# Patient Record
Sex: FEMALE | Race: WHITE | Employment: OTHER | URBAN - METROPOLITAN AREA
[De-identification: names, ages, dates, MRNs, and addresses within clinical notes are randomized per-mention and may not be internally consistent; named-entity substitution may affect disease eponyms.]

---

## 2018-01-01 ENCOUNTER — TELEPHONE (OUTPATIENT)
Dept: FAMILY MEDICINE CLINIC | Age: 68
End: 2018-01-01

## 2018-01-01 ENCOUNTER — OFFICE VISIT (OUTPATIENT)
Dept: FAMILY MEDICINE CLINIC | Age: 68
End: 2018-01-01
Payer: MEDICARE

## 2018-01-01 ENCOUNTER — HOSPITAL ENCOUNTER (OUTPATIENT)
Age: 68
Discharge: HOME | End: 2018-08-16
Payer: MEDICARE

## 2018-01-01 VITALS
HEIGHT: 60 IN | TEMPERATURE: 97.9 F | WEIGHT: 121 LBS | RESPIRATION RATE: 16 BRPM | SYSTOLIC BLOOD PRESSURE: 128 MMHG | DIASTOLIC BLOOD PRESSURE: 88 MMHG | HEART RATE: 86 BPM | BODY MASS INDEX: 23.75 KG/M2 | OXYGEN SATURATION: 93 %

## 2018-01-01 VITALS
BODY MASS INDEX: 24.15 KG/M2 | TEMPERATURE: 97.8 F | OXYGEN SATURATION: 96 % | SYSTOLIC BLOOD PRESSURE: 120 MMHG | RESPIRATION RATE: 16 BRPM | HEART RATE: 75 BPM | DIASTOLIC BLOOD PRESSURE: 78 MMHG | HEIGHT: 60 IN | WEIGHT: 123 LBS

## 2018-01-01 DIAGNOSIS — G44.229 CHRONIC TENSION-TYPE HEADACHE, NOT INTRACTABLE: ICD-10-CM

## 2018-01-01 DIAGNOSIS — I10 ESSENTIAL HYPERTENSION: ICD-10-CM

## 2018-01-01 DIAGNOSIS — G44.229 CHRONIC TENSION-TYPE HEADACHE, NOT INTRACTABLE: Primary | ICD-10-CM

## 2018-01-01 DIAGNOSIS — Z51.81 THERAPEUTIC DRUG MONITORING: Primary | ICD-10-CM

## 2018-01-01 LAB
6AM URINE: <10 NG/ML
AMOBARBITAL URINE QUANT: <50 NG/ML
AMPHETAMINE SCREEN, URINE: NOT DETECTED
BARBITURATE SCREEN URINE: POSITIVE
BENZODIAZEPINE SCREEN, URINE: NOT DETECTED
BUTALBITAL URINE QUANT: 366 NG/ML
CANNABINOID SCREEN URINE: NOT DETECTED
COCAINE METABOLITE SCREEN URINE: NOT DETECTED
CODEINE, URINE: 46 NG/ML
HYDROCODONE, URINE: 37 NG/ML
HYDROMORPHONE, URINE: <20 NG/ML
METHADONE SCREEN, URINE: NOT DETECTED
MORPHINE URINE: <20 NG/ML
NORHYDROCODONE, URINE: 94 NG/ML
NOROXYCODONE, URINE: <20 NG/ML
NOROXYMORPHONE, URINE: <20 NG/ML
OPIATE SCREEN URINE: POSITIVE
OXYCODONE, URINE CONFIRMATION: <20 NG/ML
OXYMORPHONE, URINE: <20 NG/ML
PENTOBARBITAL URINE QUANT: <50 NG/ML
PHENCYCLIDINE SCREEN URINE: NOT DETECTED
PHENOBARBITAL URINE QUANT: <50 NG/ML
PROPOXYPHENE SCREEN: NOT DETECTED
SECOBARBITAL URINE QUANT: <50 NG/ML

## 2018-01-01 PROCEDURE — 99213 OFFICE O/P EST LOW 20 MIN: CPT | Performed by: FAMILY MEDICINE

## 2018-01-01 PROCEDURE — 80307 DRUG TEST PRSMV CHEM ANLYZR: CPT

## 2018-01-01 PROCEDURE — G0480 DRUG TEST DEF 1-7 CLASSES: HCPCS

## 2018-01-01 RX ORDER — BUTALBITAL, ASPIRIN, CAFFEINE, AND CODEINE PHOSPHATE 30; 50; 40; 325 MG/1; MG/1; MG/1; MG/1
1 CAPSULE ORAL EVERY 4 HOURS PRN
Qty: 120 CAPSULE | Refills: 0 | Status: CANCELLED | OUTPATIENT
Start: 2018-01-01 | End: 2019-01-01

## 2018-01-01 RX ORDER — BUTALBITAL, ASPIRIN, CAFFEINE, AND CODEINE PHOSPHATE 30; 50; 40; 325 MG/1; MG/1; MG/1; MG/1
1 CAPSULE ORAL 4 TIMES DAILY PRN
Qty: 120 CAPSULE | Refills: 0 | Status: SHIPPED | OUTPATIENT
Start: 2018-01-01 | End: 2018-01-01 | Stop reason: SDUPTHER

## 2018-01-01 RX ORDER — CODEINE/BUTALBITAL/ASA/CAFFEIN 30-50-325
1 CAPSULE ORAL EVERY 8 HOURS PRN
Qty: 90 CAPSULE | Refills: 0 | Status: SHIPPED | OUTPATIENT
Start: 2018-01-01 | End: 2019-01-01 | Stop reason: SDUPTHER

## 2018-01-01 RX ORDER — CODEINE/BUTALBITAL/ASA/CAFFEIN 30-50-325
1 CAPSULE ORAL EVERY 4 HOURS PRN
COMMUNITY
End: 2018-01-01 | Stop reason: SDUPTHER

## 2018-01-01 RX ORDER — ESTROGENS, CONJUGATED 1.25 MG
1.25 TABLET ORAL DAILY
Qty: 90 TABLET | Refills: 1 | Status: SHIPPED | OUTPATIENT
Start: 2018-01-01 | End: 2018-01-01

## 2018-01-01 RX ORDER — AMLODIPINE BESYLATE AND BENAZEPRIL HYDROCHLORIDE 5; 20 MG/1; MG/1
1 CAPSULE ORAL DAILY
Qty: 30 CAPSULE | Refills: 3 | Status: ON HOLD | OUTPATIENT
Start: 2018-01-01 | End: 2019-01-01 | Stop reason: HOSPADM

## 2018-01-01 RX ORDER — BUTALBITAL, ASPIRIN, CAFFEINE, AND CODEINE PHOSPHATE 30; 50; 40; 325 MG/1; MG/1; MG/1; MG/1
1 CAPSULE ORAL 4 TIMES DAILY PRN
Qty: 120 CAPSULE | Refills: 0 | Status: SHIPPED | OUTPATIENT
Start: 2018-01-01 | End: 2018-01-01

## 2018-01-01 ASSESSMENT — PATIENT HEALTH QUESTIONNAIRE - PHQ9
2. FEELING DOWN, DEPRESSED OR HOPELESS: 0
1. LITTLE INTEREST OR PLEASURE IN DOING THINGS: 0
SUM OF ALL RESPONSES TO PHQ QUESTIONS 1-9: 0
SUM OF ALL RESPONSES TO PHQ9 QUESTIONS 1 & 2: 0
SUM OF ALL RESPONSES TO PHQ QUESTIONS 1-9: 0

## 2018-01-01 ASSESSMENT — ENCOUNTER SYMPTOMS
HEMOPTYSIS: 0
NAUSEA: 0
SHORTNESS OF BREATH: 0
HEARTBURN: 0
COUGH: 0
BLURRED VISION: 0
COUGH: 1
WHEEZING: 0

## 2018-08-14 NOTE — PROGRESS NOTES
HPI:  Patient comes in today for   Chief Complaint   Patient presents with    Headache     refill on fiorinal last dose three days ago. .          Review of Systems  Review of Systems   Constitutional: Negative for chills, fever and weight loss. HENT: Negative for hearing loss and tinnitus. Eyes: Negative for blurred vision. Respiratory: Negative for cough and hemoptysis. Cardiovascular: Negative for chest pain and palpitations. Gastrointestinal: Negative for heartburn and nausea. Skin: Negative for rash. Neurological: Positive for headaches (Severe are cluster types). Negative for tingling and tremors. Psychiatric/Behavioral: Negative for depression. PE:  VS:  /88   Pulse 86   Temp 97.9 °F (36.6 °C) (Oral)   Resp 16   Ht 5' (1.524 m)   Wt 121 lb (54.9 kg)   SpO2 93%   BMI 23.63 kg/m²   Physical Exam   Constitutional: She appears well-developed. HENT:   Head: Normocephalic. Eyes: Pupils are equal, round, and reactive to light. Neck: Normal range of motion. No tracheal deviation present. No thyromegaly present. Cardiovascular: Normal rate. Pulmonary/Chest: Effort normal.   Abdominal: Soft. She exhibits no distension. There is no tenderness. Musculoskeletal: Normal range of motion. Neurological: She is alert. Skin: Skin is warm. No rash noted. No erythema. Assessment/Plan:  Rosa Jordan was seen today for headache. Diagnoses and all orders for this visit:    Therapeutic drug monitoring  -     Urine Drug Screen; Future    Chronic tension-type headache, not intractable  -     FIORINAL/CODEINE #3 -95-30 MG capsule; Take 1 capsule by mouth 4 times daily as needed for Migraine for up to 30 days. JOSEPH Ritchie.

## 2019-01-01 ENCOUNTER — HOSPITAL ENCOUNTER (INPATIENT)
Age: 69
LOS: 13 days | Discharge: HOME HEALTH | DRG: 870 | End: 2019-02-19
Attending: EMERGENCY MEDICINE | Admitting: INTERNAL MEDICINE
Payer: MEDICARE

## 2019-01-01 ENCOUNTER — OFFICE VISIT (OUTPATIENT)
Dept: FAMILY MEDICINE CLINIC | Age: 69
End: 2019-01-01
Payer: MEDICARE

## 2019-01-01 ENCOUNTER — CARE COORDINATION (OUTPATIENT)
Dept: CARE COORDINATION | Age: 69
End: 2019-01-01

## 2019-01-01 ENCOUNTER — TELEPHONE (OUTPATIENT)
Dept: FAMILY MEDICINE CLINIC | Age: 69
End: 2019-01-01

## 2019-01-01 ENCOUNTER — APPOINTMENT (OUTPATIENT)
Dept: GENERAL RADIOLOGY | Age: 69
DRG: 870 | End: 2019-01-01
Payer: MEDICARE

## 2019-01-01 VITALS
TEMPERATURE: 98.3 F | BODY MASS INDEX: 24.74 KG/M2 | OXYGEN SATURATION: 97 % | RESPIRATION RATE: 16 BRPM | HEIGHT: 60 IN | WEIGHT: 126 LBS | DIASTOLIC BLOOD PRESSURE: 80 MMHG | SYSTOLIC BLOOD PRESSURE: 136 MMHG | HEART RATE: 101 BPM

## 2019-01-01 VITALS
DIASTOLIC BLOOD PRESSURE: 87 MMHG | RESPIRATION RATE: 18 BRPM | TEMPERATURE: 98 F | HEIGHT: 60 IN | OXYGEN SATURATION: 94 % | BODY MASS INDEX: 26.01 KG/M2 | HEART RATE: 80 BPM | SYSTOLIC BLOOD PRESSURE: 139 MMHG | WEIGHT: 132.5 LBS

## 2019-01-01 DIAGNOSIS — J44.1 ACUTE EXACERBATION OF CHRONIC OBSTRUCTIVE PULMONARY DISEASE (COPD) (HCC): ICD-10-CM

## 2019-01-01 DIAGNOSIS — G44.229 CHRONIC TENSION-TYPE HEADACHE, NOT INTRACTABLE: ICD-10-CM

## 2019-01-01 DIAGNOSIS — J96.02 ACUTE HYPERCAPNIC RESPIRATORY FAILURE (HCC): Primary | ICD-10-CM

## 2019-01-01 DIAGNOSIS — J18.9 PNEUMONIA DUE TO ORGANISM: ICD-10-CM

## 2019-01-01 DIAGNOSIS — J44.1 COPD EXACERBATION (HCC): ICD-10-CM

## 2019-01-01 DIAGNOSIS — J96.02 ACUTE HYPERCAPNIC RESPIRATORY FAILURE (HCC): Primary | Chronic | ICD-10-CM

## 2019-01-01 DIAGNOSIS — A41.9 SEPSIS, DUE TO UNSPECIFIED ORGANISM: ICD-10-CM

## 2019-01-01 DIAGNOSIS — E87.20 LACTIC ACIDOSIS: ICD-10-CM

## 2019-01-01 DIAGNOSIS — E87.6 HYPOKALEMIA: ICD-10-CM

## 2019-01-01 LAB
AADO2: 110.1 MMHG
AADO2: 111.8 MMHG
AADO2: 121.2 MMHG
AADO2: 124.3 MMHG
AADO2: 132 MMHG
AADO2: 140.2 MMHG
AADO2: 144.5 MMHG
AADO2: 146.3 MMHG
AADO2: 159.7 MMHG
AADO2: 175.8 MMHG
AADO2: 175.8 MMHG
AADO2: 177.1 MMHG
AADO2: 184.6 MMHG
ALBUMIN SERPL-MCNC: 2.2 G/DL (ref 3.5–5.2)
ALBUMIN SERPL-MCNC: 2.4 G/DL (ref 3.5–5.2)
ALBUMIN SERPL-MCNC: 2.5 G/DL (ref 3.5–5.2)
ALBUMIN SERPL-MCNC: 2.5 G/DL (ref 3.5–5.2)
ALBUMIN SERPL-MCNC: 2.6 G/DL (ref 3.5–5.2)
ALBUMIN SERPL-MCNC: 3 G/DL (ref 3.5–5.2)
ALBUMIN SERPL-MCNC: 3.4 G/DL (ref 3.5–5.2)
ALP BLD-CCNC: 128 U/L (ref 35–104)
ALP BLD-CCNC: 145 U/L (ref 35–104)
ALP BLD-CCNC: 61 U/L (ref 35–104)
ALP BLD-CCNC: 64 U/L (ref 35–104)
ALP BLD-CCNC: 65 U/L (ref 35–104)
ALP BLD-CCNC: 75 U/L (ref 35–104)
ALP BLD-CCNC: 80 U/L (ref 35–104)
ALP BLD-CCNC: 85 U/L (ref 35–104)
ALP BLD-CCNC: 88 U/L (ref 35–104)
ALT SERPL-CCNC: 11 U/L (ref 0–32)
ALT SERPL-CCNC: 11 U/L (ref 0–32)
ALT SERPL-CCNC: 12 U/L (ref 0–32)
ALT SERPL-CCNC: 12 U/L (ref 0–32)
ALT SERPL-CCNC: 13 U/L (ref 0–32)
ALT SERPL-CCNC: 14 U/L (ref 0–32)
ALT SERPL-CCNC: 8 U/L (ref 0–32)
ALT SERPL-CCNC: 8 U/L (ref 0–32)
ALT SERPL-CCNC: 9 U/L (ref 0–32)
ANION GAP SERPL CALCULATED.3IONS-SCNC: 10 MMOL/L (ref 7–16)
ANION GAP SERPL CALCULATED.3IONS-SCNC: 14 MMOL/L (ref 7–16)
ANION GAP SERPL CALCULATED.3IONS-SCNC: 22 MMOL/L (ref 7–16)
ANION GAP SERPL CALCULATED.3IONS-SCNC: 5 MMOL/L (ref 7–16)
ANION GAP SERPL CALCULATED.3IONS-SCNC: 5 MMOL/L (ref 7–16)
ANION GAP SERPL CALCULATED.3IONS-SCNC: 6 MMOL/L (ref 7–16)
ANION GAP SERPL CALCULATED.3IONS-SCNC: 7 MMOL/L (ref 7–16)
ANION GAP SERPL CALCULATED.3IONS-SCNC: 8 MMOL/L (ref 7–16)
ANION GAP SERPL CALCULATED.3IONS-SCNC: 8 MMOL/L (ref 7–16)
ANION GAP SERPL CALCULATED.3IONS-SCNC: 9 MMOL/L (ref 7–16)
ANISOCYTOSIS: ABNORMAL
APTT: 31.7 SEC (ref 24.5–35.1)
AST SERPL-CCNC: 15 U/L (ref 0–31)
AST SERPL-CCNC: 15 U/L (ref 0–31)
AST SERPL-CCNC: 16 U/L (ref 0–31)
AST SERPL-CCNC: 18 U/L (ref 0–31)
AST SERPL-CCNC: 19 U/L (ref 0–31)
AST SERPL-CCNC: 20 U/L (ref 0–31)
AST SERPL-CCNC: 23 U/L (ref 0–31)
B.E.: -0.2 MMOL/L (ref -3–3)
B.E.: -10.7 MMOL/L (ref -3–3)
B.E.: -11.9 MMOL/L (ref -3–3)
B.E.: -2.3 MMOL/L (ref -3–3)
B.E.: -4.3 MMOL/L (ref -3–3)
B.E.: -7.3 MMOL/L (ref -3–3)
B.E.: -7.6 MMOL/L (ref -3–3)
B.E.: -9.2 MMOL/L (ref -3–3)
B.E.: -9.6 MMOL/L (ref -3–3)
B.E.: 0 MMOL/L (ref -3–3)
B.E.: 3.1 MMOL/L (ref -3–3)
B.E.: 4.4 MMOL/L (ref -3–0)
B.E.: 6.4 MMOL/L (ref -3–3)
B.E.: 7 MMOL/L (ref -3–3)
B.E.: 8.4 MMOL/L (ref -3–3)
BACTERIA: ABNORMAL /HPF
BASOPHILS ABSOLUTE: 0 E9/L (ref 0–0.2)
BASOPHILS ABSOLUTE: 0.01 E9/L (ref 0–0.2)
BASOPHILS ABSOLUTE: 0.02 E9/L (ref 0–0.2)
BASOPHILS ABSOLUTE: 0.03 E9/L (ref 0–0.2)
BASOPHILS ABSOLUTE: 0.04 E9/L (ref 0–0.2)
BASOPHILS ABSOLUTE: 0.06 E9/L (ref 0–0.2)
BASOPHILS RELATIVE PERCENT: 0 % (ref 0–2)
BASOPHILS RELATIVE PERCENT: 0.1 % (ref 0–2)
BASOPHILS RELATIVE PERCENT: 0.2 % (ref 0–2)
BASOPHILS RELATIVE PERCENT: 0.3 % (ref 0–2)
BILIRUB SERPL-MCNC: 0.2 MG/DL (ref 0–1.2)
BILIRUB SERPL-MCNC: 0.2 MG/DL (ref 0–1.2)
BILIRUB SERPL-MCNC: 0.3 MG/DL (ref 0–1.2)
BILIRUB SERPL-MCNC: <0.2 MG/DL (ref 0–1.2)
BILIRUBIN URINE: NEGATIVE
BLOOD CULTURE, ROUTINE: NORMAL
BLOOD, URINE: ABNORMAL
BUN BLDV-MCNC: 25 MG/DL (ref 8–23)
BUN BLDV-MCNC: 27 MG/DL (ref 8–23)
BUN BLDV-MCNC: 29 MG/DL (ref 8–23)
BUN BLDV-MCNC: 31 MG/DL (ref 8–23)
BUN BLDV-MCNC: 33 MG/DL (ref 8–23)
BUN BLDV-MCNC: 34 MG/DL (ref 8–23)
BUN BLDV-MCNC: 35 MG/DL (ref 8–23)
BUN BLDV-MCNC: 39 MG/DL (ref 8–23)
BUN BLDV-MCNC: 41 MG/DL (ref 8–23)
BUN BLDV-MCNC: 41 MG/DL (ref 8–23)
BUN BLDV-MCNC: 43 MG/DL (ref 8–23)
BURR CELLS: ABNORMAL
BURR CELLS: ABNORMAL
CALCIUM IONIZED: 1.18 MMOL/L (ref 1.15–1.33)
CALCIUM SERPL-MCNC: 7.5 MG/DL (ref 8.6–10.2)
CALCIUM SERPL-MCNC: 7.7 MG/DL (ref 8.6–10.2)
CALCIUM SERPL-MCNC: 7.9 MG/DL (ref 8.6–10.2)
CALCIUM SERPL-MCNC: 8 MG/DL (ref 8.6–10.2)
CALCIUM SERPL-MCNC: 8.1 MG/DL (ref 8.6–10.2)
CALCIUM SERPL-MCNC: 8.2 MG/DL (ref 8.6–10.2)
CALCIUM SERPL-MCNC: 8.4 MG/DL (ref 8.6–10.2)
CALCIUM SERPL-MCNC: 8.5 MG/DL (ref 8.6–10.2)
CALCIUM SERPL-MCNC: 8.7 MG/DL (ref 8.6–10.2)
CALCIUM SERPL-MCNC: 8.8 MG/DL (ref 8.6–10.2)
CALCIUM SERPL-MCNC: 9.2 MG/DL (ref 8.6–10.2)
CASTS: ABNORMAL /LPF
CHLORIDE BLD-SCNC: 100 MMOL/L (ref 98–107)
CHLORIDE BLD-SCNC: 102 MMOL/L (ref 98–107)
CHLORIDE BLD-SCNC: 102 MMOL/L (ref 98–107)
CHLORIDE BLD-SCNC: 105 MMOL/L (ref 98–107)
CHLORIDE BLD-SCNC: 105 MMOL/L (ref 98–107)
CHLORIDE BLD-SCNC: 108 MMOL/L (ref 98–107)
CHLORIDE BLD-SCNC: 109 MMOL/L (ref 98–107)
CHLORIDE BLD-SCNC: 110 MMOL/L (ref 98–107)
CHLORIDE BLD-SCNC: 111 MMOL/L (ref 98–107)
CHLORIDE BLD-SCNC: 98 MMOL/L (ref 98–107)
CLARITY: CLEAR
CO2: 19 MMOL/L (ref 22–29)
CO2: 21 MMOL/L (ref 22–29)
CO2: 22 MMOL/L (ref 22–29)
CO2: 24 MMOL/L (ref 22–29)
CO2: 29 MMOL/L (ref 22–29)
CO2: 31 MMOL/L (ref 22–29)
CO2: 34 MMOL/L (ref 22–29)
CO2: 36 MMOL/L (ref 22–29)
CO2: 36 MMOL/L (ref 22–29)
COHB: 0.8 % (ref 0–1.5)
COHB: 1 % (ref 0–1.5)
COHB: 1.1 % (ref 0–1.5)
COHB: 1.2 % (ref 0–1.5)
COHB: 1.3 % (ref 0–1.5)
COHB: 1.7 % (ref 0–1.5)
COHB: 2 % (ref 0–1.5)
COHB: 2.1 % (ref 0–1.5)
COHB: 2.9 % (ref 0–1.5)
COHB: 5.1 % (ref 0–1.5)
COLOR: YELLOW
COMMENT: ABNORMAL
CREAT SERPL-MCNC: 0.4 MG/DL (ref 0.5–1)
CREAT SERPL-MCNC: 0.5 MG/DL (ref 0.5–1)
CREAT SERPL-MCNC: 0.6 MG/DL (ref 0.5–1)
CREAT SERPL-MCNC: 0.7 MG/DL (ref 0.5–1)
CREAT SERPL-MCNC: 0.7 MG/DL (ref 0.5–1)
CREAT SERPL-MCNC: 0.8 MG/DL (ref 0.5–1)
CREAT SERPL-MCNC: 0.9 MG/DL (ref 0.5–1)
CRITICAL: ABNORMAL
CULTURE, BLOOD 2: ABNORMAL
CULTURE, BLOOD 2: ABNORMAL
CULTURE, RESPIRATORY: ABNORMAL
CULTURE, RESPIRATORY: ABNORMAL
DATE ANALYZED: ABNORMAL
DATE OF COLLECTION: ABNORMAL
DEVICE: ABNORMAL
DOHLE BODIES: ABNORMAL
EKG ATRIAL RATE: 111 BPM
EKG ATRIAL RATE: 149 BPM
EKG P AXIS: 71 DEGREES
EKG P AXIS: 74 DEGREES
EKG P-R INTERVAL: 138 MS
EKG P-R INTERVAL: 138 MS
EKG Q-T INTERVAL: 238 MS
EKG Q-T INTERVAL: 316 MS
EKG QRS DURATION: 60 MS
EKG QRS DURATION: 74 MS
EKG QTC CALCULATION (BAZETT): 374 MS
EKG QTC CALCULATION (BAZETT): 429 MS
EKG R AXIS: -11 DEGREES
EKG R AXIS: -155 DEGREES
EKG T AXIS: 71 DEGREES
EKG T AXIS: 75 DEGREES
EKG VENTRICULAR RATE: 111 BPM
EKG VENTRICULAR RATE: 149 BPM
EOSINOPHILS ABSOLUTE: 0 E9/L (ref 0.05–0.5)
EOSINOPHILS ABSOLUTE: 0.02 E9/L (ref 0.05–0.5)
EOSINOPHILS ABSOLUTE: 0.04 E9/L (ref 0.05–0.5)
EOSINOPHILS ABSOLUTE: 0.07 E9/L (ref 0.05–0.5)
EOSINOPHILS RELATIVE PERCENT: 0 % (ref 0–6)
EOSINOPHILS RELATIVE PERCENT: 0.2 % (ref 0–6)
EOSINOPHILS RELATIVE PERCENT: 0.3 % (ref 0–6)
EOSINOPHILS RELATIVE PERCENT: 0.5 % (ref 0–6)
EPITHELIAL CELLS, UA: ABNORMAL /HPF
FIO2 ARTERIAL: 35
FIO2: 35 %
FIO2: 40 %
FIO2: 45 %
FIO2: 60 %
FOLATE: 9.5 NG/ML (ref 4.8–24.2)
GFR AFRICAN AMERICAN: >60
GFR NON-AFRICAN AMERICAN: >60 ML/MIN/1.73
GLUCOSE BLD-MCNC: 112 MG/DL (ref 74–99)
GLUCOSE BLD-MCNC: 118 MG/DL (ref 74–99)
GLUCOSE BLD-MCNC: 132 MG/DL (ref 74–99)
GLUCOSE BLD-MCNC: 146 MG/DL (ref 74–99)
GLUCOSE BLD-MCNC: 146 MG/DL (ref 74–99)
GLUCOSE BLD-MCNC: 154 MG/DL (ref 74–99)
GLUCOSE BLD-MCNC: 156 MG/DL (ref 74–99)
GLUCOSE BLD-MCNC: 162 MG/DL (ref 74–99)
GLUCOSE BLD-MCNC: 162 MG/DL (ref 74–99)
GLUCOSE BLD-MCNC: 181 MG/DL (ref 74–99)
GLUCOSE BLD-MCNC: 211 MG/DL (ref 74–99)
GLUCOSE BLD-MCNC: 220 MG/DL (ref 74–99)
GLUCOSE BLD-MCNC: 94 MG/DL (ref 74–99)
GLUCOSE URINE: NEGATIVE MG/DL
HCO3 ARTERIAL: 30.1 MMOL/L (ref 22–26)
HCO3: 18.2 MMOL/L (ref 22–26)
HCO3: 19.4 MMOL/L (ref 22–26)
HCO3: 19.6 MMOL/L (ref 22–26)
HCO3: 19.8 MMOL/L (ref 22–26)
HCO3: 20 MMOL/L (ref 22–26)
HCO3: 20.5 MMOL/L (ref 22–26)
HCO3: 21.3 MMOL/L (ref 22–26)
HCO3: 25.5 MMOL/L (ref 22–26)
HCO3: 26.6 MMOL/L (ref 22–26)
HCO3: 26.9 MMOL/L (ref 22–26)
HCO3: 29.3 MMOL/L (ref 22–26)
HCO3: 31.4 MMOL/L (ref 22–26)
HCO3: 32.2 MMOL/L (ref 22–26)
HCO3: 34.1 MMOL/L (ref 22–26)
HCT VFR BLD CALC: 34.7 % (ref 34–48)
HCT VFR BLD CALC: 34.9 % (ref 34–48)
HCT VFR BLD CALC: 35.2 % (ref 34–48)
HCT VFR BLD CALC: 35.3 % (ref 34–48)
HCT VFR BLD CALC: 35.6 % (ref 34–48)
HCT VFR BLD CALC: 36.1 % (ref 34–48)
HCT VFR BLD CALC: 36.2 % (ref 34–48)
HCT VFR BLD CALC: 36.7 % (ref 34–48)
HCT VFR BLD CALC: 37.8 % (ref 34–48)
HCT VFR BLD CALC: 38 % (ref 34–48)
HCT VFR BLD CALC: 38.2 % (ref 34–48)
HCT VFR BLD CALC: 38.3 % (ref 34–48)
HCT VFR BLD CALC: 50.6 % (ref 34–48)
HEMOGLOBIN: 10.7 G/DL (ref 11.5–15.5)
HEMOGLOBIN: 11 G/DL (ref 11.5–15.5)
HEMOGLOBIN: 11 G/DL (ref 11.5–15.5)
HEMOGLOBIN: 11.2 G/DL (ref 11.5–15.5)
HEMOGLOBIN: 11.2 G/DL (ref 11.5–15.5)
HEMOGLOBIN: 11.3 G/DL (ref 11.5–15.5)
HEMOGLOBIN: 11.3 G/DL (ref 11.5–15.5)
HEMOGLOBIN: 11.5 G/DL (ref 11.5–15.5)
HEMOGLOBIN: 11.6 G/DL (ref 11.5–15.5)
HEMOGLOBIN: 11.6 G/DL (ref 11.5–15.5)
HEMOGLOBIN: 15.2 G/DL (ref 11.5–15.5)
HHB: 1.7 % (ref 0–5)
HHB: 10.5 % (ref 0–5)
HHB: 13.5 % (ref 0–5)
HHB: 2.5 % (ref 0–5)
HHB: 4.1 % (ref 0–5)
HHB: 4.2 % (ref 0–5)
HHB: 5 % (ref 0–5)
HHB: 5.4 % (ref 0–5)
HHB: 6.3 % (ref 0–5)
HHB: 6.8 % (ref 0–5)
HHB: 8.1 % (ref 0–5)
HHB: 8.5 % (ref 0–5)
HHB: 8.9 % (ref 0–5)
HHB: 9.7 % (ref 0–5)
HYPERSEGMENTED NEUTROPHILS: ABNORMAL
HYPOCHROMIA: ABNORMAL
IMMATURE GRANULOCYTES #: 0.11 E9/L
IMMATURE GRANULOCYTES #: 0.11 E9/L
IMMATURE GRANULOCYTES #: 0.12 E9/L
IMMATURE GRANULOCYTES #: 0.13 E9/L
IMMATURE GRANULOCYTES #: 0.18 E9/L
IMMATURE GRANULOCYTES #: 0.18 E9/L
IMMATURE GRANULOCYTES #: 0.22 E9/L
IMMATURE GRANULOCYTES #: 0.26 E9/L
IMMATURE GRANULOCYTES #: 0.28 E9/L
IMMATURE GRANULOCYTES #: 0.8 E9/L
IMMATURE GRANULOCYTES %: 0.8 % (ref 0–5)
IMMATURE GRANULOCYTES %: 0.9 % (ref 0–5)
IMMATURE GRANULOCYTES %: 0.9 % (ref 0–5)
IMMATURE GRANULOCYTES %: 1.1 % (ref 0–5)
IMMATURE GRANULOCYTES %: 1.3 % (ref 0–5)
IMMATURE GRANULOCYTES %: 1.3 % (ref 0–5)
IMMATURE GRANULOCYTES %: 1.5 % (ref 0–5)
IMMATURE GRANULOCYTES %: 2.1 % (ref 0–5)
IMMATURE GRANULOCYTES %: 2.1 % (ref 0–5)
IMMATURE GRANULOCYTES %: 4.2 % (ref 0–5)
INFLUENZA A BY PCR: NOT DETECTED
INFLUENZA B BY PCR: NOT DETECTED
INR BLD: 1.3
KETONES, URINE: NEGATIVE MG/DL
L. PNEUMOPHILA SEROGP 1 UR AG: NORMAL
LAB: ABNORMAL
LACTIC ACID: 1.2 MMOL/L (ref 0.5–2.2)
LACTIC ACID: 1.4 MMOL/L (ref 0.5–2.2)
LACTIC ACID: 1.5 MMOL/L (ref 0.5–2.2)
LACTIC ACID: 1.7 MMOL/L (ref 0.5–2.2)
LACTIC ACID: 6.7 MMOL/L (ref 0.5–2.2)
LEUKOCYTE ESTERASE, URINE: NEGATIVE
LV EF: 60 %
LVEF MODALITY: NORMAL
LYMPHOCYTES ABSOLUTE: 0.26 E9/L (ref 1.5–4)
LYMPHOCYTES ABSOLUTE: 0.31 E9/L (ref 1.5–4)
LYMPHOCYTES ABSOLUTE: 0.38 E9/L (ref 1.5–4)
LYMPHOCYTES ABSOLUTE: 0.45 E9/L (ref 1.5–4)
LYMPHOCYTES ABSOLUTE: 0.53 E9/L (ref 1.5–4)
LYMPHOCYTES ABSOLUTE: 0.71 E9/L (ref 1.5–4)
LYMPHOCYTES ABSOLUTE: 0.78 E9/L (ref 1.5–4)
LYMPHOCYTES ABSOLUTE: 0.89 E9/L (ref 1.5–4)
LYMPHOCYTES ABSOLUTE: 1.21 E9/L (ref 1.5–4)
LYMPHOCYTES ABSOLUTE: 1.24 E9/L (ref 1.5–4)
LYMPHOCYTES ABSOLUTE: 1.4 E9/L (ref 1.5–4)
LYMPHOCYTES ABSOLUTE: 1.91 E9/L (ref 1.5–4)
LYMPHOCYTES ABSOLUTE: 2.55 E9/L (ref 1.5–4)
LYMPHOCYTES RELATIVE PERCENT: 11 % (ref 20–42)
LYMPHOCYTES RELATIVE PERCENT: 11.3 % (ref 20–42)
LYMPHOCYTES RELATIVE PERCENT: 12 % (ref 20–42)
LYMPHOCYTES RELATIVE PERCENT: 2 % (ref 20–42)
LYMPHOCYTES RELATIVE PERCENT: 2.1 % (ref 20–42)
LYMPHOCYTES RELATIVE PERCENT: 2.3 % (ref 20–42)
LYMPHOCYTES RELATIVE PERCENT: 3.9 % (ref 20–42)
LYMPHOCYTES RELATIVE PERCENT: 4.1 % (ref 20–42)
LYMPHOCYTES RELATIVE PERCENT: 4.3 % (ref 20–42)
LYMPHOCYTES RELATIVE PERCENT: 5.3 % (ref 20–42)
LYMPHOCYTES RELATIVE PERCENT: 5.7 % (ref 20–42)
LYMPHOCYTES RELATIVE PERCENT: 8 % (ref 20–42)
LYMPHOCYTES RELATIVE PERCENT: 9.1 % (ref 20–42)
Lab: ABNORMAL
MAGNESIUM: 2 MG/DL (ref 1.6–2.6)
MAGNESIUM: 2.1 MG/DL (ref 1.6–2.6)
MAGNESIUM: 2.2 MG/DL (ref 1.6–2.6)
MAGNESIUM: 2.3 MG/DL (ref 1.6–2.6)
MAGNESIUM: 2.8 MG/DL (ref 1.6–2.6)
MCH RBC QN AUTO: 29.4 PG (ref 26–35)
MCH RBC QN AUTO: 30 PG (ref 26–35)
MCH RBC QN AUTO: 30.1 PG (ref 26–35)
MCH RBC QN AUTO: 30.3 PG (ref 26–35)
MCH RBC QN AUTO: 30.3 PG (ref 26–35)
MCH RBC QN AUTO: 30.4 PG (ref 26–35)
MCH RBC QN AUTO: 30.4 PG (ref 26–35)
MCH RBC QN AUTO: 30.5 PG (ref 26–35)
MCH RBC QN AUTO: 30.5 PG (ref 26–35)
MCH RBC QN AUTO: 30.7 PG (ref 26–35)
MCH RBC QN AUTO: 30.7 PG (ref 26–35)
MCH RBC QN AUTO: 30.8 PG (ref 26–35)
MCH RBC QN AUTO: 31.1 PG (ref 26–35)
MCHC RBC AUTO-ENTMCNC: 29.5 % (ref 32–34.5)
MCHC RBC AUTO-ENTMCNC: 30 % (ref 32–34.5)
MCHC RBC AUTO-ENTMCNC: 30.3 % (ref 32–34.5)
MCHC RBC AUTO-ENTMCNC: 30.4 % (ref 32–34.5)
MCHC RBC AUTO-ENTMCNC: 30.4 % (ref 32–34.5)
MCHC RBC AUTO-ENTMCNC: 30.8 % (ref 32–34.5)
MCHC RBC AUTO-ENTMCNC: 31.2 % (ref 32–34.5)
MCHC RBC AUTO-ENTMCNC: 31.3 % (ref 32–34.5)
MCHC RBC AUTO-ENTMCNC: 31.5 % (ref 32–34.5)
MCHC RBC AUTO-ENTMCNC: 31.5 % (ref 32–34.5)
MCHC RBC AUTO-ENTMCNC: 31.6 % (ref 32–34.5)
MCHC RBC AUTO-ENTMCNC: 31.7 % (ref 32–34.5)
MCHC RBC AUTO-ENTMCNC: 31.9 % (ref 32–34.5)
MCV RBC AUTO: 100.8 FL (ref 80–99.9)
MCV RBC AUTO: 101.2 FL (ref 80–99.9)
MCV RBC AUTO: 96.2 FL (ref 80–99.9)
MCV RBC AUTO: 96.3 FL (ref 80–99.9)
MCV RBC AUTO: 96.5 FL (ref 80–99.9)
MCV RBC AUTO: 96.5 FL (ref 80–99.9)
MCV RBC AUTO: 97 FL (ref 80–99.9)
MCV RBC AUTO: 98.3 FL (ref 80–99.9)
MCV RBC AUTO: 98.6 FL (ref 80–99.9)
MCV RBC AUTO: 98.7 FL (ref 80–99.9)
MCV RBC AUTO: 98.9 FL (ref 80–99.9)
MCV RBC AUTO: 99.7 FL (ref 80–99.9)
MCV RBC AUTO: 99.7 FL (ref 80–99.9)
METAMYELOCYTES RELATIVE PERCENT: 0.9 % (ref 0–1)
METER GLUCOSE: 116 MG/DL (ref 74–99)
METER GLUCOSE: 122 MG/DL (ref 74–99)
METER GLUCOSE: 123 MG/DL (ref 74–99)
METER GLUCOSE: 125 MG/DL (ref 74–99)
METER GLUCOSE: 125 MG/DL (ref 74–99)
METER GLUCOSE: 133 MG/DL (ref 74–99)
METER GLUCOSE: 135 MG/DL (ref 74–99)
METER GLUCOSE: 139 MG/DL (ref 74–99)
METER GLUCOSE: 140 MG/DL (ref 74–99)
METER GLUCOSE: 141 MG/DL (ref 74–99)
METER GLUCOSE: 143 MG/DL (ref 74–99)
METER GLUCOSE: 145 MG/DL (ref 74–99)
METER GLUCOSE: 148 MG/DL (ref 74–99)
METER GLUCOSE: 154 MG/DL (ref 74–99)
METER GLUCOSE: 157 MG/DL (ref 74–99)
METER GLUCOSE: 158 MG/DL (ref 74–99)
METER GLUCOSE: 158 MG/DL (ref 74–99)
METER GLUCOSE: 159 MG/DL (ref 74–99)
METER GLUCOSE: 164 MG/DL (ref 74–99)
METER GLUCOSE: 165 MG/DL (ref 74–99)
METER GLUCOSE: 166 MG/DL (ref 74–99)
METER GLUCOSE: 174 MG/DL (ref 74–99)
METER GLUCOSE: 174 MG/DL (ref 74–99)
METER GLUCOSE: 175 MG/DL (ref 74–99)
METER GLUCOSE: 176 MG/DL (ref 74–99)
METER GLUCOSE: 183 MG/DL (ref 74–99)
METER GLUCOSE: 189 MG/DL (ref 74–99)
METER GLUCOSE: 214 MG/DL (ref 74–99)
METER GLUCOSE: 214 MG/DL (ref 74–99)
METER GLUCOSE: 221 MG/DL (ref 74–99)
METER GLUCOSE: 225 MG/DL (ref 74–99)
METER GLUCOSE: 94 MG/DL (ref 74–99)
METHB: 0 % (ref 0–1.5)
METHB: 0.3 % (ref 0–1.5)
MODE: ABNORMAL
MODE: AC
MONOCYTES ABSOLUTE: 0.24 E9/L (ref 0.1–0.95)
MONOCYTES ABSOLUTE: 0.25 E9/L (ref 0.1–0.95)
MONOCYTES ABSOLUTE: 0.27 E9/L (ref 0.1–0.95)
MONOCYTES ABSOLUTE: 0.37 E9/L (ref 0.1–0.95)
MONOCYTES ABSOLUTE: 0.57 E9/L (ref 0.1–0.95)
MONOCYTES ABSOLUTE: 0.64 E9/L (ref 0.1–0.95)
MONOCYTES ABSOLUTE: 0.69 E9/L (ref 0.1–0.95)
MONOCYTES ABSOLUTE: 0.72 E9/L (ref 0.1–0.95)
MONOCYTES ABSOLUTE: 0.78 E9/L (ref 0.1–0.95)
MONOCYTES ABSOLUTE: 0.87 E9/L (ref 0.1–0.95)
MONOCYTES ABSOLUTE: 0.89 E9/L (ref 0.1–0.95)
MONOCYTES ABSOLUTE: 0.96 E9/L (ref 0.1–0.95)
MONOCYTES ABSOLUTE: 1.39 E9/L (ref 0.1–0.95)
MONOCYTES RELATIVE PERCENT: 1.4 % (ref 2–12)
MONOCYTES RELATIVE PERCENT: 1.8 % (ref 2–12)
MONOCYTES RELATIVE PERCENT: 2.2 % (ref 2–12)
MONOCYTES RELATIVE PERCENT: 3 % (ref 2–12)
MONOCYTES RELATIVE PERCENT: 3.6 % (ref 2–12)
MONOCYTES RELATIVE PERCENT: 4 % (ref 2–12)
MONOCYTES RELATIVE PERCENT: 4.7 % (ref 2–12)
MONOCYTES RELATIVE PERCENT: 5 % (ref 2–12)
MONOCYTES RELATIVE PERCENT: 5.7 % (ref 2–12)
MONOCYTES RELATIVE PERCENT: 5.9 % (ref 2–12)
MONOCYTES RELATIVE PERCENT: 6.1 % (ref 2–12)
MONOCYTES RELATIVE PERCENT: 6.2 % (ref 2–12)
MONOCYTES RELATIVE PERCENT: 6.8 % (ref 2–12)
NEUTROPHILS ABSOLUTE: 10.31 E9/L (ref 1.8–7.3)
NEUTROPHILS ABSOLUTE: 10.32 E9/L (ref 1.8–7.3)
NEUTROPHILS ABSOLUTE: 11 E9/L (ref 1.8–7.3)
NEUTROPHILS ABSOLUTE: 11.15 E9/L (ref 1.8–7.3)
NEUTROPHILS ABSOLUTE: 11.21 E9/L (ref 1.8–7.3)
NEUTROPHILS ABSOLUTE: 11.64 E9/L (ref 1.8–7.3)
NEUTROPHILS ABSOLUTE: 12.81 E9/L (ref 1.8–7.3)
NEUTROPHILS ABSOLUTE: 12.83 E9/L (ref 1.8–7.3)
NEUTROPHILS ABSOLUTE: 13.36 E9/L (ref 1.8–7.3)
NEUTROPHILS ABSOLUTE: 15.26 E9/L (ref 1.8–7.3)
NEUTROPHILS ABSOLUTE: 16.77 E9/L (ref 1.8–7.3)
NEUTROPHILS ABSOLUTE: 17.86 E9/L (ref 1.8–7.3)
NEUTROPHILS ABSOLUTE: 19.26 E9/L (ref 1.8–7.3)
NEUTROPHILS RELATIVE PERCENT: 81 % (ref 43–80)
NEUTROPHILS RELATIVE PERCENT: 81.7 % (ref 43–80)
NEUTROPHILS RELATIVE PERCENT: 82.5 % (ref 43–80)
NEUTROPHILS RELATIVE PERCENT: 84 % (ref 43–80)
NEUTROPHILS RELATIVE PERCENT: 84.9 % (ref 43–80)
NEUTROPHILS RELATIVE PERCENT: 87.8 % (ref 43–80)
NEUTROPHILS RELATIVE PERCENT: 88.7 % (ref 43–80)
NEUTROPHILS RELATIVE PERCENT: 89 % (ref 43–80)
NEUTROPHILS RELATIVE PERCENT: 89.9 % (ref 43–80)
NEUTROPHILS RELATIVE PERCENT: 91.8 % (ref 43–80)
NEUTROPHILS RELATIVE PERCENT: 93 % (ref 43–80)
NEUTROPHILS RELATIVE PERCENT: 94.1 % (ref 43–80)
NEUTROPHILS RELATIVE PERCENT: 94.5 % (ref 43–80)
NITRITE, URINE: NEGATIVE
NUCLEATED RED BLOOD CELLS: 0 /100 WBC
NUCLEATED RED BLOOD CELLS: 2 /100 WBC
NUCLEATED RED BLOOD CELLS: 2 /100 WBC
O2 CONTENT: 14.5 ML/DL
O2 CONTENT: 15.1 ML/DL
O2 CONTENT: 15.1 ML/DL
O2 CONTENT: 15.3 ML/DL
O2 CONTENT: 15.4 ML/DL
O2 CONTENT: 15.9 ML/DL
O2 CONTENT: 16 ML/DL
O2 CONTENT: 16.6 ML/DL
O2 CONTENT: 16.7 ML/DL
O2 CONTENT: 16.8 ML/DL
O2 CONTENT: 16.8 ML/DL
O2 CONTENT: 18.2 ML/DL
O2 SATURATION: 86.3 % (ref 92–98.5)
O2 SATURATION: 89.3 % (ref 92–98.5)
O2 SATURATION: 90.2 % (ref 92–98.5)
O2 SATURATION: 91 % (ref 92–98.5)
O2 SATURATION: 91.3 % (ref 92–98.5)
O2 SATURATION: 91.4 % (ref 92–98.5)
O2 SATURATION: 91.9 % (ref 92–98.5)
O2 SATURATION: 93 % (ref 92–98.5)
O2 SATURATION: 93.6 % (ref 92–98.5)
O2 SATURATION: 94.5 % (ref 92–98.5)
O2 SATURATION: 94.9 % (ref 92–98.5)
O2 SATURATION: 95.7 % (ref 92–98.5)
O2 SATURATION: 95.8 % (ref 92–98.5)
O2 SATURATION: 97.5 % (ref 92–98.5)
O2 SATURATION: 98.2 % (ref 92–98.5)
O2HB: 85.4 % (ref 94–97)
O2HB: 86.5 % (ref 94–97)
O2HB: 87.9 % (ref 94–97)
O2HB: 88.8 % (ref 94–97)
O2HB: 89.1 % (ref 94–97)
O2HB: 89.6 % (ref 94–97)
O2HB: 90.9 % (ref 94–97)
O2HB: 92.3 % (ref 94–97)
O2HB: 93.1 % (ref 94–97)
O2HB: 93.8 % (ref 94–97)
O2HB: 93.9 % (ref 94–97)
O2HB: 94.5 % (ref 94–97)
O2HB: 95.1 % (ref 94–97)
O2HB: 96.2 % (ref 94–97)
OPERATOR ID: 1190
OPERATOR ID: 166
OPERATOR ID: 1661
OPERATOR ID: 1661
OPERATOR ID: 3186
OPERATOR ID: 8634
OPERATOR ID: ABNORMAL
ORGANISM: ABNORMAL
ORGANISM: ABNORMAL
OVALOCYTES: ABNORMAL
OVALOCYTES: ABNORMAL
PATIENT TEMP: 37 C
PCO2 ARTERIAL: 48.3 MMHG (ref 35–45)
PCO2: 36.8 MMHG (ref 35–45)
PCO2: 41.3 MMHG (ref 35–45)
PCO2: 43.6 MMHG (ref 35–45)
PCO2: 46.7 MMHG (ref 35–45)
PCO2: 51.3 MMHG (ref 35–45)
PCO2: 51.6 MMHG (ref 35–45)
PCO2: 52.2 MMHG (ref 35–45)
PCO2: 52.3 MMHG (ref 35–45)
PCO2: 53.9 MMHG (ref 35–45)
PCO2: 55.2 MMHG (ref 35–45)
PCO2: 57.5 MMHG (ref 35–45)
PCO2: 59.6 MMHG (ref 35–45)
PCO2: 69.5 MMHG (ref 35–45)
PCO2: 79.3 MMHG (ref 35–45)
PDW BLD-RTO: 15.9 FL (ref 11.5–15)
PDW BLD-RTO: 15.9 FL (ref 11.5–15)
PDW BLD-RTO: 16 FL (ref 11.5–15)
PDW BLD-RTO: 16.1 FL (ref 11.5–15)
PDW BLD-RTO: 16.2 FL (ref 11.5–15)
PDW BLD-RTO: 16.3 FL (ref 11.5–15)
PDW BLD-RTO: 16.4 FL (ref 11.5–15)
PDW BLD-RTO: 16.4 FL (ref 11.5–15)
PDW BLD-RTO: 16.5 FL (ref 11.5–15)
PDW BLD-RTO: 16.5 FL (ref 11.5–15)
PDW BLD-RTO: 16.6 FL (ref 11.5–15)
PDW BLD-RTO: 16.8 FL (ref 11.5–15)
PDW BLD-RTO: 16.9 FL (ref 11.5–15)
PEEP/CPAP: 5 CMH2O
PFO2: 1.47 MMHG/%
PFO2: 1.49 MMHG/%
PFO2: 1.58 MMHG/%
PFO2: 1.62 MMHG/%
PFO2: 1.66 MMHG/%
PFO2: 1.68 MMHG/%
PFO2: 1.79 MMHG/%
PFO2: 1.85 MMHG/%
PFO2: 1.88 MMHG/%
PFO2: 2.02 MMHG/%
PFO2: 2.04 MMHG/%
PFO2: 2.61 MMHG/%
PFO2: 2.94 MMHG/%
PH BLOOD GAS: 7.03 (ref 7.35–7.45)
PH BLOOD GAS: 7.08 (ref 7.35–7.45)
PH BLOOD GAS: 7.14 (ref 7.35–7.45)
PH BLOOD GAS: 7.17 (ref 7.35–7.45)
PH BLOOD GAS: 7.24 (ref 7.35–7.45)
PH BLOOD GAS: 7.26 (ref 7.35–7.45)
PH BLOOD GAS: 7.31 (ref 7.35–7.45)
PH BLOOD GAS: 7.31 (ref 7.35–7.45)
PH BLOOD GAS: 7.33 (ref 7.35–7.45)
PH BLOOD GAS: 7.33 (ref 7.35–7.45)
PH BLOOD GAS: 7.37 (ref 7.35–7.45)
PH BLOOD GAS: 7.4 (ref 7.35–7.45)
PH BLOOD GAS: 7.42 (ref 7.35–7.45)
PH BLOOD GAS: 7.43 (ref 7.35–7.45)
PH BLOOD GAS: 7.47 (ref 7.35–7.45)
PH UA: 5.5 (ref 5–9)
PHOSPHORUS: 2.7 MG/DL (ref 2.5–4.5)
PHOSPHORUS: 3.1 MG/DL (ref 2.5–4.5)
PHOSPHORUS: 3.1 MG/DL (ref 2.5–4.5)
PHOSPHORUS: 3.2 MG/DL (ref 2.5–4.5)
PHOSPHORUS: 3.3 MG/DL (ref 2.5–4.5)
PHOSPHORUS: 3.3 MG/DL (ref 2.5–4.5)
PHOSPHORUS: 3.4 MG/DL (ref 2.5–4.5)
PHOSPHORUS: 3.7 MG/DL (ref 2.5–4.5)
PLATELET # BLD: 234 E9/L (ref 130–450)
PLATELET # BLD: 251 E9/L (ref 130–450)
PLATELET # BLD: 256 E9/L (ref 130–450)
PLATELET # BLD: 265 E9/L (ref 130–450)
PLATELET # BLD: 287 E9/L (ref 130–450)
PLATELET # BLD: 297 E9/L (ref 130–450)
PLATELET # BLD: 310 E9/L (ref 130–450)
PLATELET # BLD: 373 E9/L (ref 130–450)
PLATELET # BLD: 382 E9/L (ref 130–450)
PLATELET # BLD: 390 E9/L (ref 130–450)
PLATELET # BLD: 417 E9/L (ref 130–450)
PLATELET # BLD: 424 E9/L (ref 130–450)
PLATELET # BLD: 440 E9/L (ref 130–450)
PMV BLD AUTO: 10 FL (ref 7–12)
PMV BLD AUTO: 10 FL (ref 7–12)
PMV BLD AUTO: 10.2 FL (ref 7–12)
PMV BLD AUTO: 10.2 FL (ref 7–12)
PMV BLD AUTO: 10.3 FL (ref 7–12)
PMV BLD AUTO: 10.3 FL (ref 7–12)
PMV BLD AUTO: 10.4 FL (ref 7–12)
PMV BLD AUTO: 10.5 FL (ref 7–12)
PMV BLD AUTO: 10.6 FL (ref 7–12)
PMV BLD AUTO: 10.7 FL (ref 7–12)
PMV BLD AUTO: 11 FL (ref 7–12)
PO2 ARTERIAL: 64.2 MMHG (ref 60–80)
PO2: 104.4 MMHG (ref 60–100)
PO2: 176.7 MMHG (ref 60–100)
PO2: 52.3 MMHG (ref 60–100)
PO2: 58.1 MMHG (ref 60–100)
PO2: 58.8 MMHG (ref 60–100)
PO2: 64.6 MMHG (ref 60–100)
PO2: 66 MMHG (ref 60–100)
PO2: 70.7 MMHG (ref 60–100)
PO2: 71.2 MMHG (ref 60–100)
PO2: 75 MMHG (ref 60–100)
PO2: 80.3 MMHG (ref 60–100)
PO2: 80.7 MMHG (ref 60–100)
PO2: 81.4 MMHG (ref 60–100)
PO2: 83.1 MMHG (ref 60–100)
POIKILOCYTES: ABNORMAL
POLYCHROMASIA: ABNORMAL
POSITIVE END EXP PRESS: 5 CMH2O
POTASSIUM SERPL-SCNC: 3.1 MMOL/L (ref 3.5–5)
POTASSIUM SERPL-SCNC: 3.6 MMOL/L (ref 3.5–5)
POTASSIUM SERPL-SCNC: 3.7 MMOL/L (ref 3.5–5)
POTASSIUM SERPL-SCNC: 3.7 MMOL/L (ref 3.5–5)
POTASSIUM SERPL-SCNC: 3.8 MMOL/L (ref 3.5–5)
POTASSIUM SERPL-SCNC: 4.1 MMOL/L (ref 3.5–5)
POTASSIUM SERPL-SCNC: 4.1 MMOL/L (ref 3.5–5)
POTASSIUM SERPL-SCNC: 4.2 MMOL/L (ref 3.5–5)
POTASSIUM SERPL-SCNC: 4.3 MMOL/L (ref 3.5–5)
POTASSIUM SERPL-SCNC: 4.5 MMOL/L (ref 3.5–5)
POTASSIUM SERPL-SCNC: 4.8 MMOL/L (ref 3.5–5)
POTASSIUM SERPL-SCNC: 4.8 MMOL/L (ref 3.5–5)
PRESSURE SUPPORT: 8 CMH2O
PRO-BNP: 4034 PG/ML (ref 0–125)
PROCALCITONIN: 0.47 NG/ML (ref 0–0.08)
PROTEIN UA: >=300 MG/DL
PROTHROMBIN TIME: 14.5 SEC (ref 9.3–12.4)
PS: 5 CMH20
PS: 5 CMH20
RBC # BLD: 3.51 E12/L (ref 3.5–5.5)
RBC # BLD: 3.54 E12/L (ref 3.5–5.5)
RBC # BLD: 3.58 E12/L (ref 3.5–5.5)
RBC # BLD: 3.64 E12/L (ref 3.5–5.5)
RBC # BLD: 3.7 E12/L (ref 3.5–5.5)
RBC # BLD: 3.74 E12/L (ref 3.5–5.5)
RBC # BLD: 3.75 E12/L (ref 3.5–5.5)
RBC # BLD: 3.77 E12/L (ref 3.5–5.5)
RBC # BLD: 3.79 E12/L (ref 3.5–5.5)
RBC # BLD: 3.81 E12/L (ref 3.5–5.5)
RBC # BLD: 3.84 E12/L (ref 3.5–5.5)
RBC # BLD: 3.87 E12/L (ref 3.5–5.5)
RBC # BLD: 5 E12/L (ref 3.5–5.5)
RBC UA: ABNORMAL /HPF (ref 0–2)
RI(T): 107 %
RI(T): 156 %
RI(T): 180 %
RI(T): 187 %
RI(T): 206 %
RI(T): 218 %
RI(T): 222 %
RI(T): 224 %
RI(T): 227 %
RI(T): 238 %
RI(T): 249 %
RI(T): 266 %
RI(T): 90 %
RR MECHANICAL: 12 B/MIN
RR MECHANICAL: 18 B/MIN
RR MECHANICAL: 22 B/MIN
RR MECHANICAL: 28 B/MIN
RR MECHANICAL: 30 B/MIN
SMEAR, RESPIRATORY: ABNORMAL
SODIUM BLD-SCNC: 140 MMOL/L (ref 132–146)
SODIUM BLD-SCNC: 140 MMOL/L (ref 132–146)
SODIUM BLD-SCNC: 141 MMOL/L (ref 132–146)
SODIUM BLD-SCNC: 142 MMOL/L (ref 132–146)
SODIUM BLD-SCNC: 142 MMOL/L (ref 132–146)
SODIUM BLD-SCNC: 143 MMOL/L (ref 132–146)
SODIUM BLD-SCNC: 144 MMOL/L (ref 132–146)
SODIUM BLD-SCNC: 144 MMOL/L (ref 132–146)
SODIUM BLD-SCNC: 145 MMOL/L (ref 132–146)
SOURCE, BLOOD GAS: ABNORMAL
SPECIFIC GRAVITY UA: 1.02 (ref 1–1.03)
SPHEROCYTES: ABNORMAL
STREP PNEUMONIAE ANTIGEN, URINE: NORMAL
TARGET CELLS: ABNORMAL
TARGET CELLS: ABNORMAL
THB: 11.5 G/DL (ref 11.5–16.5)
THB: 12 G/DL (ref 11.5–16.5)
THB: 12.1 G/DL (ref 11.5–16.5)
THB: 12.1 G/DL (ref 11.5–16.5)
THB: 12.2 G/DL (ref 11.5–16.5)
THB: 12.3 G/DL (ref 11.5–16.5)
THB: 12.6 G/DL (ref 11.5–16.5)
THB: 12.7 G/DL (ref 11.5–16.5)
THB: 13 G/DL (ref 11.5–16.5)
THB: 14.9 G/DL (ref 11.5–16.5)
TIME ANALYZED: 1031
TIME ANALYZED: 1129
TIME ANALYZED: 1306
TIME ANALYZED: 1506
TIME ANALYZED: 346
TIME ANALYZED: 540
TIME ANALYZED: 556
TIME ANALYZED: 557
TIME ANALYZED: 559
TIME ANALYZED: 604
TIME ANALYZED: 614
TIME ANALYZED: 644
TIME ANALYZED: 701
TIME ANALYZED: 953
TOTAL PROTEIN: 4.6 G/DL (ref 6.4–8.3)
TOTAL PROTEIN: 5.1 G/DL (ref 6.4–8.3)
TOTAL PROTEIN: 5.3 G/DL (ref 6.4–8.3)
TOTAL PROTEIN: 5.5 G/DL (ref 6.4–8.3)
TOTAL PROTEIN: 5.5 G/DL (ref 6.4–8.3)
TOTAL PROTEIN: 5.8 G/DL (ref 6.4–8.3)
TOTAL PROTEIN: 6 G/DL (ref 6.4–8.3)
TOTAL PROTEIN: 6.2 G/DL (ref 6.4–8.3)
TOTAL PROTEIN: 7.9 G/DL (ref 6.4–8.3)
TOXIC GRANULATION: ABNORMAL
TROPONIN: <0.01 NG/ML (ref 0–0.03)
UROBILINOGEN, URINE: 0.2 E.U./DL
VACUOLATED NEUTROPHILS: ABNORMAL
VITAMIN B-12: 1618 PG/ML (ref 211–946)
VT MECHANICAL: 300 ML
VT MECHANICAL: 350 ML
VT MECHANICAL: 400 ML
WBC # BLD: 11.6 E9/L (ref 4.5–11.5)
WBC # BLD: 12.2 E9/L (ref 4.5–11.5)
WBC # BLD: 12.4 E9/L (ref 4.5–11.5)
WBC # BLD: 12.5 E9/L (ref 4.5–11.5)
WBC # BLD: 12.7 E9/L (ref 4.5–11.5)
WBC # BLD: 13.6 E9/L (ref 4.5–11.5)
WBC # BLD: 13.6 E9/L (ref 4.5–11.5)
WBC # BLD: 15.1 E9/L (ref 4.5–11.5)
WBC # BLD: 15.9 E9/L (ref 4.5–11.5)
WBC # BLD: 16.7 E9/L (ref 4.5–11.5)
WBC # BLD: 19.1 E9/L (ref 4.5–11.5)
WBC # BLD: 19.2 E9/L (ref 4.5–11.5)
WBC # BLD: 23.2 E9/L (ref 4.5–11.5)
WBC UA: ABNORMAL /HPF (ref 0–5)

## 2019-01-01 PROCEDURE — 6370000000 HC RX 637 (ALT 250 FOR IP): Performed by: INTERNAL MEDICINE

## 2019-01-01 PROCEDURE — 6360000002 HC RX W HCPCS: Performed by: EMERGENCY MEDICINE

## 2019-01-01 PROCEDURE — 2500000003 HC RX 250 WO HCPCS: Performed by: STUDENT IN AN ORGANIZED HEALTH CARE EDUCATION/TRAINING PROGRAM

## 2019-01-01 PROCEDURE — APPSS30 APP SPLIT SHARED TIME 16-30 MINUTES: Performed by: PHYSICIAN ASSISTANT

## 2019-01-01 PROCEDURE — 80048 BASIC METABOLIC PNL TOTAL CA: CPT

## 2019-01-01 PROCEDURE — 6360000002 HC RX W HCPCS: Performed by: STUDENT IN AN ORGANIZED HEALTH CARE EDUCATION/TRAINING PROGRAM

## 2019-01-01 PROCEDURE — 71045 X-RAY EXAM CHEST 1 VIEW: CPT

## 2019-01-01 PROCEDURE — 2580000003 HC RX 258: Performed by: INTERNAL MEDICINE

## 2019-01-01 PROCEDURE — 2700000000 HC OXYGEN THERAPY PER DAY

## 2019-01-01 PROCEDURE — 36415 COLL VENOUS BLD VENIPUNCTURE: CPT

## 2019-01-01 PROCEDURE — 84132 ASSAY OF SERUM POTASSIUM: CPT

## 2019-01-01 PROCEDURE — 6360000002 HC RX W HCPCS: Performed by: INTERNAL MEDICINE

## 2019-01-01 PROCEDURE — 82805 BLOOD GASES W/O2 SATURATION: CPT

## 2019-01-01 PROCEDURE — 87070 CULTURE OTHR SPECIMN AEROBIC: CPT

## 2019-01-01 PROCEDURE — C9113 INJ PANTOPRAZOLE SODIUM, VIA: HCPCS | Performed by: INTERNAL MEDICINE

## 2019-01-01 PROCEDURE — 93005 ELECTROCARDIOGRAM TRACING: CPT | Performed by: INTERNAL MEDICINE

## 2019-01-01 PROCEDURE — 94660 CPAP INITIATION&MGMT: CPT

## 2019-01-01 PROCEDURE — 6370000000 HC RX 637 (ALT 250 FOR IP): Performed by: STUDENT IN AN ORGANIZED HEALTH CARE EDUCATION/TRAINING PROGRAM

## 2019-01-01 PROCEDURE — 94664 DEMO&/EVAL PT USE INHALER: CPT

## 2019-01-01 PROCEDURE — 94760 N-INVAS EAR/PLS OXIMETRY 1: CPT

## 2019-01-01 PROCEDURE — 2580000003 HC RX 258: Performed by: STUDENT IN AN ORGANIZED HEALTH CARE EDUCATION/TRAINING PROGRAM

## 2019-01-01 PROCEDURE — 36600 WITHDRAWAL OF ARTERIAL BLOOD: CPT

## 2019-01-01 PROCEDURE — 80053 COMPREHEN METABOLIC PANEL: CPT

## 2019-01-01 PROCEDURE — 84100 ASSAY OF PHOSPHORUS: CPT

## 2019-01-01 PROCEDURE — 2060000000 HC ICU INTERMEDIATE R&B

## 2019-01-01 PROCEDURE — 2500000003 HC RX 250 WO HCPCS: Performed by: EMERGENCY MEDICINE

## 2019-01-01 PROCEDURE — 94640 AIRWAY INHALATION TREATMENT: CPT

## 2019-01-01 PROCEDURE — 36592 COLLECT BLOOD FROM PICC: CPT

## 2019-01-01 PROCEDURE — 83735 ASSAY OF MAGNESIUM: CPT

## 2019-01-01 PROCEDURE — 99232 SBSQ HOSP IP/OBS MODERATE 35: CPT | Performed by: NURSE PRACTITIONER

## 2019-01-01 PROCEDURE — 83605 ASSAY OF LACTIC ACID: CPT

## 2019-01-01 PROCEDURE — 97165 OT EVAL LOW COMPLEX 30 MIN: CPT

## 2019-01-01 PROCEDURE — 82962 GLUCOSE BLOOD TEST: CPT

## 2019-01-01 PROCEDURE — 87502 INFLUENZA DNA AMP PROBE: CPT

## 2019-01-01 PROCEDURE — 99999 PR OFFICE/OUTPT VISIT,PROCEDURE ONLY: CPT | Performed by: INTERNAL MEDICINE

## 2019-01-01 PROCEDURE — 99233 SBSQ HOSP IP/OBS HIGH 50: CPT | Performed by: PHYSICIAN ASSISTANT

## 2019-01-01 PROCEDURE — 96365 THER/PROPH/DIAG IV INF INIT: CPT

## 2019-01-01 PROCEDURE — 2000000000 HC ICU R&B

## 2019-01-01 PROCEDURE — 83880 ASSAY OF NATRIURETIC PEPTIDE: CPT

## 2019-01-01 PROCEDURE — 85025 COMPLETE CBC W/AUTO DIFF WBC: CPT

## 2019-01-01 PROCEDURE — 2580000003 HC RX 258: Performed by: EMERGENCY MEDICINE

## 2019-01-01 PROCEDURE — 6360000002 HC RX W HCPCS

## 2019-01-01 PROCEDURE — 81001 URINALYSIS AUTO W/SCOPE: CPT

## 2019-01-01 PROCEDURE — 97530 THERAPEUTIC ACTIVITIES: CPT

## 2019-01-01 PROCEDURE — 31500 INSERT EMERGENCY AIRWAY: CPT

## 2019-01-01 PROCEDURE — 99233 SBSQ HOSP IP/OBS HIGH 50: CPT | Performed by: INTERNAL MEDICINE

## 2019-01-01 PROCEDURE — 99495 TRANSJ CARE MGMT MOD F2F 14D: CPT | Performed by: FAMILY MEDICINE

## 2019-01-01 PROCEDURE — 99232 SBSQ HOSP IP/OBS MODERATE 35: CPT | Performed by: INTERNAL MEDICINE

## 2019-01-01 PROCEDURE — 82803 BLOOD GASES ANY COMBINATION: CPT

## 2019-01-01 PROCEDURE — 94002 VENT MGMT INPAT INIT DAY: CPT

## 2019-01-01 PROCEDURE — 02HV33Z INSERTION OF INFUSION DEVICE INTO SUPERIOR VENA CAVA, PERCUTANEOUS APPROACH: ICD-10-PCS | Performed by: INTERNAL MEDICINE

## 2019-01-01 PROCEDURE — 5A1955Z RESPIRATORY VENTILATION, GREATER THAN 96 CONSECUTIVE HOURS: ICD-10-PCS | Performed by: EMERGENCY MEDICINE

## 2019-01-01 PROCEDURE — 97161 PT EVAL LOW COMPLEX 20 MIN: CPT

## 2019-01-01 PROCEDURE — 5A09557 ASSISTANCE WITH RESPIRATORY VENTILATION, GREATER THAN 96 CONSECUTIVE HOURS, CONTINUOUS POSITIVE AIRWAY PRESSURE: ICD-10-PCS | Performed by: INTERNAL MEDICINE

## 2019-01-01 PROCEDURE — 87077 CULTURE AEROBIC IDENTIFY: CPT

## 2019-01-01 PROCEDURE — 6370000000 HC RX 637 (ALT 250 FOR IP): Performed by: EMERGENCY MEDICINE

## 2019-01-01 PROCEDURE — 87450 HC DIRECT STREP B ANTIGEN: CPT

## 2019-01-01 PROCEDURE — 6370000000 HC RX 637 (ALT 250 FOR IP): Performed by: CLINICAL NURSE SPECIALIST

## 2019-01-01 PROCEDURE — 1111F DSCHRG MED/CURRENT MED MERGE: CPT | Performed by: FAMILY MEDICINE

## 2019-01-01 PROCEDURE — 99239 HOSP IP/OBS DSCHRG MGMT >30: CPT | Performed by: INTERNAL MEDICINE

## 2019-01-01 PROCEDURE — 84484 ASSAY OF TROPONIN QUANT: CPT

## 2019-01-01 PROCEDURE — 84145 PROCALCITONIN (PCT): CPT

## 2019-01-01 PROCEDURE — 99285 EMERGENCY DEPT VISIT HI MDM: CPT

## 2019-01-01 PROCEDURE — 87184 SC STD DISK METHOD PER PLATE: CPT

## 2019-01-01 PROCEDURE — 96375 TX/PRO/DX INJ NEW DRUG ADDON: CPT

## 2019-01-01 PROCEDURE — 0BH17EZ INSERTION OF ENDOTRACHEAL AIRWAY INTO TRACHEA, VIA NATURAL OR ARTIFICIAL OPENING: ICD-10-PCS | Performed by: EMERGENCY MEDICINE

## 2019-01-01 PROCEDURE — 85610 PROTHROMBIN TIME: CPT

## 2019-01-01 PROCEDURE — 82607 VITAMIN B-12: CPT

## 2019-01-01 PROCEDURE — 87206 SMEAR FLUORESCENT/ACID STAI: CPT

## 2019-01-01 PROCEDURE — 94003 VENT MGMT INPAT SUBQ DAY: CPT

## 2019-01-01 PROCEDURE — 82330 ASSAY OF CALCIUM: CPT

## 2019-01-01 PROCEDURE — 93306 TTE W/DOPPLER COMPLETE: CPT

## 2019-01-01 PROCEDURE — 99223 1ST HOSP IP/OBS HIGH 75: CPT | Performed by: INTERNAL MEDICINE

## 2019-01-01 PROCEDURE — 82746 ASSAY OF FOLIC ACID SERUM: CPT

## 2019-01-01 PROCEDURE — 87040 BLOOD CULTURE FOR BACTERIA: CPT

## 2019-01-01 PROCEDURE — 89220 SPUTUM SPECIMEN COLLECTION: CPT

## 2019-01-01 PROCEDURE — 2500000003 HC RX 250 WO HCPCS

## 2019-01-01 PROCEDURE — 99221 1ST HOSP IP/OBS SF/LOW 40: CPT | Performed by: PHYSICIAN ASSISTANT

## 2019-01-01 PROCEDURE — 85730 THROMBOPLASTIN TIME PARTIAL: CPT

## 2019-01-01 RX ORDER — DIMETHICONE, OXYBENZONE, AND PADIMATE O 2; 2.5; 6.6 G/100G; G/100G; G/100G
STICK TOPICAL PRN
Status: DISCONTINUED | OUTPATIENT
Start: 2019-01-01 | End: 2019-01-01 | Stop reason: HOSPADM

## 2019-01-01 RX ORDER — DEXTROSE MONOHYDRATE 50 MG/ML
100 INJECTION, SOLUTION INTRAVENOUS PRN
Status: DISCONTINUED | OUTPATIENT
Start: 2019-01-01 | End: 2019-01-01

## 2019-01-01 RX ORDER — METHYLPREDNISOLONE SODIUM SUCCINATE 40 MG/ML
40 INJECTION, POWDER, LYOPHILIZED, FOR SOLUTION INTRAMUSCULAR; INTRAVENOUS EVERY 8 HOURS
Status: DISCONTINUED | OUTPATIENT
Start: 2019-01-01 | End: 2019-01-01

## 2019-01-01 RX ORDER — LABETALOL HYDROCHLORIDE 5 MG/ML
10 INJECTION, SOLUTION INTRAVENOUS ONCE
Status: DISCONTINUED | OUTPATIENT
Start: 2019-01-01 | End: 2019-01-01

## 2019-01-01 RX ORDER — PREDNISONE 10 MG/1
30 TABLET ORAL DAILY
Qty: 9 TABLET | Refills: 0 | Status: SHIPPED | OUTPATIENT
Start: 2019-01-01 | End: 2019-01-01

## 2019-01-01 RX ORDER — ACETAMINOPHEN 325 MG/1
650 TABLET ORAL EVERY 4 HOURS PRN
Status: DISCONTINUED | OUTPATIENT
Start: 2019-01-01 | End: 2019-01-01 | Stop reason: HOSPADM

## 2019-01-01 RX ORDER — PANTOPRAZOLE SODIUM 40 MG/10ML
40 INJECTION, POWDER, LYOPHILIZED, FOR SOLUTION INTRAVENOUS DAILY
Status: DISCONTINUED | OUTPATIENT
Start: 2019-01-01 | End: 2019-01-01

## 2019-01-01 RX ORDER — DEXTROSE MONOHYDRATE 25 G/50ML
12.5 INJECTION, SOLUTION INTRAVENOUS PRN
Status: DISCONTINUED | OUTPATIENT
Start: 2019-01-01 | End: 2019-01-01

## 2019-01-01 RX ORDER — METHYLPREDNISOLONE SODIUM SUCCINATE 40 MG/ML
40 INJECTION, POWDER, LYOPHILIZED, FOR SOLUTION INTRAMUSCULAR; INTRAVENOUS DAILY
Status: DISCONTINUED | OUTPATIENT
Start: 2019-01-01 | End: 2019-01-01

## 2019-01-01 RX ORDER — PREDNISONE 20 MG/1
20 TABLET ORAL DAILY
Status: DISCONTINUED | OUTPATIENT
Start: 2019-01-01 | End: 2019-01-01 | Stop reason: HOSPADM

## 2019-01-01 RX ORDER — LEVOFLOXACIN 5 MG/ML
750 INJECTION, SOLUTION INTRAVENOUS ONCE
Status: COMPLETED | OUTPATIENT
Start: 2019-01-01 | End: 2019-01-01

## 2019-01-01 RX ORDER — DEXTROMETHORPHAN HYDROBROMIDE AND PROMETHAZINE HYDROCHLORIDE 15; 6.25 MG/5ML; MG/5ML
5 SYRUP ORAL 4 TIMES DAILY PRN
Qty: 120 ML | Refills: 0 | Status: SHIPPED | OUTPATIENT
Start: 2019-01-01 | End: 2019-01-01

## 2019-01-01 RX ORDER — 0.9 % SODIUM CHLORIDE 0.9 %
30 INTRAVENOUS SOLUTION INTRAVENOUS ONCE
Status: COMPLETED | OUTPATIENT
Start: 2019-01-01 | End: 2019-01-01

## 2019-01-01 RX ORDER — PROMETHAZINE HYDROCHLORIDE AND CODEINE PHOSPHATE 6.25; 1 MG/5ML; MG/5ML
5 SYRUP ORAL EVERY 4 HOURS PRN
Qty: 118 ML | Refills: 0 | OUTPATIENT
Start: 2019-01-01 | End: 2019-01-01

## 2019-01-01 RX ORDER — PREDNISONE 20 MG/1
20 TABLET ORAL DAILY
Qty: 3 TABLET | Refills: 0 | Status: SHIPPED | OUTPATIENT
Start: 2019-01-01 | End: 2019-01-01

## 2019-01-01 RX ORDER — NICOTINE POLACRILEX 4 MG
15 LOZENGE BUCCAL PRN
Status: DISCONTINUED | OUTPATIENT
Start: 2019-01-01 | End: 2019-01-01

## 2019-01-01 RX ORDER — PROPOFOL 10 MG/ML
10 INJECTION, EMULSION INTRAVENOUS
Status: DISCONTINUED | OUTPATIENT
Start: 2019-01-01 | End: 2019-01-01

## 2019-01-01 RX ORDER — 0.9 % SODIUM CHLORIDE 0.9 %
10 VIAL (ML) INJECTION DAILY
Status: DISCONTINUED | OUTPATIENT
Start: 2019-01-01 | End: 2019-01-01

## 2019-01-01 RX ORDER — METHYLPREDNISOLONE SODIUM SUCCINATE 40 MG/ML
40 INJECTION, POWDER, LYOPHILIZED, FOR SOLUTION INTRAMUSCULAR; INTRAVENOUS EVERY 12 HOURS
Status: COMPLETED | OUTPATIENT
Start: 2019-01-01 | End: 2019-01-01

## 2019-01-01 RX ORDER — BUTALBITAL, ACETAMINOPHEN, CAFFEINE AND CODEINE PHOSPHATE 50; 325; 40; 30 MG/1; MG/1; MG/1; MG/1
1 CAPSULE ORAL EVERY 6 HOURS PRN
COMMUNITY
End: 2019-01-01

## 2019-01-01 RX ORDER — CHLORHEXIDINE GLUCONATE 0.12 MG/ML
15 RINSE ORAL 2 TIMES DAILY
Status: DISCONTINUED | OUTPATIENT
Start: 2019-01-01 | End: 2019-01-01

## 2019-01-01 RX ORDER — FORMOTEROL FUMARATE 20 UG/2ML
20 SOLUTION RESPIRATORY (INHALATION) 2 TIMES DAILY
Status: DISCONTINUED | OUTPATIENT
Start: 2019-01-01 | End: 2019-01-01 | Stop reason: HOSPADM

## 2019-01-01 RX ORDER — LORAZEPAM 2 MG/ML
1 INJECTION INTRAMUSCULAR EVERY 4 HOURS PRN
Status: DISCONTINUED | OUTPATIENT
Start: 2019-01-01 | End: 2019-01-01 | Stop reason: HOSPADM

## 2019-01-01 RX ORDER — POTASSIUM CHLORIDE 1.5 G/1.77G
40 POWDER, FOR SOLUTION ORAL ONCE
Status: DISCONTINUED | OUTPATIENT
Start: 2019-01-01 | End: 2019-01-01

## 2019-01-01 RX ORDER — POTASSIUM CHLORIDE 29.8 MG/ML
20 INJECTION INTRAVENOUS
Status: COMPLETED | OUTPATIENT
Start: 2019-01-01 | End: 2019-01-01

## 2019-01-01 RX ORDER — IPRATROPIUM BROMIDE AND ALBUTEROL SULFATE 2.5; .5 MG/3ML; MG/3ML
1 SOLUTION RESPIRATORY (INHALATION)
Status: COMPLETED | OUTPATIENT
Start: 2019-01-01 | End: 2019-01-01

## 2019-01-01 RX ORDER — LORAZEPAM 2 MG/ML
1 INJECTION INTRAMUSCULAR EVERY 4 HOURS
Status: DISCONTINUED | OUTPATIENT
Start: 2019-01-01 | End: 2019-01-01

## 2019-01-01 RX ORDER — SUCCINYLCHOLINE CHLORIDE 20 MG/ML
INJECTION INTRAMUSCULAR; INTRAVENOUS
Status: COMPLETED
Start: 2019-01-01 | End: 2019-01-01

## 2019-01-01 RX ORDER — METHYLPREDNISOLONE SODIUM SUCCINATE 40 MG/ML
40 INJECTION, POWDER, LYOPHILIZED, FOR SOLUTION INTRAMUSCULAR; INTRAVENOUS EVERY 6 HOURS
Status: DISCONTINUED | OUTPATIENT
Start: 2019-01-01 | End: 2019-01-01

## 2019-01-01 RX ORDER — FENTANYL CITRATE 50 UG/ML
INJECTION, SOLUTION INTRAMUSCULAR; INTRAVENOUS
Status: COMPLETED
Start: 2019-01-01 | End: 2019-01-01

## 2019-01-01 RX ORDER — SODIUM CHLORIDE 0.9 % (FLUSH) 0.9 %
10 SYRINGE (ML) INJECTION 2 TIMES DAILY
Status: DISCONTINUED | OUTPATIENT
Start: 2019-01-01 | End: 2019-01-01 | Stop reason: HOSPADM

## 2019-01-01 RX ORDER — PROMETHAZINE HYDROCHLORIDE AND CODEINE PHOSPHATE 6.25; 1 MG/5ML; MG/5ML
5 SYRUP ORAL EVERY 4 HOURS PRN
Qty: 118 ML | Refills: 0 | Status: CANCELLED | OUTPATIENT
Start: 2019-01-01 | End: 2019-01-01

## 2019-01-01 RX ORDER — LABETALOL HYDROCHLORIDE 5 MG/ML
10 INJECTION, SOLUTION INTRAVENOUS EVERY 6 HOURS PRN
Status: DISCONTINUED | OUTPATIENT
Start: 2019-01-01 | End: 2019-01-01

## 2019-01-01 RX ORDER — HYDRALAZINE HYDROCHLORIDE 20 MG/ML
10 INJECTION INTRAMUSCULAR; INTRAVENOUS EVERY 6 HOURS PRN
Status: DISCONTINUED | OUTPATIENT
Start: 2019-01-01 | End: 2019-01-01

## 2019-01-01 RX ORDER — FENTANYL CITRATE 50 UG/ML
100 INJECTION, SOLUTION INTRAMUSCULAR; INTRAVENOUS ONCE
Status: COMPLETED | OUTPATIENT
Start: 2019-01-01 | End: 2019-01-01

## 2019-01-01 RX ORDER — BUDESONIDE AND FORMOTEROL FUMARATE DIHYDRATE 160; 4.5 UG/1; UG/1
2 AEROSOL RESPIRATORY (INHALATION) 2 TIMES DAILY
Qty: 1 INHALER | Refills: 5 | Status: SHIPPED | OUTPATIENT
Start: 2019-01-01

## 2019-01-01 RX ORDER — SODIUM CHLORIDE 9 MG/ML
INJECTION, SOLUTION INTRAVENOUS CONTINUOUS
Status: DISCONTINUED | OUTPATIENT
Start: 2019-01-01 | End: 2019-01-01

## 2019-01-01 RX ORDER — PREDNISONE 10 MG/1
10 TABLET ORAL DAILY
Qty: 10 TABLET | Refills: 0 | Status: SHIPPED | OUTPATIENT
Start: 2019-01-01 | End: 2019-01-01

## 2019-01-01 RX ORDER — PREDNISONE 10 MG/1
30 TABLET ORAL DAILY
Qty: 30 TABLET | Refills: 0 | Status: SHIPPED | OUTPATIENT
Start: 2019-01-01 | End: 2019-01-01

## 2019-01-01 RX ORDER — IPRATROPIUM BROMIDE AND ALBUTEROL SULFATE 2.5; .5 MG/3ML; MG/3ML
3 SOLUTION RESPIRATORY (INHALATION) 4 TIMES DAILY
Qty: 360 ML | Refills: 5 | Status: SHIPPED | OUTPATIENT
Start: 2019-01-01

## 2019-01-01 RX ORDER — BUDESONIDE 0.25 MG/2ML
250 INHALANT ORAL 2 TIMES DAILY
Status: DISCONTINUED | OUTPATIENT
Start: 2019-01-01 | End: 2019-01-01 | Stop reason: HOSPADM

## 2019-01-01 RX ORDER — PREDNISONE 10 MG/1
10 TABLET ORAL DAILY
Qty: 3 TABLET | Refills: 0 | Status: SHIPPED | OUTPATIENT
Start: 2019-01-01 | End: 2019-01-01

## 2019-01-01 RX ORDER — PREDNISONE 20 MG/1
40 TABLET ORAL DAILY
Status: DISCONTINUED | OUTPATIENT
Start: 2019-01-01 | End: 2019-01-01

## 2019-01-01 RX ORDER — BUTALBITAL, ASPIRIN, CAFFEINE, AND CODEINE PHOSPHATE 30; 50; 40; 325 MG/1; MG/1; MG/1; MG/1
1 CAPSULE ORAL EVERY 8 HOURS PRN
Qty: 90 CAPSULE | Refills: 0 | Status: SHIPPED | OUTPATIENT
Start: 2019-01-01 | End: 2019-03-27

## 2019-01-01 RX ORDER — ETOMIDATE 2 MG/ML
INJECTION INTRAVENOUS
Status: COMPLETED
Start: 2019-01-01 | End: 2019-01-01

## 2019-01-01 RX ORDER — POTASSIUM CHLORIDE 29.8 MG/ML
40 INJECTION INTRAVENOUS ONCE
Status: COMPLETED | OUTPATIENT
Start: 2019-01-01 | End: 2019-01-01

## 2019-01-01 RX ORDER — PREDNISONE 20 MG/1
40 TABLET ORAL DAILY
Status: DISCONTINUED | OUTPATIENT
Start: 2019-01-01 | End: 2019-01-01 | Stop reason: SDUPTHER

## 2019-01-01 RX ORDER — CODEINE/BUTALBITAL/ASA/CAFFEIN 30-50-325
1 CAPSULE ORAL EVERY 8 HOURS PRN
COMMUNITY

## 2019-01-01 RX ORDER — IPRATROPIUM BROMIDE AND ALBUTEROL SULFATE 2.5; .5 MG/3ML; MG/3ML
1 SOLUTION RESPIRATORY (INHALATION)
Status: DISCONTINUED | OUTPATIENT
Start: 2019-01-01 | End: 2019-01-01

## 2019-01-01 RX ORDER — IPRATROPIUM BROMIDE AND ALBUTEROL SULFATE 2.5; .5 MG/3ML; MG/3ML
1 SOLUTION RESPIRATORY (INHALATION) 4 TIMES DAILY
Status: DISCONTINUED | OUTPATIENT
Start: 2019-01-01 | End: 2019-01-01 | Stop reason: HOSPADM

## 2019-01-01 RX ORDER — PREDNISONE 20 MG/1
20 TABLET ORAL DAILY
Qty: 10 TABLET | Refills: 0 | Status: SHIPPED | OUTPATIENT
Start: 2019-01-01 | End: 2019-01-01

## 2019-01-01 RX ORDER — QUETIAPINE FUMARATE 25 MG/1
25 TABLET, FILM COATED ORAL 2 TIMES DAILY
Status: DISCONTINUED | OUTPATIENT
Start: 2019-01-01 | End: 2019-01-01

## 2019-01-01 RX ORDER — METHYLPREDNISOLONE SODIUM SUCCINATE 40 MG/ML
40 INJECTION, POWDER, LYOPHILIZED, FOR SOLUTION INTRAMUSCULAR; INTRAVENOUS EVERY 12 HOURS
Status: DISCONTINUED | OUTPATIENT
Start: 2019-01-01 | End: 2019-01-01

## 2019-01-01 RX ORDER — QUETIAPINE FUMARATE 25 MG/1
25 TABLET, FILM COATED ORAL NIGHTLY
Status: COMPLETED | OUTPATIENT
Start: 2019-01-01 | End: 2019-01-01

## 2019-01-01 RX ORDER — CODEINE/BUTALBITAL/ASA/CAFFEIN 30-50-325
1 CAPSULE ORAL EVERY 8 HOURS PRN
Qty: 90 CAPSULE | Refills: 0 | Status: SHIPPED | OUTPATIENT
Start: 2019-01-01 | End: 2019-01-01 | Stop reason: SDUPTHER

## 2019-01-01 RX ORDER — PREDNISONE 1 MG/1
10 TABLET ORAL DAILY
Status: DISCONTINUED | OUTPATIENT
Start: 2019-01-01 | End: 2019-01-01 | Stop reason: HOSPADM

## 2019-01-01 RX ADMIN — ENOXAPARIN SODIUM 40 MG: 40 INJECTION SUBCUTANEOUS at 08:12

## 2019-01-01 RX ADMIN — POTASSIUM CHLORIDE 20 MEQ: 29.8 INJECTION, SOLUTION INTRAVENOUS at 10:11

## 2019-01-01 RX ADMIN — CEFTRIAXONE 1 G: 1 INJECTION, POWDER, FOR SOLUTION INTRAMUSCULAR; INTRAVENOUS at 04:27

## 2019-01-01 RX ADMIN — FORMOTEROL FUMARATE DIHYDRATE 20 MCG: 20 SOLUTION RESPIRATORY (INHALATION) at 07:23

## 2019-01-01 RX ADMIN — LORAZEPAM 1 MG: 2 INJECTION INTRAMUSCULAR; INTRAVENOUS at 00:25

## 2019-01-01 RX ADMIN — ACETAMINOPHEN 650 MG: 325 TABLET ORAL at 18:39

## 2019-01-01 RX ADMIN — PANTOPRAZOLE SODIUM 40 MG: 40 INJECTION, POWDER, FOR SOLUTION INTRAVENOUS at 10:36

## 2019-01-01 RX ADMIN — LORAZEPAM 1 MG: 2 INJECTION INTRAMUSCULAR; INTRAVENOUS at 07:57

## 2019-01-01 RX ADMIN — METOPROLOL TARTRATE 25 MG: 25 TABLET ORAL at 09:16

## 2019-01-01 RX ADMIN — CHLORHEXIDINE GLUCONATE 0.12% ORAL RINSE 15 ML: 1.2 LIQUID ORAL at 21:19

## 2019-01-01 RX ADMIN — FORMOTEROL FUMARATE DIHYDRATE 20 MCG: 20 SOLUTION RESPIRATORY (INHALATION) at 20:18

## 2019-01-01 RX ADMIN — IPRATROPIUM BROMIDE AND ALBUTEROL SULFATE 1 AMPULE: .5; 3 SOLUTION RESPIRATORY (INHALATION) at 07:56

## 2019-01-01 RX ADMIN — LORAZEPAM 1 MG: 2 INJECTION INTRAMUSCULAR; INTRAVENOUS at 22:41

## 2019-01-01 RX ADMIN — LORAZEPAM 1 MG: 2 INJECTION INTRAMUSCULAR; INTRAVENOUS at 08:19

## 2019-01-01 RX ADMIN — IPRATROPIUM BROMIDE AND ALBUTEROL SULFATE 1 AMPULE: .5; 3 SOLUTION RESPIRATORY (INHALATION) at 16:51

## 2019-01-01 RX ADMIN — FORMOTEROL FUMARATE DIHYDRATE 20 MCG: 20 SOLUTION RESPIRATORY (INHALATION) at 20:17

## 2019-01-01 RX ADMIN — FORMOTEROL FUMARATE DIHYDRATE 20 MCG: 20 SOLUTION RESPIRATORY (INHALATION) at 07:32

## 2019-01-01 RX ADMIN — METHYLPREDNISOLONE SODIUM SUCCINATE 40 MG: 40 INJECTION, POWDER, FOR SOLUTION INTRAMUSCULAR; INTRAVENOUS at 16:48

## 2019-01-01 RX ADMIN — IPRATROPIUM BROMIDE AND ALBUTEROL SULFATE 1 AMPULE: .5; 3 SOLUTION RESPIRATORY (INHALATION) at 11:10

## 2019-01-01 RX ADMIN — Medication 10 ML: at 21:05

## 2019-01-01 RX ADMIN — BUDESONIDE 250 MCG: 0.25 SUSPENSION RESPIRATORY (INHALATION) at 08:02

## 2019-01-01 RX ADMIN — METHYLPREDNISOLONE SODIUM SUCCINATE 40 MG: 40 INJECTION, POWDER, FOR SOLUTION INTRAMUSCULAR; INTRAVENOUS at 09:19

## 2019-01-01 RX ADMIN — METHYLPREDNISOLONE SODIUM SUCCINATE 40 MG: 40 INJECTION, POWDER, FOR SOLUTION INTRAMUSCULAR; INTRAVENOUS at 09:04

## 2019-01-01 RX ADMIN — IPRATROPIUM BROMIDE AND ALBUTEROL SULFATE 1 AMPULE: .5; 3 SOLUTION RESPIRATORY (INHALATION) at 11:31

## 2019-01-01 RX ADMIN — PROPOFOL 10 MCG/KG/MIN: 10 INJECTION, EMULSION INTRAVENOUS at 05:19

## 2019-01-01 RX ADMIN — ACETAMINOPHEN 650 MG: 325 TABLET ORAL at 02:03

## 2019-01-01 RX ADMIN — IPRATROPIUM BROMIDE AND ALBUTEROL SULFATE 1 AMPULE: .5; 3 SOLUTION RESPIRATORY (INHALATION) at 07:44

## 2019-01-01 RX ADMIN — IPRATROPIUM BROMIDE AND ALBUTEROL SULFATE 1 AMPULE: .5; 3 SOLUTION RESPIRATORY (INHALATION) at 19:25

## 2019-01-01 RX ADMIN — DEXMEDETOMIDINE 0.6 MCG/KG/HR: 100 INJECTION, SOLUTION, CONCENTRATE INTRAVENOUS at 23:22

## 2019-01-01 RX ADMIN — PREDNISONE 40 MG: 20 TABLET ORAL at 08:13

## 2019-01-01 RX ADMIN — LORAZEPAM 1 MG: 2 INJECTION INTRAMUSCULAR; INTRAVENOUS at 11:58

## 2019-01-01 RX ADMIN — IPRATROPIUM BROMIDE AND ALBUTEROL SULFATE 1 AMPULE: .5; 3 SOLUTION RESPIRATORY (INHALATION) at 19:33

## 2019-01-01 RX ADMIN — IPRATROPIUM BROMIDE AND ALBUTEROL SULFATE 1 AMPULE: .5; 3 SOLUTION RESPIRATORY (INHALATION) at 04:20

## 2019-01-01 RX ADMIN — INSULIN LISPRO 2 UNITS: 100 INJECTION, SOLUTION INTRAVENOUS; SUBCUTANEOUS at 00:16

## 2019-01-01 RX ADMIN — Medication 10 ML: at 08:12

## 2019-01-01 RX ADMIN — IPRATROPIUM BROMIDE AND ALBUTEROL SULFATE 1 AMPULE: .5; 3 SOLUTION RESPIRATORY (INHALATION) at 19:09

## 2019-01-01 RX ADMIN — IPRATROPIUM BROMIDE AND ALBUTEROL SULFATE 1 AMPULE: .5; 3 SOLUTION RESPIRATORY (INHALATION) at 07:39

## 2019-01-01 RX ADMIN — PROPOFOL 10 MCG/KG/MIN: 10 INJECTION, EMULSION INTRAVENOUS at 07:38

## 2019-01-01 RX ADMIN — ACETAMINOPHEN 650 MG: 325 TABLET ORAL at 10:32

## 2019-01-01 RX ADMIN — Medication 75 MCG/HR: at 18:06

## 2019-01-01 RX ADMIN — IPRATROPIUM BROMIDE AND ALBUTEROL SULFATE 1 AMPULE: .5; 3 SOLUTION RESPIRATORY (INHALATION) at 07:18

## 2019-01-01 RX ADMIN — METHYLPREDNISOLONE SODIUM SUCCINATE 40 MG: 40 INJECTION, POWDER, FOR SOLUTION INTRAMUSCULAR; INTRAVENOUS at 20:47

## 2019-01-01 RX ADMIN — QUETIAPINE FUMARATE 25 MG: 25 TABLET ORAL at 09:19

## 2019-01-01 RX ADMIN — IPRATROPIUM BROMIDE AND ALBUTEROL SULFATE 1 AMPULE: .5; 3 SOLUTION RESPIRATORY (INHALATION) at 11:33

## 2019-01-01 RX ADMIN — CHLORHEXIDINE GLUCONATE 0.12% ORAL RINSE 15 ML: 1.2 LIQUID ORAL at 08:17

## 2019-01-01 RX ADMIN — ACETAMINOPHEN 650 MG: 325 TABLET ORAL at 02:08

## 2019-01-01 RX ADMIN — METOPROLOL TARTRATE 25 MG: 25 TABLET ORAL at 22:42

## 2019-01-01 RX ADMIN — IPRATROPIUM BROMIDE AND ALBUTEROL SULFATE 1 AMPULE: .5; 3 SOLUTION RESPIRATORY (INHALATION) at 15:41

## 2019-01-01 RX ADMIN — METHYLPREDNISOLONE SODIUM SUCCINATE 40 MG: 40 INJECTION, POWDER, FOR SOLUTION INTRAMUSCULAR; INTRAVENOUS at 09:40

## 2019-01-01 RX ADMIN — LORAZEPAM 1 MG: 2 INJECTION INTRAMUSCULAR; INTRAVENOUS at 17:39

## 2019-01-01 RX ADMIN — IPRATROPIUM BROMIDE AND ALBUTEROL SULFATE 1 AMPULE: .5; 3 SOLUTION RESPIRATORY (INHALATION) at 08:09

## 2019-01-01 RX ADMIN — Medication 75 MCG/HR: at 20:48

## 2019-01-01 RX ADMIN — LORAZEPAM 1 MG: 2 INJECTION INTRAMUSCULAR; INTRAVENOUS at 08:17

## 2019-01-01 RX ADMIN — IPRATROPIUM BROMIDE AND ALBUTEROL SULFATE 1 AMPULE: .5; 3 SOLUTION RESPIRATORY (INHALATION) at 15:17

## 2019-01-01 RX ADMIN — POTASSIUM CHLORIDE 20 MEQ: 400 INJECTION, SOLUTION INTRAVENOUS at 12:33

## 2019-01-01 RX ADMIN — IPRATROPIUM BROMIDE AND ALBUTEROL SULFATE 1 AMPULE: .5; 3 SOLUTION RESPIRATORY (INHALATION) at 13:19

## 2019-01-01 RX ADMIN — QUETIAPINE FUMARATE 25 MG: 25 TABLET ORAL at 20:46

## 2019-01-01 RX ADMIN — Medication 25 MCG/HR: at 04:28

## 2019-01-01 RX ADMIN — IPRATROPIUM BROMIDE AND ALBUTEROL SULFATE 1 AMPULE: .5; 3 SOLUTION RESPIRATORY (INHALATION) at 17:02

## 2019-01-01 RX ADMIN — INSULIN LISPRO 2 UNITS: 100 INJECTION, SOLUTION INTRAVENOUS; SUBCUTANEOUS at 05:40

## 2019-01-01 RX ADMIN — ENOXAPARIN SODIUM 40 MG: 40 INJECTION SUBCUTANEOUS at 09:16

## 2019-01-01 RX ADMIN — SODIUM CHLORIDE 100 ML/HR: 9 INJECTION, SOLUTION INTRAVENOUS at 20:34

## 2019-01-01 RX ADMIN — Medication 10 ML: at 20:16

## 2019-01-01 RX ADMIN — IPRATROPIUM BROMIDE AND ALBUTEROL SULFATE 1 AMPULE: .5; 3 SOLUTION RESPIRATORY (INHALATION) at 09:21

## 2019-01-01 RX ADMIN — Medication 10 ML: at 20:44

## 2019-01-01 RX ADMIN — POTASSIUM CHLORIDE 20 MEQ: 29.8 INJECTION, SOLUTION INTRAVENOUS at 10:59

## 2019-01-01 RX ADMIN — METHYLPREDNISOLONE SODIUM SUCCINATE 40 MG: 40 INJECTION, POWDER, FOR SOLUTION INTRAMUSCULAR; INTRAVENOUS at 08:44

## 2019-01-01 RX ADMIN — BUDESONIDE 250 MCG: 0.25 SUSPENSION RESPIRATORY (INHALATION) at 20:41

## 2019-01-01 RX ADMIN — AZITHROMYCIN MONOHYDRATE 500 MG: 500 INJECTION, POWDER, LYOPHILIZED, FOR SOLUTION INTRAVENOUS at 09:40

## 2019-01-01 RX ADMIN — IPRATROPIUM BROMIDE AND ALBUTEROL SULFATE 1 AMPULE: .5; 3 SOLUTION RESPIRATORY (INHALATION) at 20:24

## 2019-01-01 RX ADMIN — ENOXAPARIN SODIUM 40 MG: 40 INJECTION SUBCUTANEOUS at 08:18

## 2019-01-01 RX ADMIN — METHYLPREDNISOLONE SODIUM SUCCINATE 40 MG: 40 INJECTION, POWDER, FOR SOLUTION INTRAMUSCULAR; INTRAVENOUS at 10:36

## 2019-01-01 RX ADMIN — IPRATROPIUM BROMIDE AND ALBUTEROL SULFATE 1 AMPULE: .5; 3 SOLUTION RESPIRATORY (INHALATION) at 20:18

## 2019-01-01 RX ADMIN — PROPOFOL 25 MCG/KG/MIN: 10 INJECTION, EMULSION INTRAVENOUS at 12:02

## 2019-01-01 RX ADMIN — IPRATROPIUM BROMIDE AND ALBUTEROL SULFATE 1 AMPULE: .5; 3 SOLUTION RESPIRATORY (INHALATION) at 11:12

## 2019-01-01 RX ADMIN — SODIUM CHLORIDE 1647 ML: 9 INJECTION, SOLUTION INTRAVENOUS at 08:30

## 2019-01-01 RX ADMIN — INSULIN LISPRO 2 UNITS: 100 INJECTION, SOLUTION INTRAVENOUS; SUBCUTANEOUS at 11:49

## 2019-01-01 RX ADMIN — METOPROLOL TARTRATE 25 MG: 25 TABLET ORAL at 08:13

## 2019-01-01 RX ADMIN — FORMOTEROL FUMARATE DIHYDRATE 20 MCG: 20 SOLUTION RESPIRATORY (INHALATION) at 08:02

## 2019-01-01 RX ADMIN — ENOXAPARIN SODIUM 40 MG: 40 INJECTION SUBCUTANEOUS at 09:00

## 2019-01-01 RX ADMIN — Medication 75 MCG/HR: at 00:11

## 2019-01-01 RX ADMIN — LEVOFLOXACIN 750 MG: 5 INJECTION, SOLUTION INTRAVENOUS at 04:56

## 2019-01-01 RX ADMIN — METOPROLOL TARTRATE 25 MG: 25 TABLET ORAL at 09:01

## 2019-01-01 RX ADMIN — CHLORHEXIDINE GLUCONATE 0.12% ORAL RINSE 15 ML: 1.2 LIQUID ORAL at 20:37

## 2019-01-01 RX ADMIN — Medication 10 ML: at 21:15

## 2019-01-01 RX ADMIN — IPRATROPIUM BROMIDE AND ALBUTEROL SULFATE 1 AMPULE: .5; 3 SOLUTION RESPIRATORY (INHALATION) at 16:22

## 2019-01-01 RX ADMIN — METOPROLOL TARTRATE 25 MG: 25 TABLET ORAL at 10:42

## 2019-01-01 RX ADMIN — QUETIAPINE FUMARATE 25 MG: 25 TABLET ORAL at 20:48

## 2019-01-01 RX ADMIN — INSULIN LISPRO 2 UNITS: 100 INJECTION, SOLUTION INTRAVENOUS; SUBCUTANEOUS at 12:47

## 2019-01-01 RX ADMIN — IPRATROPIUM BROMIDE AND ALBUTEROL SULFATE 1 AMPULE: .5; 3 SOLUTION RESPIRATORY (INHALATION) at 07:16

## 2019-01-01 RX ADMIN — Medication 10 ML: at 09:31

## 2019-01-01 RX ADMIN — METOPROLOL TARTRATE 25 MG: 25 TABLET ORAL at 20:54

## 2019-01-01 RX ADMIN — NOREPINEPHRINE BITARTRATE 2 MCG/MIN: 1 INJECTION INTRAVENOUS at 06:38

## 2019-01-01 RX ADMIN — IPRATROPIUM BROMIDE AND ALBUTEROL SULFATE 1 AMPULE: .5; 3 SOLUTION RESPIRATORY (INHALATION) at 07:23

## 2019-01-01 RX ADMIN — IPRATROPIUM BROMIDE AND ALBUTEROL SULFATE 1 AMPULE: .5; 3 SOLUTION RESPIRATORY (INHALATION) at 19:28

## 2019-01-01 RX ADMIN — PROPOFOL 25 MCG/KG/MIN: 10 INJECTION, EMULSION INTRAVENOUS at 06:28

## 2019-01-01 RX ADMIN — LORAZEPAM 1 MG: 2 INJECTION INTRAMUSCULAR; INTRAVENOUS at 00:16

## 2019-01-01 RX ADMIN — ACETAMINOPHEN 650 MG: 325 TABLET ORAL at 04:49

## 2019-01-01 RX ADMIN — CEFTRIAXONE 2 G: 2 INJECTION, POWDER, FOR SOLUTION INTRAMUSCULAR; INTRAVENOUS at 04:19

## 2019-01-01 RX ADMIN — Medication 75 MCG/HR: at 07:02

## 2019-01-01 RX ADMIN — METOPROLOL TARTRATE 25 MG: 25 TABLET ORAL at 21:10

## 2019-01-01 RX ADMIN — CEFTRIAXONE 1 G: 1 INJECTION, POWDER, FOR SOLUTION INTRAMUSCULAR; INTRAVENOUS at 04:24

## 2019-01-01 RX ADMIN — PANTOPRAZOLE SODIUM 40 MG: 40 INJECTION, POWDER, FOR SOLUTION INTRAVENOUS at 10:57

## 2019-01-01 RX ADMIN — HYDRALAZINE HYDROCHLORIDE 10 MG: 20 INJECTION INTRAMUSCULAR; INTRAVENOUS at 08:06

## 2019-01-01 RX ADMIN — NOREPINEPHRINE BITARTRATE 18 MCG/MIN: 1 INJECTION INTRAVENOUS at 18:19

## 2019-01-01 RX ADMIN — PREDNISONE 40 MG: 20 TABLET ORAL at 09:16

## 2019-01-01 RX ADMIN — FORMOTEROL FUMARATE DIHYDRATE 20 MCG: 20 SOLUTION RESPIRATORY (INHALATION) at 19:09

## 2019-01-01 RX ADMIN — LORAZEPAM 1 MG: 2 INJECTION INTRAMUSCULAR; INTRAVENOUS at 20:30

## 2019-01-01 RX ADMIN — AZITHROMYCIN MONOHYDRATE 500 MG: 500 INJECTION, POWDER, LYOPHILIZED, FOR SOLUTION INTRAVENOUS at 10:36

## 2019-01-01 RX ADMIN — IPRATROPIUM BROMIDE AND ALBUTEROL SULFATE 1 AMPULE: .5; 3 SOLUTION RESPIRATORY (INHALATION) at 12:09

## 2019-01-01 RX ADMIN — ENOXAPARIN SODIUM 40 MG: 40 INJECTION SUBCUTANEOUS at 10:57

## 2019-01-01 RX ADMIN — ACETAMINOPHEN 650 MG: 325 TABLET ORAL at 09:16

## 2019-01-01 RX ADMIN — LORAZEPAM 1 MG: 2 INJECTION INTRAMUSCULAR; INTRAVENOUS at 04:27

## 2019-01-01 RX ADMIN — Medication 10 ML: at 22:42

## 2019-01-01 RX ADMIN — INSULIN LISPRO 2 UNITS: 100 INJECTION, SOLUTION INTRAVENOUS; SUBCUTANEOUS at 17:31

## 2019-01-01 RX ADMIN — ENOXAPARIN SODIUM 40 MG: 40 INJECTION SUBCUTANEOUS at 09:33

## 2019-01-01 RX ADMIN — Medication 10 ML: at 07:37

## 2019-01-01 RX ADMIN — Medication 10 ML: at 08:18

## 2019-01-01 RX ADMIN — INSULIN LISPRO 2 UNITS: 100 INJECTION, SOLUTION INTRAVENOUS; SUBCUTANEOUS at 17:09

## 2019-01-01 RX ADMIN — QUETIAPINE FUMARATE 25 MG: 25 TABLET ORAL at 20:16

## 2019-01-01 RX ADMIN — LORAZEPAM 1 MG: 2 INJECTION INTRAMUSCULAR; INTRAVENOUS at 20:06

## 2019-01-01 RX ADMIN — Medication 75 MCG/HR: at 11:11

## 2019-01-01 RX ADMIN — METOPROLOL TARTRATE 25 MG: 25 TABLET ORAL at 21:05

## 2019-01-01 RX ADMIN — METHYLPREDNISOLONE SODIUM SUCCINATE 40 MG: 40 INJECTION, POWDER, FOR SOLUTION INTRAMUSCULAR; INTRAVENOUS at 17:34

## 2019-01-01 RX ADMIN — ENOXAPARIN SODIUM 40 MG: 40 INJECTION SUBCUTANEOUS at 10:36

## 2019-01-01 RX ADMIN — PROPOFOL 25 MCG/KG/MIN: 10 INJECTION, EMULSION INTRAVENOUS at 20:42

## 2019-01-01 RX ADMIN — ENOXAPARIN SODIUM 40 MG: 40 INJECTION SUBCUTANEOUS at 08:15

## 2019-01-01 RX ADMIN — INSULIN LISPRO 4 UNITS: 100 INJECTION, SOLUTION INTRAVENOUS; SUBCUTANEOUS at 00:16

## 2019-01-01 RX ADMIN — FORMOTEROL FUMARATE DIHYDRATE 20 MCG: 20 SOLUTION RESPIRATORY (INHALATION) at 09:21

## 2019-01-01 RX ADMIN — IPRATROPIUM BROMIDE AND ALBUTEROL SULFATE 1 AMPULE: .5; 3 SOLUTION RESPIRATORY (INHALATION) at 12:04

## 2019-01-01 RX ADMIN — INSULIN LISPRO 2 UNITS: 100 INJECTION, SOLUTION INTRAVENOUS; SUBCUTANEOUS at 12:07

## 2019-01-01 RX ADMIN — Medication 75 MCG/HR: at 17:41

## 2019-01-01 RX ADMIN — INSULIN LISPRO 2 UNITS: 100 INJECTION, SOLUTION INTRAVENOUS; SUBCUTANEOUS at 06:23

## 2019-01-01 RX ADMIN — IPRATROPIUM BROMIDE AND ALBUTEROL SULFATE 1 AMPULE: .5; 3 SOLUTION RESPIRATORY (INHALATION) at 04:10

## 2019-01-01 RX ADMIN — IPRATROPIUM BROMIDE AND ALBUTEROL SULFATE 1 AMPULE: .5; 3 SOLUTION RESPIRATORY (INHALATION) at 16:27

## 2019-01-01 RX ADMIN — ENOXAPARIN SODIUM 40 MG: 40 INJECTION SUBCUTANEOUS at 08:44

## 2019-01-01 RX ADMIN — INSULIN LISPRO 2 UNITS: 100 INJECTION, SOLUTION INTRAVENOUS; SUBCUTANEOUS at 00:29

## 2019-01-01 RX ADMIN — IPRATROPIUM BROMIDE AND ALBUTEROL SULFATE 1 AMPULE: .5; 3 SOLUTION RESPIRATORY (INHALATION) at 16:25

## 2019-01-01 RX ADMIN — METOPROLOL TARTRATE 25 MG: 25 TABLET ORAL at 09:27

## 2019-01-01 RX ADMIN — IPRATROPIUM BROMIDE AND ALBUTEROL SULFATE 1 AMPULE: .5; 3 SOLUTION RESPIRATORY (INHALATION) at 16:50

## 2019-01-01 RX ADMIN — IPRATROPIUM BROMIDE AND ALBUTEROL SULFATE 1 AMPULE: .5; 3 SOLUTION RESPIRATORY (INHALATION) at 11:19

## 2019-01-01 RX ADMIN — Medication 10 ML: at 08:44

## 2019-01-01 RX ADMIN — ACETAMINOPHEN 650 MG: 325 TABLET ORAL at 14:53

## 2019-01-01 RX ADMIN — INSULIN LISPRO 4 UNITS: 100 INJECTION, SOLUTION INTRAVENOUS; SUBCUTANEOUS at 00:21

## 2019-01-01 RX ADMIN — ACETAMINOPHEN 650 MG: 325 TABLET ORAL at 13:51

## 2019-01-01 RX ADMIN — Medication 10 ML: at 09:02

## 2019-01-01 RX ADMIN — INSULIN LISPRO 2 UNITS: 100 INJECTION, SOLUTION INTRAVENOUS; SUBCUTANEOUS at 12:30

## 2019-01-01 RX ADMIN — ACETAMINOPHEN 650 MG: 325 TABLET ORAL at 14:28

## 2019-01-01 RX ADMIN — ACETAMINOPHEN 650 MG: 325 TABLET ORAL at 21:45

## 2019-01-01 RX ADMIN — LORAZEPAM 1 MG: 2 INJECTION INTRAMUSCULAR; INTRAVENOUS at 04:15

## 2019-01-01 RX ADMIN — FORMOTEROL FUMARATE DIHYDRATE 20 MCG: 20 SOLUTION RESPIRATORY (INHALATION) at 21:15

## 2019-01-01 RX ADMIN — METHYLPREDNISOLONE SODIUM SUCCINATE 40 MG: 40 INJECTION, POWDER, FOR SOLUTION INTRAMUSCULAR; INTRAVENOUS at 17:00

## 2019-01-01 RX ADMIN — METOPROLOL TARTRATE 25 MG: 25 TABLET ORAL at 20:10

## 2019-01-01 RX ADMIN — FENTANYL CITRATE 100 MCG: 50 INJECTION, SOLUTION INTRAMUSCULAR; INTRAVENOUS at 04:14

## 2019-01-01 RX ADMIN — METHYLPREDNISOLONE SODIUM SUCCINATE 40 MG: 40 INJECTION, POWDER, FOR SOLUTION INTRAMUSCULAR; INTRAVENOUS at 04:30

## 2019-01-01 RX ADMIN — AZITHROMYCIN MONOHYDRATE 500 MG: 500 INJECTION, POWDER, LYOPHILIZED, FOR SOLUTION INTRAVENOUS at 07:45

## 2019-01-01 RX ADMIN — IPRATROPIUM BROMIDE AND ALBUTEROL SULFATE 1 AMPULE: .5; 3 SOLUTION RESPIRATORY (INHALATION) at 03:39

## 2019-01-01 RX ADMIN — CEFTRIAXONE 1 G: 1 INJECTION, POWDER, FOR SOLUTION INTRAMUSCULAR; INTRAVENOUS at 03:54

## 2019-01-01 RX ADMIN — QUETIAPINE FUMARATE 25 MG: 25 TABLET ORAL at 08:01

## 2019-01-01 RX ADMIN — INSULIN LISPRO 2 UNITS: 100 INJECTION, SOLUTION INTRAVENOUS; SUBCUTANEOUS at 17:26

## 2019-01-01 RX ADMIN — CEFTRIAXONE 1 G: 1 INJECTION, POWDER, FOR SOLUTION INTRAMUSCULAR; INTRAVENOUS at 05:58

## 2019-01-01 RX ADMIN — ENOXAPARIN SODIUM 40 MG: 40 INJECTION SUBCUTANEOUS at 08:13

## 2019-01-01 RX ADMIN — SODIUM CHLORIDE: 9 INJECTION, SOLUTION INTRAVENOUS at 11:11

## 2019-01-01 RX ADMIN — METHYLPREDNISOLONE SODIUM SUCCINATE 40 MG: 40 INJECTION, POWDER, FOR SOLUTION INTRAMUSCULAR; INTRAVENOUS at 20:17

## 2019-01-01 RX ADMIN — NOREPINEPHRINE BITARTRATE 5 MCG/MIN: 1 INJECTION INTRAVENOUS at 19:30

## 2019-01-01 RX ADMIN — DEXMEDETOMIDINE 0.6 MCG/KG/HR: 100 INJECTION, SOLUTION, CONCENTRATE INTRAVENOUS at 08:03

## 2019-01-01 RX ADMIN — PANTOPRAZOLE SODIUM 40 MG: 40 INJECTION, POWDER, FOR SOLUTION INTRAVENOUS at 08:17

## 2019-01-01 RX ADMIN — INSULIN LISPRO 2 UNITS: 100 INJECTION, SOLUTION INTRAVENOUS; SUBCUTANEOUS at 05:56

## 2019-01-01 RX ADMIN — Medication 75 MCG/HR: at 14:10

## 2019-01-01 RX ADMIN — INSULIN LISPRO 2 UNITS: 100 INJECTION, SOLUTION INTRAVENOUS; SUBCUTANEOUS at 00:13

## 2019-01-01 RX ADMIN — FORMOTEROL FUMARATE DIHYDRATE 20 MCG: 20 SOLUTION RESPIRATORY (INHALATION) at 07:44

## 2019-01-01 RX ADMIN — METOPROLOL TARTRATE 25 MG: 25 TABLET ORAL at 08:44

## 2019-01-01 RX ADMIN — FORMOTEROL FUMARATE DIHYDRATE 20 MCG: 20 SOLUTION RESPIRATORY (INHALATION) at 09:19

## 2019-01-01 RX ADMIN — ENOXAPARIN SODIUM 40 MG: 40 INJECTION SUBCUTANEOUS at 09:31

## 2019-01-01 RX ADMIN — AZITHROMYCIN MONOHYDRATE 500 MG: 500 INJECTION, POWDER, LYOPHILIZED, FOR SOLUTION INTRAVENOUS at 09:31

## 2019-01-01 RX ADMIN — Medication 75 MCG/HR: at 04:27

## 2019-01-01 RX ADMIN — Medication 25 MCG/HR: at 12:50

## 2019-01-01 RX ADMIN — IPRATROPIUM BROMIDE AND ALBUTEROL SULFATE 1 AMPULE: .5; 3 SOLUTION RESPIRATORY (INHALATION) at 12:27

## 2019-01-01 RX ADMIN — ETOMIDATE 20 MG: 2 INJECTION INTRAVENOUS at 03:47

## 2019-01-01 RX ADMIN — CHLORHEXIDINE GLUCONATE 0.12% ORAL RINSE 15 ML: 1.2 LIQUID ORAL at 08:44

## 2019-01-01 RX ADMIN — IPRATROPIUM BROMIDE AND ALBUTEROL SULFATE 1 AMPULE: .5; 3 SOLUTION RESPIRATORY (INHALATION) at 14:55

## 2019-01-01 RX ADMIN — IPRATROPIUM BROMIDE AND ALBUTEROL SULFATE 1 AMPULE: .5; 3 SOLUTION RESPIRATORY (INHALATION) at 20:41

## 2019-01-01 RX ADMIN — CHLORHEXIDINE GLUCONATE 0.12% ORAL RINSE 15 ML: 1.2 LIQUID ORAL at 10:36

## 2019-01-01 RX ADMIN — Medication 10 ML: at 08:04

## 2019-01-01 RX ADMIN — METHYLPREDNISOLONE SODIUM SUCCINATE 40 MG: 40 INJECTION, POWDER, FOR SOLUTION INTRAMUSCULAR; INTRAVENOUS at 20:54

## 2019-01-01 RX ADMIN — IPRATROPIUM BROMIDE AND ALBUTEROL SULFATE 1 AMPULE: .5; 3 SOLUTION RESPIRATORY (INHALATION) at 15:56

## 2019-01-01 RX ADMIN — QUETIAPINE FUMARATE 25 MG: 25 TABLET ORAL at 09:01

## 2019-01-01 RX ADMIN — ENOXAPARIN SODIUM 40 MG: 40 INJECTION SUBCUTANEOUS at 07:41

## 2019-01-01 RX ADMIN — ACETAMINOPHEN 650 MG: 325 TABLET ORAL at 22:42

## 2019-01-01 RX ADMIN — POTASSIUM CHLORIDE 20 MEQ: 400 INJECTION, SOLUTION INTRAVENOUS at 11:44

## 2019-01-01 RX ADMIN — IPRATROPIUM BROMIDE AND ALBUTEROL SULFATE 1 AMPULE: .5; 3 SOLUTION RESPIRATORY (INHALATION) at 20:17

## 2019-01-01 RX ADMIN — CEFTRIAXONE 1 G: 1 INJECTION, POWDER, FOR SOLUTION INTRAMUSCULAR; INTRAVENOUS at 05:45

## 2019-01-01 RX ADMIN — LORAZEPAM 1 MG: 2 INJECTION INTRAMUSCULAR; INTRAVENOUS at 17:14

## 2019-01-01 RX ADMIN — METHYLPREDNISOLONE SODIUM SUCCINATE 40 MG: 40 INJECTION, POWDER, FOR SOLUTION INTRAMUSCULAR; INTRAVENOUS at 05:41

## 2019-01-01 RX ADMIN — IPRATROPIUM BROMIDE AND ALBUTEROL SULFATE 1 AMPULE: .5; 3 SOLUTION RESPIRATORY (INHALATION) at 08:05

## 2019-01-01 RX ADMIN — BUDESONIDE 250 MCG: 0.25 SUSPENSION RESPIRATORY (INHALATION) at 09:22

## 2019-01-01 RX ADMIN — ACETAMINOPHEN 650 MG: 325 TABLET ORAL at 14:54

## 2019-01-01 RX ADMIN — CHLORHEXIDINE GLUCONATE 0.12% ORAL RINSE 15 ML: 1.2 LIQUID ORAL at 20:06

## 2019-01-01 RX ADMIN — METOPROLOL TARTRATE 25 MG: 25 TABLET ORAL at 20:44

## 2019-01-01 RX ADMIN — QUETIAPINE FUMARATE 25 MG: 25 TABLET ORAL at 21:10

## 2019-01-01 RX ADMIN — METOPROLOL TARTRATE 25 MG: 25 TABLET ORAL at 09:31

## 2019-01-01 RX ADMIN — Medication 10 ML: at 09:27

## 2019-01-01 RX ADMIN — IPRATROPIUM BROMIDE AND ALBUTEROL SULFATE 1 AMPULE: .5; 3 SOLUTION RESPIRATORY (INHALATION) at 15:37

## 2019-01-01 RX ADMIN — METOPROLOL TARTRATE 25 MG: 25 TABLET ORAL at 20:16

## 2019-01-01 RX ADMIN — IPRATROPIUM BROMIDE AND ALBUTEROL SULFATE 1 AMPULE: .5; 3 SOLUTION RESPIRATORY (INHALATION) at 11:34

## 2019-01-01 RX ADMIN — FORMOTEROL FUMARATE DIHYDRATE 20 MCG: 20 SOLUTION RESPIRATORY (INHALATION) at 19:23

## 2019-01-01 RX ADMIN — PREDNISONE 30 MG: 5 TABLET ORAL at 08:13

## 2019-01-01 RX ADMIN — BUDESONIDE 250 MCG: 0.25 SUSPENSION RESPIRATORY (INHALATION) at 21:15

## 2019-01-01 RX ADMIN — SUCCINYLCHOLINE CHLORIDE 100 MG: 20 INJECTION, SOLUTION INTRAMUSCULAR; INTRAVENOUS at 03:47

## 2019-01-01 RX ADMIN — METOPROLOL TARTRATE 25 MG: 25 TABLET ORAL at 10:39

## 2019-01-01 RX ADMIN — INSULIN LISPRO 2 UNITS: 100 INJECTION, SOLUTION INTRAVENOUS; SUBCUTANEOUS at 17:47

## 2019-01-01 RX ADMIN — Medication 10 ML: at 20:11

## 2019-01-01 RX ADMIN — SODIUM CHLORIDE 100 ML/HR: 9 INJECTION, SOLUTION INTRAVENOUS at 06:03

## 2019-01-01 RX ADMIN — METHYLPREDNISOLONE SODIUM SUCCINATE 40 MG: 40 INJECTION, POWDER, FOR SOLUTION INTRAMUSCULAR; INTRAVENOUS at 15:09

## 2019-01-01 RX ADMIN — IPRATROPIUM BROMIDE AND ALBUTEROL SULFATE 1 AMPULE: .5; 3 SOLUTION RESPIRATORY (INHALATION) at 19:34

## 2019-01-01 RX ADMIN — QUETIAPINE FUMARATE 25 MG: 25 TABLET ORAL at 09:31

## 2019-01-01 RX ADMIN — Medication 10 ML: at 08:13

## 2019-01-01 RX ADMIN — ACETAMINOPHEN 650 MG: 325 TABLET ORAL at 05:42

## 2019-01-01 RX ADMIN — METHYLPREDNISOLONE SODIUM SUCCINATE 40 MG: 40 INJECTION, POWDER, FOR SOLUTION INTRAMUSCULAR; INTRAVENOUS at 20:33

## 2019-01-01 RX ADMIN — DEXMEDETOMIDINE 0.8 MCG/KG/HR: 100 INJECTION, SOLUTION, CONCENTRATE INTRAVENOUS at 22:43

## 2019-01-01 RX ADMIN — INSULIN LISPRO 2 UNITS: 100 INJECTION, SOLUTION INTRAVENOUS; SUBCUTANEOUS at 11:59

## 2019-01-01 RX ADMIN — METHYLPREDNISOLONE SODIUM SUCCINATE 40 MG: 40 INJECTION, POWDER, FOR SOLUTION INTRAMUSCULAR; INTRAVENOUS at 15:54

## 2019-01-01 RX ADMIN — QUETIAPINE FUMARATE 25 MG: 25 TABLET ORAL at 20:45

## 2019-01-01 RX ADMIN — LORAZEPAM 1 MG: 2 INJECTION INTRAMUSCULAR; INTRAVENOUS at 15:54

## 2019-01-01 RX ADMIN — IPRATROPIUM BROMIDE AND ALBUTEROL SULFATE 1 AMPULE: .5; 3 SOLUTION RESPIRATORY (INHALATION) at 04:15

## 2019-01-01 RX ADMIN — METOPROLOL TARTRATE 25 MG: 25 TABLET ORAL at 09:33

## 2019-01-01 RX ADMIN — PREDNISONE 40 MG: 20 TABLET ORAL at 09:33

## 2019-01-01 RX ADMIN — PREDNISONE 40 MG: 20 TABLET ORAL at 09:28

## 2019-01-01 RX ADMIN — Medication 100 MCG/HR: at 05:55

## 2019-01-01 RX ADMIN — INSULIN LISPRO 2 UNITS: 100 INJECTION, SOLUTION INTRAVENOUS; SUBCUTANEOUS at 00:35

## 2019-01-01 RX ADMIN — INSULIN LISPRO 4 UNITS: 100 INJECTION, SOLUTION INTRAVENOUS; SUBCUTANEOUS at 06:44

## 2019-01-01 RX ADMIN — BUDESONIDE 250 MCG: 0.25 SUSPENSION RESPIRATORY (INHALATION) at 20:18

## 2019-01-01 RX ADMIN — ACETAMINOPHEN 650 MG: 325 TABLET ORAL at 14:32

## 2019-01-01 RX ADMIN — METHYLPREDNISOLONE SODIUM SUCCINATE 40 MG: 40 INJECTION, POWDER, FOR SOLUTION INTRAMUSCULAR; INTRAVENOUS at 10:57

## 2019-01-01 RX ADMIN — FORMOTEROL FUMARATE DIHYDRATE 20 MCG: 20 SOLUTION RESPIRATORY (INHALATION) at 20:44

## 2019-01-01 RX ADMIN — HYDRALAZINE HYDROCHLORIDE 10 MG: 20 INJECTION INTRAMUSCULAR; INTRAVENOUS at 09:33

## 2019-01-01 RX ADMIN — CHLORHEXIDINE GLUCONATE 0.12% ORAL RINSE 15 ML: 1.2 LIQUID ORAL at 10:58

## 2019-01-01 RX ADMIN — DEXMEDETOMIDINE 0.4 MCG/KG/HR: 100 INJECTION, SOLUTION, CONCENTRATE INTRAVENOUS at 02:38

## 2019-01-01 RX ADMIN — Medication 10 ML: at 08:01

## 2019-01-01 RX ADMIN — PANTOPRAZOLE SODIUM 40 MG: 40 INJECTION, POWDER, FOR SOLUTION INTRAVENOUS at 07:37

## 2019-01-01 RX ADMIN — LORAZEPAM 1 MG: 2 INJECTION INTRAMUSCULAR; INTRAVENOUS at 00:14

## 2019-01-01 RX ADMIN — POTASSIUM CHLORIDE 40 MEQ: 29.8 INJECTION, SOLUTION INTRAVENOUS at 08:22

## 2019-01-01 RX ADMIN — LORAZEPAM 1 MG: 2 INJECTION INTRAMUSCULAR; INTRAVENOUS at 20:04

## 2019-01-01 RX ADMIN — METOPROLOL TARTRATE 25 MG: 25 TABLET ORAL at 20:48

## 2019-01-01 RX ADMIN — LORAZEPAM 1 MG: 2 INJECTION INTRAMUSCULAR; INTRAVENOUS at 20:48

## 2019-01-01 RX ADMIN — CHLORHEXIDINE GLUCONATE 0.12% ORAL RINSE 15 ML: 1.2 LIQUID ORAL at 20:33

## 2019-01-01 RX ADMIN — DEXMEDETOMIDINE 0.4 MCG/KG/HR: 100 INJECTION, SOLUTION, CONCENTRATE INTRAVENOUS at 14:21

## 2019-01-01 RX ADMIN — CHLORHEXIDINE GLUCONATE 0.12% ORAL RINSE 15 ML: 1.2 LIQUID ORAL at 07:40

## 2019-01-01 RX ADMIN — ENOXAPARIN SODIUM 40 MG: 40 INJECTION SUBCUTANEOUS at 09:10

## 2019-01-01 RX ADMIN — METHYLPREDNISOLONE SODIUM SUCCINATE 40 MG: 40 INJECTION, POWDER, FOR SOLUTION INTRAMUSCULAR; INTRAVENOUS at 04:27

## 2019-01-01 RX ADMIN — ACETAMINOPHEN 650 MG: 325 TABLET ORAL at 10:53

## 2019-01-01 RX ADMIN — CHLORHEXIDINE GLUCONATE 0.12% ORAL RINSE 15 ML: 1.2 LIQUID ORAL at 09:31

## 2019-01-01 RX ADMIN — Medication 10 ML: at 20:48

## 2019-01-01 RX ADMIN — PANTOPRAZOLE SODIUM 40 MG: 40 INJECTION, POWDER, FOR SOLUTION INTRAVENOUS at 08:44

## 2019-01-01 RX ADMIN — METHYLPREDNISOLONE SODIUM SUCCINATE 40 MG: 40 INJECTION, POWDER, FOR SOLUTION INTRAMUSCULAR; INTRAVENOUS at 02:28

## 2019-01-01 RX ADMIN — IPRATROPIUM BROMIDE AND ALBUTEROL SULFATE 1 AMPULE: .5; 3 SOLUTION RESPIRATORY (INHALATION) at 15:52

## 2019-01-01 RX ADMIN — INSULIN LISPRO 2 UNITS: 100 INJECTION, SOLUTION INTRAVENOUS; SUBCUTANEOUS at 17:00

## 2019-01-01 RX ADMIN — CHLORHEXIDINE GLUCONATE 0.12% ORAL RINSE 15 ML: 1.2 LIQUID ORAL at 20:11

## 2019-01-01 RX ADMIN — FORMOTEROL FUMARATE DIHYDRATE 20 MCG: 20 SOLUTION RESPIRATORY (INHALATION) at 07:29

## 2019-01-01 RX ADMIN — ENOXAPARIN SODIUM 40 MG: 40 INJECTION SUBCUTANEOUS at 09:27

## 2019-01-01 RX ADMIN — ACETAMINOPHEN 650 MG: 325 TABLET ORAL at 20:09

## 2019-01-01 RX ADMIN — Medication 10 ML: at 09:33

## 2019-01-01 RX ADMIN — ACETAMINOPHEN 650 MG: 325 TABLET ORAL at 19:35

## 2019-01-01 RX ADMIN — METHYLPREDNISOLONE SODIUM SUCCINATE 40 MG: 40 INJECTION, POWDER, FOR SOLUTION INTRAMUSCULAR; INTRAVENOUS at 02:04

## 2019-01-01 RX ADMIN — BUDESONIDE 250 MCG: 0.25 SUSPENSION RESPIRATORY (INHALATION) at 20:17

## 2019-01-01 RX ADMIN — IPRATROPIUM BROMIDE AND ALBUTEROL SULFATE 1 AMPULE: .5; 3 SOLUTION RESPIRATORY (INHALATION) at 19:37

## 2019-01-01 RX ADMIN — IPRATROPIUM BROMIDE AND ALBUTEROL SULFATE 1 AMPULE: .5; 3 SOLUTION RESPIRATORY (INHALATION) at 19:22

## 2019-01-01 RX ADMIN — IPRATROPIUM BROMIDE AND ALBUTEROL SULFATE 1 AMPULE: .5; 3 SOLUTION RESPIRATORY (INHALATION) at 21:15

## 2019-01-01 RX ADMIN — METOPROLOL TARTRATE 25 MG: 25 TABLET ORAL at 20:46

## 2019-01-01 RX ADMIN — PROPOFOL 20 MCG/KG/MIN: 10 INJECTION, EMULSION INTRAVENOUS at 23:25

## 2019-01-01 RX ADMIN — ACETAMINOPHEN 650 MG: 325 TABLET ORAL at 20:16

## 2019-01-01 RX ADMIN — METHYLPREDNISOLONE SODIUM SUCCINATE 40 MG: 40 INJECTION, POWDER, FOR SOLUTION INTRAMUSCULAR; INTRAVENOUS at 09:31

## 2019-01-01 RX ADMIN — HYDRALAZINE HYDROCHLORIDE 10 MG: 20 INJECTION INTRAMUSCULAR; INTRAVENOUS at 06:45

## 2019-01-01 RX ADMIN — METOPROLOL TARTRATE 25 MG: 25 TABLET ORAL at 08:14

## 2019-01-01 RX ADMIN — BUDESONIDE 250 MCG: 0.25 SUSPENSION RESPIRATORY (INHALATION) at 07:28

## 2019-01-01 RX ADMIN — PANTOPRAZOLE SODIUM 40 MG: 40 INJECTION, POWDER, FOR SOLUTION INTRAVENOUS at 09:31

## 2019-01-01 RX ADMIN — QUETIAPINE FUMARATE 25 MG: 25 TABLET ORAL at 20:10

## 2019-01-01 RX ADMIN — ACETAMINOPHEN 650 MG: 325 TABLET ORAL at 22:17

## 2019-01-01 RX ADMIN — FORMOTEROL FUMARATE DIHYDRATE 20 MCG: 20 SOLUTION RESPIRATORY (INHALATION) at 20:41

## 2019-01-01 RX ADMIN — METOPROLOL TARTRATE 25 MG: 25 TABLET ORAL at 20:33

## 2019-01-01 RX ADMIN — ACETAMINOPHEN 650 MG: 325 TABLET ORAL at 05:13

## 2019-01-01 RX ADMIN — POTASSIUM CHLORIDE 20 MEQ: 400 INJECTION, SOLUTION INTRAVENOUS at 10:57

## 2019-01-01 RX ADMIN — DEXMEDETOMIDINE 0.2 MCG/KG/HR: 100 INJECTION, SOLUTION, CONCENTRATE INTRAVENOUS at 17:20

## 2019-01-01 RX ADMIN — INSULIN LISPRO 2 UNITS: 100 INJECTION, SOLUTION INTRAVENOUS; SUBCUTANEOUS at 05:45

## 2019-01-01 RX ADMIN — METHYLPREDNISOLONE SODIUM SUCCINATE 40 MG: 40 INJECTION, POWDER, FOR SOLUTION INTRAMUSCULAR; INTRAVENOUS at 21:23

## 2019-01-01 RX ADMIN — CEFTRIAXONE 1 G: 1 INJECTION, POWDER, FOR SOLUTION INTRAMUSCULAR; INTRAVENOUS at 04:30

## 2019-01-01 RX ADMIN — INSULIN LISPRO 2 UNITS: 100 INJECTION, SOLUTION INTRAVENOUS; SUBCUTANEOUS at 12:13

## 2019-01-01 RX ADMIN — LORAZEPAM 1 MG: 2 INJECTION INTRAMUSCULAR; INTRAVENOUS at 01:03

## 2019-01-01 RX ADMIN — Medication 75 MCG/HR: at 00:25

## 2019-01-01 RX ADMIN — IPRATROPIUM BROMIDE AND ALBUTEROL SULFATE 1 AMPULE: .5; 3 SOLUTION RESPIRATORY (INHALATION) at 11:24

## 2019-01-01 RX ADMIN — METHYLPREDNISOLONE SODIUM SUCCINATE 40 MG: 40 INJECTION, POWDER, FOR SOLUTION INTRAMUSCULAR; INTRAVENOUS at 09:01

## 2019-01-01 RX ADMIN — BUDESONIDE 250 MCG: 0.25 SUSPENSION RESPIRATORY (INHALATION) at 07:32

## 2019-01-01 RX ADMIN — Medication 10 ML: at 09:16

## 2019-01-01 RX ADMIN — INSULIN LISPRO 2 UNITS: 100 INJECTION, SOLUTION INTRAVENOUS; SUBCUTANEOUS at 17:48

## 2019-01-01 RX ADMIN — AZITHROMYCIN MONOHYDRATE 500 MG: 500 INJECTION, POWDER, LYOPHILIZED, FOR SOLUTION INTRAVENOUS at 10:33

## 2019-01-01 RX ADMIN — IPRATROPIUM BROMIDE AND ALBUTEROL SULFATE 1 AMPULE: .5; 3 SOLUTION RESPIRATORY (INHALATION) at 10:57

## 2019-01-01 ASSESSMENT — PAIN DESCRIPTION - ORIENTATION
ORIENTATION: LOWER

## 2019-01-01 ASSESSMENT — PAIN DESCRIPTION - PAIN TYPE
TYPE: CHRONIC PAIN
TYPE: ACUTE PAIN
TYPE: CHRONIC PAIN
TYPE: ACUTE PAIN
TYPE: CHRONIC PAIN
TYPE: CHRONIC PAIN

## 2019-01-01 ASSESSMENT — PULMONARY FUNCTION TESTS
PIF_VALUE: 15
PIF_VALUE: 25
PIF_VALUE: 30
PIF_VALUE: 24
PIF_VALUE: 25
PIF_VALUE: 26
PIF_VALUE: 20
PIF_VALUE: 25
PIF_VALUE: 24
PIF_VALUE: 29
PIF_VALUE: 24
PIF_VALUE: 23
PIF_VALUE: 20
PIF_VALUE: 23
PIF_VALUE: 24
PIF_VALUE: 18
PIF_VALUE: 15
PIF_VALUE: 22
PIF_VALUE: 24
PIF_VALUE: 25
PIF_VALUE: 28
PIF_VALUE: 24
PIF_VALUE: 41
PIF_VALUE: 24
PIF_VALUE: 33
PIF_VALUE: 45
PIF_VALUE: 24
PIF_VALUE: 35
PIF_VALUE: 23
PIF_VALUE: 14
PIF_VALUE: 25
PIF_VALUE: 24
PIF_VALUE: 51
PIF_VALUE: 24
PIF_VALUE: 14
PIF_VALUE: 14
PIF_VALUE: 24
PIF_VALUE: 27
PIF_VALUE: 24
PIF_VALUE: 32
PIF_VALUE: 15
PIF_VALUE: 25
PIF_VALUE: 31
PIF_VALUE: 28
PIF_VALUE: 16
PIF_VALUE: 19
PIF_VALUE: 25
PIF_VALUE: 14
PIF_VALUE: 26
PIF_VALUE: 25
PIF_VALUE: 29
PIF_VALUE: 33
PIF_VALUE: 37
PIF_VALUE: 25
PIF_VALUE: 11
PIF_VALUE: 22
PIF_VALUE: 27
PIF_VALUE: 31
PIF_VALUE: 45
PIF_VALUE: 24
PIF_VALUE: 31
PIF_VALUE: 38
PIF_VALUE: 25
PIF_VALUE: 27
PIF_VALUE: 25
PIF_VALUE: 28
PIF_VALUE: 24
PIF_VALUE: 32
PIF_VALUE: 26
PIF_VALUE: 24
PIF_VALUE: 32
PIF_VALUE: 14
PIF_VALUE: 25
PIF_VALUE: 22
PIF_VALUE: 35
PIF_VALUE: 25
PIF_VALUE: 21
PIF_VALUE: 33
PIF_VALUE: 24
PIF_VALUE: 28
PIF_VALUE: 18
PIF_VALUE: 23
PIF_VALUE: 12
PIF_VALUE: 14
PIF_VALUE: 25
PIF_VALUE: 11
PIF_VALUE: 29
PIF_VALUE: 26
PIF_VALUE: 19
PIF_VALUE: 24
PIF_VALUE: 20
PIF_VALUE: 13
PIF_VALUE: 22
PIF_VALUE: 25
PIF_VALUE: 25
PIF_VALUE: 45
PIF_VALUE: 17
PIF_VALUE: 27
PIF_VALUE: 30
PIF_VALUE: 31
PIF_VALUE: 12
PIF_VALUE: 28
PIF_VALUE: 23
PIF_VALUE: 31
PIF_VALUE: 23
PIF_VALUE: 24
PIF_VALUE: 25
PIF_VALUE: 28
PIF_VALUE: 30
PIF_VALUE: 24
PIF_VALUE: 35
PIF_VALUE: 24
PIF_VALUE: 24

## 2019-01-01 ASSESSMENT — PAIN DESCRIPTION - ONSET
ONSET: ON-GOING

## 2019-01-01 ASSESSMENT — PAIN SCALES - GENERAL
PAINLEVEL_OUTOF10: 0
PAINLEVEL_OUTOF10: 0
PAINLEVEL_OUTOF10: 2
PAINLEVEL_OUTOF10: 0
PAINLEVEL_OUTOF10: 9
PAINLEVEL_OUTOF10: 0
PAINLEVEL_OUTOF10: 8
PAINLEVEL_OUTOF10: 0
PAINLEVEL_OUTOF10: 8
PAINLEVEL_OUTOF10: 9
PAINLEVEL_OUTOF10: 0
PAINLEVEL_OUTOF10: 9
PAINLEVEL_OUTOF10: 0
PAINLEVEL_OUTOF10: 10
PAINLEVEL_OUTOF10: 0
PAINLEVEL_OUTOF10: 9
PAINLEVEL_OUTOF10: 0
PAINLEVEL_OUTOF10: 5
PAINLEVEL_OUTOF10: 0
PAINLEVEL_OUTOF10: 8
PAINLEVEL_OUTOF10: 0
PAINLEVEL_OUTOF10: 8
PAINLEVEL_OUTOF10: 0
PAINLEVEL_OUTOF10: 8
PAINLEVEL_OUTOF10: 0
PAINLEVEL_OUTOF10: 8
PAINLEVEL_OUTOF10: 0
PAINLEVEL_OUTOF10: 0
PAINLEVEL_OUTOF10: 9
PAINLEVEL_OUTOF10: 0
PAINLEVEL_OUTOF10: 6
PAINLEVEL_OUTOF10: 0
PAINLEVEL_OUTOF10: 0
PAINLEVEL_OUTOF10: 8
PAINLEVEL_OUTOF10: 0
PAINLEVEL_OUTOF10: 2
PAINLEVEL_OUTOF10: 0
PAINLEVEL_OUTOF10: 9
PAINLEVEL_OUTOF10: 0
PAINLEVEL_OUTOF10: 0
PAINLEVEL_OUTOF10: 9
PAINLEVEL_OUTOF10: 5
PAINLEVEL_OUTOF10: 0
PAINLEVEL_OUTOF10: 8
PAINLEVEL_OUTOF10: 10
PAINLEVEL_OUTOF10: 0
PAINLEVEL_OUTOF10: 9
PAINLEVEL_OUTOF10: 5
PAINLEVEL_OUTOF10: 9
PAINLEVEL_OUTOF10: 9
PAINLEVEL_OUTOF10: 0
PAINLEVEL_OUTOF10: 0
PAINLEVEL_OUTOF10: 3
PAINLEVEL_OUTOF10: 0
PAINLEVEL_OUTOF10: 6
PAINLEVEL_OUTOF10: 0
PAINLEVEL_OUTOF10: 10
PAINLEVEL_OUTOF10: 0
PAINLEVEL_OUTOF10: 0

## 2019-01-01 ASSESSMENT — ENCOUNTER SYMPTOMS
APNEA: 0
COUGH: 1
COUGH: 1
SHORTNESS OF BREATH: 1
WHEEZING: 1
BACK PAIN: 0
EYE ITCHING: 0
CHOKING: 0
NAUSEA: 0
ABDOMINAL PAIN: 0
VOMITING: 0
SHORTNESS OF BREATH: 1
EYE DISCHARGE: 0
DIARRHEA: 1
CHEST TIGHTNESS: 0
GASTROINTESTINAL NEGATIVE: 1

## 2019-01-01 ASSESSMENT — PAIN DESCRIPTION - FREQUENCY
FREQUENCY: CONTINUOUS
FREQUENCY: INTERMITTENT
FREQUENCY: CONTINUOUS
FREQUENCY: CONTINUOUS

## 2019-01-01 ASSESSMENT — PAIN DESCRIPTION - LOCATION
LOCATION: BACK
LOCATION: HEAD
LOCATION: BACK
LOCATION: HEAD
LOCATION: BACK

## 2019-01-01 ASSESSMENT — PAIN DESCRIPTION - DESCRIPTORS
DESCRIPTORS: ACHING;DISCOMFORT;DULL
DESCRIPTORS: DISCOMFORT
DESCRIPTORS: ACHING
DESCRIPTORS: DISCOMFORT;DULL
DESCRIPTORS: ACHING;CONSTANT;DISCOMFORT
DESCRIPTORS: HEADACHE;ACHING;DISCOMFORT
DESCRIPTORS: ACHING;DISCOMFORT
DESCRIPTORS: ACHING;CONSTANT
DESCRIPTORS: ACHING;CONSTANT;DISCOMFORT
DESCRIPTORS: ACHING
DESCRIPTORS: ACHING;DISCOMFORT
DESCRIPTORS: ACHING;DISCOMFORT;CONSTANT
DESCRIPTORS: HEADACHE
DESCRIPTORS: ACHING;DISCOMFORT
DESCRIPTORS: ACHING;DISCOMFORT

## 2019-01-01 ASSESSMENT — PATIENT HEALTH QUESTIONNAIRE - PHQ9
SUM OF ALL RESPONSES TO PHQ QUESTIONS 1-9: 0
1. LITTLE INTEREST OR PLEASURE IN DOING THINGS: 0
SUM OF ALL RESPONSES TO PHQ QUESTIONS 1-9: 0
SUM OF ALL RESPONSES TO PHQ9 QUESTIONS 1 & 2: 0
2. FEELING DOWN, DEPRESSED OR HOPELESS: 0

## 2019-01-01 ASSESSMENT — PAIN DESCRIPTION - PROGRESSION
CLINICAL_PROGRESSION: NOT CHANGED
CLINICAL_PROGRESSION: GRADUALLY IMPROVING
CLINICAL_PROGRESSION: NOT CHANGED

## 2019-01-01 ASSESSMENT — PAIN - FUNCTIONAL ASSESSMENT

## 2019-01-01 ASSESSMENT — PAIN DESCRIPTION - DIRECTION: RADIATING_TOWARDS: ACROSS BACK

## 2019-02-06 PROBLEM — J96.92 HYPERCAPNIC RESPIRATORY FAILURE (HCC): Status: ACTIVE | Noted: 2019-01-01

## 2019-02-06 NOTE — PLAN OF CARE
Problem: Risk for Impaired Skin Integrity  Goal: Tissue integrity - skin and mucous membranes  Structural intactness and normal physiological function of skin and  mucous membranes.    Outcome: Met This Shift      Problem: Falls - Risk of:  Goal: Will remain free from falls  Will remain free from falls   Outcome: Met This Shift    Goal: Absence of physical injury  Absence of physical injury   Outcome: Met This Shift      Problem: Restraint Use - Nonviolent/Non-Self-Destructive Behavior:  Goal: Absence of restraint indications  Absence of restraint indications   Outcome: Not Met This Shift  Pulls at lines/tube when not restrained   Goal: Absence of restraint-related injury  Absence of restraint-related injury   Outcome: Met This Shift

## 2019-02-06 NOTE — PATIENT CARE CONFERENCE
Intensive Care Daily Quality Rounding Checklist       ICU Team Members: Dr. Gustavo Medrano, Elizabeth Wahl & Bob, clinical pharmacist, respiratory therapist, charge nurse, bedside nurse     ICU Day #: NUMBER: 1     Intubation Date: February 6     Ventilator Day #: NUMBER: 1     Central Line Insertion Date: February 6                                                    Day #: NUMBER: 1      Arterial Line Insertion Date:                              Day #:      DVT Prophylaxis: Lovenox    GI Prophylaxis: Protonix     Skin Issues/ Wounds and ordered treatment discussed on rounds: no acute issues     Goals/ Plans for the Day: Daily labs, adjust vent as needed, pan culture

## 2019-02-06 NOTE — ED PROVIDER NOTES
The patient is a 55-year-old female, past medical history COPD, tobacco use, hypertension, who presents via family for shortness of breath. Patient's  states for the past 2 days the patient is becoming progressively more short of breath. She had several episodes of diarrhea 3 days ago and has been dealing with a cough. He has been treating her with over-the-counter cough syrup without relief. Today he noted that she was having \"rapid\" and \"shallow\" breathing is becoming more lethargic and he also noted that she seemed to be turning \"blue\". The patient's son notes that she has swelling of her ankles which is new for her. The history is provided by the spouse and the patient. The history is limited by the condition of the patient. Shortness of Breath   Severity:  Severe  Onset quality:  Gradual  Timing:  Constant  Progression:  Worsening  Chronicity:  New  Context: URI    Relieved by:  Nothing  Worsened by:  Nothing  Ineffective treatments:  None tried  Associated symptoms: cough    Associated symptoms: no abdominal pain, no fever and no vomiting    Risk factors: no hx of cancer, no hx of PE/DVT and no tobacco use        Review of Systems   Unable to perform ROS: Acuity of condition   Constitutional: Negative for chills and fever. Respiratory: Positive for cough and shortness of breath. Gastrointestinal: Positive for diarrhea. Negative for abdominal pain, nausea and vomiting. Physical Exam   Constitutional: She is oriented to person, place, and time. She appears distressed. White female, distressed, tachypneic with blank stare - follows some commands and answers some questions   HENT:   Head: Normocephalic and atraumatic. Mouth/Throat: Oropharynx is clear and moist. No oropharyngeal exudate. Eyes: Pupils are equal, round, and reactive to light. Conjunctivae and EOM are normal. Right eye exhibits no discharge. Left eye exhibits no discharge. No scleral icterus.    Neck: Normal range of motion. Neck supple. No JVD present. No tracheal deviation present. No thyromegaly present. Cardiovascular: Regular rhythm, normal heart sounds and intact distal pulses. Exam reveals no gallop and no friction rub. No murmur heard. Tachycardic   Pulmonary/Chest: Accessory muscle usage present. No stridor. Tachypnea noted. She is in respiratory distress. She has no wheezes. She has no rhonchi. She has no rales. She exhibits no tenderness. Coarse rhonchi MARY   Abdominal: Soft. Bowel sounds are normal. She exhibits no distension and no mass. There is no tenderness. There is no rebound and no guarding. Musculoskeletal: Normal range of motion. She exhibits edema (MARY 2+ LE). Lymphadenopathy:     She has no cervical adenopathy. Neurological: She is alert and oriented to person, place, and time. Skin: Skin is warm and dry. Capillary refill takes less than 2 seconds. No rash noted. She is not diaphoretic. No pallor. Nursing note and vitals reviewed. Procedures  Intubation Procedure Note    Indication: Respiratory failure    Consent: Unable to be obtained due to the emergent nature of this procedure. Medications Used: etomidate intravenously and succinycholine intravenously    Procedure: The patient was placed in the appropriate position. Cricoid pressure was not required. Intubation was performed by indirect laryngoscopy using a Glidescope and a 7.5 cuffed endotracheal tube. The cuff was then inflated and the tube was secured appropriately at a distance of 23 cm to the dental ridge. Initial confirmation of placement included bilateral breath sounds, an end tidal CO2 detector, absence of sounds over the stomach, tube fogging, adequate chest rise and adequate pulse oximetry reading. A chest x-ray to verify correct placement of the tube showed appropriate tube position. The patient tolerated the procedure well.      Complications: None      MDM    ED Course as of Feb 06 0752 Wed Feb 06, 2019 6657 Discussed with Dr. Dudley Smith, ICU. He will accept patient to ICU. [JL]   [id number] Patient continues to be agitated and fighting the tube. The pressure is not tolerating propofol and fentanyl. Will start pressors and place a central line. Discussed with the ICU resident, he will place a central line. [JL]      ED Course User Index  [JL] Peggyann Night, DO       Patient presented in respiratory distress and was ill appearing. She was placed on oxygen and appeared very lethargic and we were concerned the patient was quickly fatiguing from her heavy breathing. Decided to intubate. ABG showed hypercapnic acidosis - likely contributing to mental status. Signs of pneumonia on CXR - empiric coverage started. Fluids given with improvement in BP; however, BP did not tolerate propofol needed for sedation. Pressors started. Patient admitted to ICU.  present and updated throughout. He is agreeable for admission, intubation, central line. SEPSIS EVALUATION TOOL Time of presentation: 2/6/2019  3:25 AM    SIRS CRITERIA: (2 required)  Temperature <36 or >38  No  Heart rate >90    Yes  RR >20 or PCO2 <32   Yes  WBC >12 or <4 or >10% bands Yes    SEPSIS CRITERIA: (Both required)  Two or more of the above  Yes  Suspected source(s) of infection pna    ANTIBIOTIC SELECTION RATIONAL  [id number] Antibiogram    ANTIBIOTIC SELECTION LIMITATIONS  None    LACTIC ACID    Initial     6.7  Follow up - if initial abnormal (>2)     SEVERE SEPSIS CRITERIA: (All 3 of the following criteria needed)  1. SIRS Criteria met   Yes  2. Sepsis Criteria met   Yes  3. Organ dysfunction as evidenced by any one of the following Yes   A. Systolic MT<03 or MAP< 65 or SBP decrease by >40 from baseline   B. Creatinine >2.0, or urine output <0.5 mL/kg/hr for 2 hours   C. Bilirubin > 2 mg/dL   D. Platelet count < 201,768   E. INR > 1.5 or aPTT > 60 sec   F. Lactate > 2 mmol/L    Section requirements:   To be done within 3 hours of presentation (0325 + 3 challenge    --------------------------------------------- PAST HISTORY ---------------------------------------------  Past Medical History:  has a past medical history of Chronic back pain; COPD (chronic obstructive pulmonary disease) ([facility] [address] Headache; Hypertension; and Osteoarthritis. Past Surgical History:  has a past surgical history that includes Hysterectomy and  section. Social History:  reports that she has been smoking Cigarettes. She has a 54.00 pack-year smoking history. She has never used smokeless tobacco. She reports that she does not drink alcohol or use drugs. Family History: family history is not on file. The patients home medications have been reviewed.     Allergies: Sulfa antibiotics    -------------------------------------------------- RESULTS -------------------------------------------------    Lab  Results for orders placed or performed during the hospital encounter of 19   Comprehensive Metabolic Panel   Result Value Ref Range    Sodium 141 132 - 146 mmol/L    Potassium 3.1 (L) 3.5 - 5.0 mmol/L    Chloride 98 98 - 107 mmol/L    CO2 21 (L) 22 - 29 mmol/L    Anion Gap 22 (H) 7 - 16 mmol/L    Glucose 220 (H) 74 - 99 mg/dL    BUN 41 (H) 8 - 23 mg/dL    CREATININE 0.9 0.5 - 1.0 mg/dL    GFR Non-African American >60 >=60 mL/min/1.73    GFR African American >60     Calcium 9.2 8.6 - 10.2 mg/dL    Total Protein 7.9 6.4 - 8.3 g/dL    Alb 3.4 (L) 3.5 - 5.2 g/dL    Total Bilirubin 0.2 0.0 - 1.2 mg/dL    Alkaline Phosphatase 145 (H) 35 - 104 U/L    ALT 8 0 - 32 U/L    AST 15 0 - 31 U/L   CBC Auto Differential   Result Value Ref Range    WBC 23.2 (H) 4.5 - 11.5 E9/L    RBC 5.00 3.50 - 5.50 E12/L    Hemoglobin 15.2 11.5 - 15.5 g/dL    Hematocrit 50.6 (H) 34.0 - 48.0 %    .2 (H) 80.0 - 99.9 fL    MCH 30.4 26.0 - 35.0 pg    MCHC 30.0 (L) 32.0 - 34.5 %    RDW 15.9 (H) 11.5 - 15.0 fL    Platelets 998 235 - 151 E9/L    MPV 11.0 7.0 - 12.0 fL    Neutrophils % 81.7 (H) 43.0 - 80.0 %    Lymphocytes % 11.3 (L) 20.0 - 42.0 %    Monocytes % 6.1 2.0 - 12.0 %    Eosinophils % 0.0 0.0 - 6.0 %    Basophils % 0.0 0.0 - 2.0 %    Neutrophils # 19.26 (H) 1.80 - 7.30 E9/L    Lymphocytes # 2.55 1.50 - 4.00 E9/L    Monocytes # 1.39 (H) 0.10 - 0.95 E9/L    Eosinophils # 0.00 (L) 0.05 - 0.50 E9/L    Basophils # 0.00 0.00 - 0.20 E9/L    Metamyelocytes Relative 0.9 0.0 - 1.0 %    nRBC 0.0 /100 WBC    Toxic Granulation 1+     Dohle Bodies 1+     Vacuolated Neutrophils 1+     Anisocytosis 2+     Polychromasia 1+     Poikilocytes 1+     Melvin Cells 1+     Spherocytes 2+    APTT   Result Value Ref Range    aPTT 31.7 24.5 - 35.1 sec   Protime-INR   Result Value Ref Range    Protime 14.5 (H) 9.3 - 12.4 sec    INR 1.3    Troponin   Result Value Ref Range    Troponin <0.01 0.00 - 0.03 ng/mL   Lactic Acid, Plasma   Result Value Ref Range    Lactic Acid 6.7 (HH) 0.5 - 2.2 mmol/L   Urinalysis   Result Value Ref Range    Color, UA Yellow Straw/Yellow    Clarity, UA Clear Clear    Glucose, Ur Negative Negative mg/dL    Bilirubin Urine Negative Negative    Ketones, Urine Negative Negative mg/dL    Specific Gravity, UA 1.025 1.005 - 1.030    Blood, Urine MODERATE (A) Negative    pH, UA 5.5 5.0 - 9.0    Protein, UA >=300 (A) Negative mg/dL    Urobilinogen, Urine 0.2 <2.0 E.U./dL    Nitrite, Urine Negative Negative    Leukocyte Esterase, Urine Negative Negative   Blood Gas, Arterial   Result Value Ref Range    Date Analyzed [id number]     Time Analyzed 0346     Source: Blood Arterial     pH, Blood Gas 7.030 (LL) 7.350 - 7.450    PCO2 79.3 (HH) 35.0 - 45.0 mmHg    PO2 80.3 60.0 - 100.0 mmHg    HCO3 20.5 (L) 22.0 - 26.0 mmol/L    B.E. -11.9 (L) -3.0 - 3.0 mmol/L    O2 Sat 91.4 (L) 92.0 - 98.5 %    O2Hb 86.5 (L) 94.0 - 97.0 %    COHb 5.1 (H) 0.0 - 1.5 %    MetHb 0.3 0.0 - 1.5 %    O2 Content 18.2 mL/dL    HHb 8.1 (H) 0.0 - 5.0 %    tHb (est) 14.9 11.5 - 16.5 g/dL    Mode NC- 5 L     Date Of Collection      Time Collected      Pt Temp 37.0 C     ID [id number]     Lab [id number]     Critical(s) Notified Handed report to /EDSON    Brain Natriuretic Peptide   Result Value Ref Range    Pro-BNP 4,034 (H) 0 - 125 pg/mL   Procalcitonin   Result Value Ref Range    Procalcitonin 0.47 (H) 0.00 - 0.08 ng/mL   Microscopic Urinalysis   Result Value Ref Range    Casts FEW /LPF    WBC, UA 5-10 0 - 5 /HPF    RBC, UA 2-5 0 - 2 /HPF    Epi Cells RARE /HPF    Bacteria, UA RARE (A) /HPF   Blood Gas, Arterial   Result Value Ref Range    Date Analyzed [id number]     Time Analyzed 0614     Source: Blood Arterial     pH, Blood Gas 7.077 (LL) 7.350 - 7.450    PCO2 69.5 (HH) 35.0 - 45.0 mmHg    PO2 176.7 (H) 60.0 - 100.0 mmHg    HCO3 20.0 (L) 22.0 - 26.0 mmol/L    B.E. -10.7 (L) -3.0 - 3.0 mmol/L    O2 Sat 98.2 92.0 - 98.5 %    PO2/FIO2 2.94 mmHg/%    AaDO2 159.7 mmHg    RI(T) 90 %    O2Hb 95.1 94.0 - 97.0 %    COHb 2.9 (H) 0.0 - 1.5 %    MetHb 0.3 0.0 - 1.5 %    O2 Content 16.7 mL/dL    HHb 1.7 0.0 - 5.0 %    tHb (est) 12.2 11.5 - 16.5 g/dL    Mode AC     FIO2 60.0 %    Rr Mechanical 18.0 b/min    Vt Mechanical 400.0 mL    Peep/Cpap 5.0 cmH2O    Comment with readback     Date Of Collection      Time Collected      Pt Temp 37.0 C     ID [id number]     Lab [id number]     Critical(s) Notified Called to c. pogact rn    POCT Glucose   Result Value Ref Range    Meter Glucose 225 (H) 74 - 99 mg/dL       Radiology  XR CHEST PORTABLE   Final Result   Hazy opacities in the lower hemithoraces suggest developing bilateral basilar    pneumonia. This report has been electronically signed by Kayleen Kinsey MD.      XR CHEST PORTABLE    (Results Pending)         ------------------------- NURSING NOTES AND VITALS REVIEWED ---------------------------  Date / Time Roomed:  2/6/2019  3:25 AM  ED Bed Assignment:  20/20    The nursing notes within the ED encounter and vital signs as below have been reviewed.    Patient Vitals for the past 24 hrs:   BP Temp Temp src Pulse Resp SpO2 Height Weight   02/06/19 0732 136/78 - - 105 - 100 % - -   02/06/19 0718 100/60 - - 95 - 100 % - -   02/06/19 0713 (!) 90/52 - - 96 - 100 % - -   02/06/19 0704 (!) 97/56 - - 112 - 100 % - -   02/06/19 0622 (!) 72/53 - - 103 - 100 % - -   02/06/19 0612 (!) 76/53 - - - - - - -   02/06/19 0546 (!) 89/58 - - 110 - 100 % - -   02/06/19 0457 100/61 - - 103 - 100 % - -   02/06/19 0431 132/62 97.2 °F (36.2 °C) CORE 102 - 100 % - -   02/06/19 0424 (!) 86/50 97.5 °F (36.4 °C) CORE 97 - 98 % - -   02/06/19 0409 94/63 - - 97 - 100 % - -   02/06/19 0403 81/73 - - 96 - 100 % - -   02/06/19 0402 (!) 66/47 - - 97 - 97 % - -   02/06/19 0355 - - - 108 - 98 % - -   02/06/19 0346 - - - - - - 5' (1.524 m) 121 lb (54.9 kg)   02/06/19 0325 102/65 95.2 °F (35.1 °C) - 118 15 (!) 80 % - -       Oxygen Saturation Interpretation: Abnormal and Improved after treatment      ------------------------------------------ PROGRESS NOTES ------------------------------------------    I have spoken with the patient and  and discussed todays results, in addition to providing specific details for the plan of care and counseling regarding the diagnosis and prognosis. Their questions are answered at this time and they are agreeable with the plan.      --------------------------------- ADDITIONAL PROVIDER NOTES ---------------------------------  Consultations:  Spoke with Dr. Ivania Tavarez, IM,  They will admit this patient. Spoke with Dr. Arland Jeans, ICU. They will accept patient to ICU. This patient's ED course included: a personal history and physicial examination, re-evaluation prior to disposition, multiple bedside re-evaluations, IV medications, cardiac monitoring, continuous pulse oximetry and complex medical decision making and emergency management    This patient has been closely monitored and initially deteriorated, but then stabilized during their ED course.     Please note that the withdrawal or failure to initiate urgent interventions for this patient would likely result in a life threatening deterioration or permanent disability. Accordingly this patient received 60 minutes of critical care time, excluding separately billable procedures. Clinical Impression  1. Acute hypercapnic respiratory failure ([facility] [address]    2. COPD exacerbation ([facility] [address]    3. Pneumonia due to organism    4. Lactic acidosis    5. Hypokalemia    6. Sepsis, due to unspecified organism [facility])          Disposition  Patient's disposition: Admit to CCU/ICU  Patient's condition is stable.           Crissy White DO  Resident  02/06/19 8142

## 2019-02-06 NOTE — ED PROVIDER NOTES
Please note that the withdrawal or failure to initiate urgent interventions for this patient would likely result in a life threatening deterioration or permanent disability. Accordingly this patient received 30 minutes of critical care time, excluding separately billable procedures.

## 2019-02-06 NOTE — ED NOTES
4-Hour Sepsis Re-examination  2/6/19   8:42 AM          Vital Signs:   Vitals:    02/06/19 0732 02/06/19 0757 02/06/19 0809 02/06/19 0829   BP: 136/78 (!) 71/44 (!) 71/44 117/60   Pulse: 105 92 92 96   Resp:    22   Temp:   97.5 °F (36.4 °C)    TempSrc:   Core    SpO2: 100% 100% 100% 100%   Weight:       Height:         Card/Pulm:  Rhythm: regularly irregular. Heart Sounds: no murmurs, gallops, or rubs. clear to auscultation, no wheezes or rales and unlabored breathing. Capillary Refill: normal.  Radial Pulse:  present 1+. Skin:  Pale, Pink and Warm.        Evette Blevins DO  02/06/19 Sandeep

## 2019-02-06 NOTE — H&P
[facility]   History and Physical      CHIEF COMPLAINT:  SOB    History of Present Illness:  77 y.o. female with a history of COPD, Hypertension, OA, Chronic back pain, on Opiates at home, brought to ED with SOB, did have recent Cough, Diarrhea, was negative for Flu in ED. [place]eve Hypotensive, ABGs with Hypercapnic Respiratory Failure, required ET placement as well as TLC. presents with sob. At the time of my evaluation patient in ICU, on Vent, sedated, on pressors. No family members. Informant(s) for H&P:Chart as well as information available from staff    REVIEW OF SYSTEMS:  no fevers, chills, cp, sob, n/v, ha, vision/hearing changes, wt changes, hot/cold flashes, other open skin lesions, diarrhea, constipation, dysuria/hematuria unless noted in HPI. Complete ROS performed with the patient and is otherwise negative. PMH:  Past Medical History:   Diagnosis Date    Chronic back pain     COPD (chronic obstructive pulmonary disease) ([address]     Headache     Hypertension     Osteoarthritis        Surgical History:  Past Surgical History:   Procedure Laterality Date     SECTION      HYSTERECTOMY         Medications Prior to Admission:    Prior to Admission medications    Medication Sig Start Date End Date Taking? Authorizing Provider   amLODIPine-benazepril (LOTREL) 5-20 MG per capsule Take 1 capsule by mouth daily 18   Karel Lyons DO   cephALEXin [facility]) 500 MG capsule Take 1 capsule by mouth 2 times daily 18   Karel Lyons DO       Allergies:    Sulfa antibiotics    Social History:    reports that she has been smoking Cigarettes. She has a 54.00 pack-year smoking history. She has never used smokeless tobacco. She reports that she does not drink alcohol or use drugs. Family History:   No family history on file.     PHYSICAL EXAM:  Vitals:  BP (!) 105/56   Pulse 112   Temp 99.9 °F (37.7 °C) (Core)   Resp 27   Ht 5' (1.524 m)   [employer] 123 lb 0.3 oz (55.8 kg)   SpO2 96%   BMI 24.03 kg/m²   General Appearance: In no acute distress-sedated on vent  Skin: warm and dry, no rash or erythema  Head: normocephalic and atraumatic  Eyes: pupils equal, round, and reactive to light, extraocular eye movements intact, conjunctivae normal  ENT: tympanic membrane, external ear and ear canal normal bilaterally, oropharynx clear and moist with normal mucous membranes  Neck: neck supple and non tender without mass, no thyromegaly or thyroid nodules, no cervical lymphadenopathy   Pulmonary/Chest: clear to auscultation bilaterally- no wheezes, rales or rhonchi, normal air movement, no respiratory distress  Cardiovascular: normal rate, normal S1 and S2, no gallops, intact distal pulses and no carotid bruits  Abdomen: soft, non-tender, non-distended, normal bowel sounds, no masses or organomegaly      LABS:  Recent Labs      19   0338   NA  141   K  3.1*   CL  98   CO2  21*   BUN  41*   CREATININE  0.9   GLUCOSE  220*   CALCIUM  9.2       Recent Labs      19   0338   WBC  23.2*   RBC  5.00   HGB  15.2   HCT  50.6*   MCV  101.2*   MCH  30.4   MCHC  30.0*   RDW  15.9*   PLT  373   MPV  11.0       No results for input(s): POCGLU in the last 72 hours. Radiology: Xr Chest Portable    Result Date: 2019  Patient MRN:  [id number] : 1950 Age: 77 years Gender: Female Order Date:  2019 7:00 AM EXAM: XR CHEST PORTABLE one image INDICATION:  check placement check placement COMPARISON: 2019 FINDINGS: Heart size is in upper limits of normal. Endotracheal tube is appropriately 4 cm above the zina. Nasogastric tube is unchanged. Right IJ central line is in place with the tip in the superior vena cava without pneumothorax. There is vascular congestion with perihilar and bibasilar infiltrates and pleural effusions. There is underlying COPD. COPD with superimposed mild CHF. Right IJ central line without complications.      Xr Chest Portable    Result Date: 2/6/2019  Initial report created on 2/6/2019 6:16:03 AM EST EXAM:  XR Chest, 1 View EXAM DATE/TIME:  2/6/2019 3:45 AM CLINICAL HISTORY:  77years old, female; Device placement; Ett placement (vent status); Additional info: Chest pain. Ett and ng tube placement TECHNIQUE:  XR of the chest, 1 view. COMPARISON:  DX CHEST 1 VIEW PORTABLE 6/18/2015 6:41 PM FINDINGS:  Tubes, catheters and devices:  Endotracheal tube is placed with its tip approximately 2.2 cm from the zina. Nasogastric is present with its tip in the proximal stomach. Lungs:  Hazy and strandy opacities are seen in the lower hemithoraces bilaterally possibly representng developing bilateral basilar pneumonia. Pleural space: Unremarkable. No pleural effusion. No pneumothorax. Heart/Mediastinum: Unremarkable. No cardiomegaly. Bones/joints: Unremarkable. Hazy opacities in the lower hemithoraces suggest developing bilateral basilar pneumonia. This report has been electronically signed by Ish Jacobs MD.      EKG: Sinus tac    ASSESSMENT:      Active Problems:    Hypercapnic respiratory failure ([address]  Resolved Problems:    * No resolved hospital problems. *      PLAN:    1. Hypercapnic Respiratory Failure / On support with Vent / [facility] involved  2. Pneumonia / On Rocephin / Zithromax/Levaquin  3. Known COPD / On IV steroids / aerosols  4. Chronic Back pain / at this time sedated  5. On GI as well as DVT prophylaxis. Code Status: Full  DVT prophylaxis: Lovenox      Electronically signed by Stalin Vasquez MD on 2/6/2019 at 5:58 PM      NOTE: This report was transcribed using voice recognition software. Every effort was made to ensure accuracy; however, inadvertent computerized transcription errors may be present.

## 2019-02-06 NOTE — CONSULTS
Critical Care Admit/Consult Note         Patient - Jacqui Shedlon   MRN -  [id number]   Acct # - [de-identified]   - 1950      Date of Admission -  2019  3:25 AM  Date of evaluation -  2019  0206/0206-A   Hospital Day - 0            ADMIT/CONSULT DETAILS     Reason for Admit/Consult   Septic Shock    Consulting Service/Physician   Consulting - Yuridia Choudhury DO  Primary Care Physician - Karel Lyons DO         HPI   The patient is a 77 y.o. female with significant past medical history of COPD, CHF, hypertension, who presents to the emergency department with soreness of breath. Patient was febrile at home. Had increasing shortness of breath in spite of breathing treatments at home. In the emergency department she was hypotensive and deemed to be septic. She had central line placed and Levophed was initiated. Patient was also intubated secondary to hypercapnia. She denies nausea/vomiting or diarrhea.  Patient was transferred to the ICU for further management for septic shock         Past Medical History         Diagnosis Date    Chronic back pain     COPD (chronic obstructive pulmonary disease) ([address]     Headache     Hypertension     Osteoarthritis         Past Surgical History           Procedure Laterality Date     SECTION      HYSTERECTOMY         Current Medications   Current Medications    potassium chloride  20 mEq Intravenous Q1H    chlorhexidine  15 mL Mouth/Throat BID    pantoprazole  40 mg Intravenous Daily    And    sodium chloride (PF)  10 mL Intravenous Daily    methylPREDNISolone  40 mg Intravenous Q6H    [START ON 2019] cefTRIAXone (ROCEPHIN) IV  1 g Intravenous Q24H    azithromycin  500 mg Intravenous Q24H    insulin lispro  0-12 Units Subcutaneous TID WC    insulin lispro  0-6 Units Subcutaneous Nightly    ipratropium-albuterol  1 ampule Inhalation Q4H WA    enoxaparin  40 mg Subcutaneous Daily     glucose, dextrose, glucagon (rDNA), dextrose  IV Drips/Infusions   fentaNYL 5 mcg/ml in 0.9%  ml infusion Stopped (02/06/19 0732)    propofol 4 mcg/kg/min (02/06/19 0902)    norepinephrine 20 mcg/min (02/06/19 0831)    dextrose      sodium chloride       Home Medications  Prescriptions Prior to Admission: amLODIPine-benazepril (LOTREL) 5-20 MG per capsule, Take 1 capsule by mouth daily  cephALEXin (KEFLEX) 500 MG capsule, Take 1 capsule by mouth 2 times daily    Diet/Nutrition        Allergies   Sulfa antibiotics    Social History   Tobacco   reports that she has been smoking Cigarettes. She has a 54.00 pack-year smoking history. She has never used smokeless tobacco.    Alcohol     reports that she does not drink alcohol. Occupational history :    Family History   No family history on file. ROS     REVIEW OF SYSTEMS:  CONSTITUTIONAL:  Fever and fatigue. EYES:  negative  HEENT:  negative  RESPIRATORY:  Shortness of breath. Cough. Congestion.   CARDIOVASCULAR:  Shortness of breath  GASTROINTESTINAL:  negative  MUSCULOSKELETAL:  negative  NEUROLOGICAL:  negative  BEHAVIOR/PSYCH:  negative    Lines and Devices   ET tube, central line    Mechanical Ventilation Data   VENT SETTINGS (Comprehensive)  Vent Information  $Ventilation: $Initial Day  Ventilator Started: Yes  Vent Type: 840  Vent Mode: AC/VC  Vt Ordered: 450 mL  Rate Set: 20 bmp  Peak Flow: 50 L/min  Pressure Support: 0 cmH20  FiO2 : (S) 50 % (weaning)  Sensitivity: 3  PEEP/CPAP: 5  I Time/ I Time %: 0 s  Cuff Pressure (cm H2O): 29 cm H2O  Humidification Source: Heated wire  Humidification Temp: 37  Additional Respiratory  Assessments  Pulse: 104  Resp: 18  SpO2: 100 %  End Tidal CO2: 38 (%)  Humidification Source: Heated wire  Humidification Temp: 37  Subglottic Suction Done?: Yes  Cuff Pressure (cm H2O): 29 cm H2O    ABG  Lab Results   Component Value Date    PH 7.077 02/06/2019    PCO2 69.5 02/06/2019    PO2 176.7 02/06/2019    HCO3 20.0 02/06/2019    O2SAT 98.2 02/06/2019     Lab Results Component Value Date    MODE [facility] 2019           Vitals    height is 5' (1.524 m) and weight is 123 lb 0.3 oz (55.8 kg). Her core temperature is 98.2 °F (36.8 °C). Her blood pressure is 87/63 and her pulse is 104. Her respiration is 18 and oxygen saturation is 100%.        Temperature Range: Temp: 98.2 °F (36.8 °C) Temp  Av.1 °F (36.2 °C)  Min: 95.2 °F (35.1 °C)  Max: 98.2 °F (36.8 °C)  BP Range:  Systolic (19UHX), HECK:55 , Min:66 , AFZ:151     Diastolic (93QGS), QDU:61, Min:44, Max:78    Pulse Range: Pulse  Av.4  Min: 92  Max: 118  Respiration Range: Resp  Av.5  Min: 15  Max: 23  Current Pulse Ox[de-identified]  SpO2: 100 %  24HR Pulse Ox Range:  SpO2  Av.5 %  Min: 80 %  Max: 100 %  Oxygen Amount and Delivery:        I/O (24 Hours)    Patient Vitals for the past 8 hrs:   BP Temp Temp src Pulse Resp SpO2 Height Weight   19 0909 - - - 104 18 100 % - -   19 0850 87/63 98.2 °F (36.8 °C) CORE 103 23 - 5' (1.524 m) 123 lb 0.3 oz (55.8 kg)   19 0829 117/60 - - 96 22 100 % - -   19 0809 (!) 7144 97.5 °F (36.4 °C) CORE 92 - 100 % - -   19 0757 (!) 71/44 - - 92 - 100 % - -   19 0732 136/78 - - 105 - 100 % - -   19 0718 100/60 - - 95 - 100 % - -   19 0713 (!) 90/52 - - 96 - 100 % - -   19 0704 (!) 97/56 - - 112 - 100 % - -   19 0622 (!) 72/53 - - 103 - 100 % - -   19 0612 (!) 76/53 - - - - - - -   19 0546 (!) 89/58 - - 110 - 100 % - -   19 0457 100/61 - - 103 - 100 % - -   19 0431 132/62 97.2 °F (36.2 °C) CORE 102 - 100 % - -   19 0424 (!) 86/50 97.5 °F (36.4 °C) CORE 97 - 98 % - -   19 0409 94/63 - - 97 - 100 % - -   19 0403 81/73 - - 96 - 100 % - -   19 0402 (!) 66/47 - - 97 - 97 % - -   19 0355 - - - 108 - 98 % - -   19 0346 - - - - - - 5' (1.524 m) 121 lb (54.9 kg)   19 0325 102/65 95.2 °F (35.1 °C) - 118 15 (!) 80 % - -     No intake or output data in the 24 hours ending 19 0935  No intake/output data recorded. Patient Vitals for the past 96 hrs (Last 3 readings):   Weight   02/06/19 0850 123 lb 0.3 oz (55.8 kg)   02/06/19 0346 121 lb (54.9 kg)         Drains/Tubes Outputs  Golden catheter, central line, ET tube  Exam         PHYSICAL EXAM:  CONSTITUTIONAL:  Moderate distress secondary to shortness of breath. Intubated. EYES:  Lids and lashes normal, pupils equal, round and reactive to light, extra ocular muscles intact, sclera clear, conjunctiva normal  ENT:  Normocephalic, without obvious abnormality, atraumatic, sinuses nontender on palpation, external ears without lesions, oral pharynx with moist mucus membranes, tonsils without erythema or exudates, gums normal and good dentition. NECK:  Supple, symmetrical, trachea midline, no adenopathy, thyroid symmetric, not enlarged and no tenderness, skin normal  LUNGS:  Intubated. Diffuse crackles all lung fields. CARDIOVASCULAR:  Tachycardia  ABDOMEN:  No scars, normal bowel sounds, soft, non-distended, non-tender, no masses palpated, no hepatosplenomegally  MUSCULOSKELETAL:  There is no redness, warmth, or swelling of the joints. Full range of motion noted. Motor strength is 5 out of 5 all extremities bilaterally. Tone is normal.  NEUROLOGIC:  No focal deficits. 5 x 5 strength all extremities.   SKIN:  no bruising or bleeding    Data   Old records and images have been reviewed    Lab Results   CBC   Lab Results   Component Value Date    WBC 23.2 02/06/2019    RBC 5.00 02/06/2019    HGB 15.2 02/06/2019    HCT 50.6 02/06/2019     02/06/2019    .2 02/06/2019    MCH 30.4 02/06/2019    MCHC 30.0 02/06/2019    RDW 15.9 02/06/2019    NRBC 0.0 02/06/2019    METASPCT 0.9 02/06/2019    LYMPHOPCT 11.3 02/06/2019    MONOPCT 6.1 02/06/2019    BASOPCT 0.0 02/06/2019    MONOSABS 1.39 02/06/2019    LYMPHSABS 2.55 02/06/2019    EOSABS 0.00 02/06/2019    BASOSABS 0.00 02/06/2019       BMP Lab Results   Component Value Date     02/06/2019    K 3.1 02/06/2019    CL 98 02/06/2019    CO2 21 02/06/2019    BUN 41 02/06/2019    CREATININE 0.9 02/06/2019    GLUCOSE 220 02/06/2019    GLUCOSE 93 01/31/2011    CALCIUM 9.2 02/06/2019       LFTS  Lab Results   Component Value Date    ALKPHOS 145 02/06/2019    ALT 8 02/06/2019    AST 15 02/06/2019    PROT 7.9 02/06/2019    BILITOT 0.2 02/06/2019    LABALBU 3.4 02/06/2019    LABALBU 3.8 01/31/2011       INR  Recent Labs      02/06/19 0338   PROTIME  14.5*   INR  1.3       APTT  Recent Labs      02/06/19 0338   APTT  31.7       Lactic Acid  Lab Results   Component Value Date    LACTA 6.7 02/06/2019        BNP   No results for input(s): BNP in the last 72 hours. Cultures     No results for input(s): BC in the last 72 hours. No results for input(s): Virgilio Coventry in the last 72 hours. No results for input(s): LABURIN in the last 72 hours. Radiology     XR CHEST PORTABLE   Final Result   COPD with superimposed mild CHF. Right IJ central line without complications. XR CHEST PORTABLE   Final Result   Hazy opacities in the lower hemithoraces suggest developing bilateral basilar    pneumonia. This report has been electronically signed by Anirudh Wisdom MD.            SYSTEMS ASSESSMENT    Neuro   A and O ×3  -Continue to monitor neuro function.  -Propofol for sedation. Respiratory   Respiratory failure  - Secondary to COPD and pneumonia  - Patient is intubated  - Continue to monitor vent settings  - Propofol for sedation  - Daily ABGs   - DuoNeb nebs every 4 while awake    Pneumonia  - Rocephin for antibiotic coverage. Azithromycin as well. - Solu-Medrol 40 mg 3 times a day    Wean oxygen as tolerated.  Keep O2 sat 90-92%    Cardiovascular   CHF  - Continue to monitor on telemetry  - Consider diuresis    Gastrointestinal   GI prophylaxis with Protonix    Renal   No acute issues  - Continue to monitor creatinine     Infectious Disease   Pneumonia  - Rocephin and azithromycin ordered  - Consider infectious disease consult if needed  Film array pending    Hematology/Oncology   Leukocytosis  - Antibiotics initiated  - Continue to monitor white blood cell count and H&H    Endocrine   No acute issues  - Continue to monitor    Social/Spiritual/DNR/Other   Full code    I personally saw, examined and provided care for the patient. Radiographs, labs and medication list were reviewed by me independently. I spoke with bedside nursing, therapists and consultants. Critical care services and times documented are independent of procedures and multidisciplinary rounds with Residents. Additionally comprehensive, multidisciplinary rounds were conducted with the MICU team. The case was discussed in detail and plans for care were established. Review of Residents documentation was conducted and revisions were made as appropriate. I agree with the above documented exam, problem list and plan of care. 66-year-old lady with history of COPD tobacco use hypertension has been having increasing shortness of breath the last 2 days presents to ER on 2/6  With cough being treated Robitussin over-the-counter  patient was turning lethargic and blue. With respiratory distress      Patient was intubated in ER   Chest film showing basilar infiltrates  Influenza negative    1. Hypercapnic respiratory failure on mechanical ventilator   2. Community acquired pneumonia  3. Lactic acidosis resolved with IV fluids   4. COPD exacerbation  5. Chronic back pain   6. History of chronic tension type headaches headaches for which she takes fiorinal regularly up to 3 times a day (opiates and barbiturates + screen)  7. Opiate dependence has been receiving narcotics since 2016  8. 2/6 Echo showing EF 60% RSVP 64  9. Nicotine addiction Ciaran Solo is getting her narcotics  10. Elevated proBNP  11.  Hypertension    Look for barbiturate withdrawal seizures -Ativan as needed and sedated with propofol  Allow permissive

## 2019-02-06 NOTE — ED NOTES
Diprivan decreased to 2 mcg/kg/min due to hypotension. Levo increased to 20mcg/min.  Pressure prior to transfer to 117/60 hr [facility] 487, [address] [facility]  02/06/19 7583

## 2019-02-07 NOTE — PROGRESS NOTES
[facility]   Progress Note    Admitting Date and Time: 2/6/2019  3:25 AM  Admit Dx: Hypercapnic respiratory failure (HCC) [J96.92]  Hypercapnic respiratory failure ([address] [J96.92]    Subjective:  Patient admitted with Respiratory Failure. At this time remains on vent in icu. Patient was admitted with Hypercapnic respiratory failure ([address] [J96.92]  Hypercapnic respiratory failure ([address] [J96.92]. Patient feels comfortable,sedated. Per RN: No new issues    ROS: denies fever, chills, cp, sob, n/v, HA unless stated above.      insulin lispro  0-12 Units Subcutaneous Q6H    chlorhexidine  15 mL Mouth/Throat BID    pantoprazole  40 mg Intravenous Daily    And    sodium chloride (PF)  10 mL Intravenous Daily    methylPREDNISolone  40 mg Intravenous Q6H    cefTRIAXone (ROCEPHIN) IV  1 g Intravenous Q24H    azithromycin  500 mg Intravenous Q24H    ipratropium-albuterol  1 ampule Inhalation Q4H WA    enoxaparin  40 mg Subcutaneous Daily    LORazepam  1 mg Intravenous Q4H       glucose 15 g PRN   dextrose 12.5 g PRN   glucagon (rDNA) 1 mg PRN   dextrose 100 mL/hr PRN   perflutren lipid microspheres 1.5 mL ONCE PRN        Objective:    /80   Pulse 81   Temp 99.3 °F (37.4 °C) (Core)   Resp 20   Ht 5' (1.524 m)   Wt 125 lb 3.5 oz (56.8 kg)   SpO2 97%   BMI 24.46 kg/m²   General Appearance: Sedated, on vent well-developed and well-nourished, in no acute distress  Skin: warm and dry, no rash or erythema  Head: normocephalic and atraumatic  Eyes: pupils equal, round, and reactive to light, extraocular eye movements intact, conjunctivae normal  ENT: tympanic membrane, external ear and ear canal normal bilaterally, oropharynx clear and moist with normal mucous membranes  Neck: neck supple and non tender without mass, no thyromegaly or thyroid nodules, no cervical lymphadenopathy   Pulmonary/Chest: clear to auscultation bilaterally- no wheezes, rales or rhonchi, normal air movement, no respiratory distress  Cardiovascular: normal rate, normal S1 and S2, no gallops, intact distal pulses and no carotid bruits  Abdomen: soft, non-tender, non-distended, normal bowel sounds, no masses or organomegaly      Recent Labs      02/06/19   0338  02/06/19   1802  02/07/19   0505   NA  141   --   143   K  3.1*  3.7  3.8   CL  98   --   110*   CO2  21*   --   19*   BUN  41*   --   34*   CREATININE  0.9   --   0.7   GLUCOSE  220*   --   154*   CALCIUM  9.2   --   8.4*       Recent Labs      02/06/19   0338  02/07/19   0505   WBC  23.2*  16.7*   RBC  5.00  3.79   HGB  15.2  11.5   HCT  50.6*  37.8   MCV  101.2*  99.7   MCH  30.4  30.3   MCHC  30.0*  30.4*   RDW  15.9*  16.3*   PLT  373  310   MPV  11.0  10.3     WBC 16.7      Radiology:   XR CHEST PORTABLE   Final Result   COPD with superimposed mild CHF. Right IJ central line without complications. XR CHEST PORTABLE   Final Result   Hazy opacities in the lower hemithoraces suggest developing bilateral basilar    pneumonia. This report has been electronically signed by Letitia Ellsworth MD.          Assessment:    Active Problems:    Hypercapnic respiratory failure ([address]  Resolved Problems:    * No resolved hospital problems. *      Plan:  1. Hypercapnic Respiratory Failure / On support with Vent / [address] involved / possible wean starting tomorrow. 2. Pneumonia / On Rocephin / Zithromax/Levaquin  3. Known COPD / On IV steroids / aerosols  4. Chronic Back pain / at this time sedated  5. On GI as well as DVT prophylaxis.         Electronically signed by Elvin Hahn MD on 2/7/2019 at 4:55 PM

## 2019-02-07 NOTE — PLAN OF CARE
Problem: Nutrition  Goal: Optimal nutrition therapy  Outcome: Ongoing  Nutrition Problem: Inadequate oral intake  Intervention: Food and/or Nutrient Delivery: Continue current Tube Feeding  Nutritional Goals: Pt tolerate EN at goal rate, until PO is possible/extubation, stable dry wt

## 2019-02-07 NOTE — PROGRESS NOTES
DAILY VENTILATOR WEANING ASSESSMENT PERFORMED    P/FIO2 Ratio =  185        (<100= do not Wean)                  Cs = 31                         (<32= Instability)  Plat. Pressure = 17  MV = 10.8  RSBI =    Instabilities:       Cardiovascular =       CNS =       Respiratory =       Metabolic =    Parameters    no    Wean per protocol  no    Ask Physician for a weaning plan yes    Additional Comments: Patient with increased sedation, on pressers, RR 30 on ventilator. Will talk with Dr. Rosas Daley during rounds wean plan for patient. Performed by Crow Salcido RRT      Reference Table:    Cardiovascular     CNS      1. Mean BP less than or equal to 75   1. Neuromuscular blockade  2. Heart Rate greater than 130   2. RASS of -3, -4, -5  3. Myocardial Ischemia    3. RASS of +3, +4  4. Mechanical Assist Device    4. ICP greater than 15 or             Intracranial Hypertension         Respiratory      Metabolic  1. PEEP equal to or greater than 10cm/H20  1. Temp. (8hrs) less than 95 or > 103  2. Respiratory Rate greater than 35   2. WBC < 5000 or > 63256  3. Minute Volume greater than 15L  4. pH less than 7.30  5.  Deteriorating chest X-ray

## 2019-02-07 NOTE — PLAN OF CARE
Intensive Care Daily Quality Rounding Checklist      ICU Team Members:     ICU Day #:  2    Intubation Date:  02/06/19    Ventilator Day #:  02    Central Line Insertion Date: 02/05/19        Day #: 02     Arterial Line Insertion Date:       Day #:     DVT Prophylaxis: protonix    GI Prophylaxis: lovenox    Skin Issues/ Wounds and ordered treatment discussed on rounds:   Goals/ Plans for the Day:

## 2019-02-07 NOTE — PLAN OF CARE
Problem: Restraint Use - Nonviolent/Non-Self-Destructive Behavior:  Goal: Absence of restraint indications  Absence of restraint indications   Outcome: Not Met This Shift    Goal: Absence of restraint-related injury  Absence of restraint-related injury   Outcome: Met This Shift      Problem: Nutrition  Goal: Optimal nutrition therapy  Outcome: Not Met This Shift

## 2019-02-07 NOTE — PROGRESS NOTES
Critical Care Team - Daily Progress Note      Date and time: 2/7/2019 1:48 PM  Patient's name:  Jaya Negron [facility] Record Number: [id number]  Patient's account/billing number: [de-identified]  Patient's YOB: 1950  Age: 77 y.o. Date of Admission: 2/6/2019  3:25 AM  Length of stay during current admission: 1      Primary Care Physician: Linda Calhoun DO  ICU Attending Physician: Dr. Dwayne Kapoor Status: Full Code    Reason for ICU admission: Septic Shock      SUBJECTIVE:     OVERNIGHT EVENTS: No acute events overnight. Decreasing Levophed drip. We'll continue to monitor today. No planning awaiting today. Patient has COPD exacerbation component to this admission. We'll wait till tomorrow to assess possible weaning. HPI: The patient is a 77 y.o. female with significant past medical history of COPD, CHF, hypertension, who presents to the emergency department with soreness of breath. Patient was febrile at home. Had increasing shortness of breath in spite of breathing treatments at home. In the emergency department she was hypotensive and deemed to be septic. She had central line placed and Levophed was initiated. Patient was also intubated secondary to hypercapnia. She denies nausea/vomiting or diarrhea.  Patient was transferred to the ICU for further management for septic shock    AWAKE & FOLLOWING COMMANDS:  [] No   [x] Yes    CURRENT VENTILATION STATUS:     [x] Ventilator  [] BIPAP  [] Nasal Cannula [] Room Air      IF INTUBATED, ET TUBE MARKING AT LOWER LIP:       cms    SECRETIONS Amount:  [] Small [] Moderate  [] Large  [x] None  Color:     [] White [] Colored  [] Bloody    SEDATION:  RAAS Score:  [x] Propofol gtt  [] Versed gtt  [] Ativan gtt   [] No Sedation    PARALYZED:  [x] No    [] Yes    DIARRHEA:                [x] No                [] Yes  (C. Difficile status: [] positive [] negative                                                                                                                     [] pending)    VASOPRESSORS:  [] No    [x] Yes    If yes -   [x] Levophed       [] Dopamine     [] Vasopressin       [] Dobutamine  [] Phenylephrine         [] Epinephrine    CENTRAL LINES:     [] No   [x] Yes   (Date of Insertion:   )           If yes -     [x] Right IJ     [] Left IJ [] Right Femoral [] Left Femoral                   [] Right Subclavian [] Left Subclavian       TURNER'S CATHETER:   [] No   [x] Yes  (Date of Insertion:   )     URINE OUTPUT:            [x] Good   [] Low              [] Anuric      OBJECTIVE:     VITAL SIGNS:  /84   Pulse 101   Temp 99.3 °F (37.4 °C) (Axillary)   Resp 19   Ht 5' (1.524 m)   Wt 125 lb 3.5 oz (56.8 kg)   SpO2 100%   BMI 24.46 kg/m²   Tmax over 24 hours:  Temp (24hrs), Av.8 °F (37.7 °C), Min:99.2 °F (37.3 °C), Max:101.3 °F (38.5 °C)      Patient Vitals for the past 6 hrs:   BP Temp Temp src Pulse Resp SpO2   19 1216 - - - - 19 100 %   19 1205 - - - - 30 99 %   19 1202 - - - 101 30 95 %   19 1200 109/84 99.3 °F (37.4 °C) Axillary 89 30 96 %   19 1100 100/63 - - 74 12 97 %   19 1000 (!) 94/55 - - 123 29 95 %   19 0900 128/81 - - 115 29 95 %   19 0807 - - - - 30 97 %   19 0805 - - - 127 30 97 %   19 0800 125/76 99.2 °F (37.3 °C) Axillary 93 30 97 %         Intake/Output Summary (Last 24 hours) at 19 1348  Last data filed at 19 1100   Gross per 24 hour   Intake             2906 ml   Output             1250 ml   Net             1656 ml     Wt Readings from Last 2 Encounters:   19 125 lb 3.5 oz (56.8 kg)   18 121 lb (54.9 kg)     Body mass index is 24.46 kg/m². PHYSICAL EXAMINATION:  CONSTITUTIONAL:  Moderate distress secondary to shortness of breath. Intubated.   EYES:  Lids and lashes normal, pupils equal, round and reactive to light, extra ocular muscles intact, sclera clear, conjunctiva normal  ENT:  Normocephalic, without obvious abnormality, atraumatic, sinuses nontender on palpation, external ears without lesions, oral pharynx with moist mucus membranes, tonsils without erythema or exudates, gums normal and good dentition. NECK:  Supple, symmetrical, trachea midline, no adenopathy, thyroid symmetric, not enlarged and no tenderness, skin normal  LUNGS:  Intubated. Diffuse crackles all lung fields. CARDIOVASCULAR:  Tachycardia  ABDOMEN:  No scars, normal bowel sounds, soft, non-distended, non-tender, no masses palpated, no hepatosplenomegally  MUSCULOSKELETAL:  There is no redness, warmth, or swelling of the joints. Full range of motion noted. Motor strength is 5 out of 5 all extremities bilaterally. Tone is normal.  NEUROLOGIC:  No focal deficits. 5 x 5 strength all extremities.   SKIN:  no bruising or bleeding    MEDICATIONS:    Scheduled Meds:   insulin lispro  0-12 Units Subcutaneous Q6H    chlorhexidine  15 mL Mouth/Throat BID    pantoprazole  40 mg Intravenous Daily    And    sodium chloride (PF)  10 mL Intravenous Daily    methylPREDNISolone  40 mg Intravenous Q6H    cefTRIAXone (ROCEPHIN) IV  1 g Intravenous Q24H    azithromycin  500 mg Intravenous Q24H    ipratropium-albuterol  1 ampule Inhalation Q4H WA    enoxaparin  40 mg Subcutaneous Daily    LORazepam  1 mg Intravenous Q4H     Continuous Infusions:   fentaNYL 5 mcg/ml in 0.9%  ml infusion 75 mcg/hr (02/07/19 0702)    propofol 25 mcg/kg/min (02/07/19 1202)    norepinephrine 8 mcg/min (02/07/19 0945)    dextrose       PRN Meds:     glucose 15 g PRN   dextrose 12.5 g PRN   glucagon (rDNA) 1 mg PRN   dextrose 100 mL/hr PRN   perflutren lipid microspheres 1.5 mL ONCE PRN         VENT SETTINGS (Comprehensive) (if applicable):  Vent Information  $Ventilation: $Subsequent Day  Ventilator Started: Yes  Vent Type: 840  Vent Mode: AC/VC  Vt Ordered: 350 mL  Rate Set: 30 bmp  Peak Flow: 50 L/min  Pressure Support: 0 cmH20  FiO2 : 45 %  Sensitivity: 3  PEEP/CPAP: 5  I Time/ I Time %: 0 s  Cuff Pressure (cm H2O): 29 cm H2O  Humidification Source: Heated wire  Humidification Temp: 37  Humidification Temp Measured: 37  Circuit Condensation: Drained  Additional Respiratory  Assessments  Pulse: 101  Resp: 19  SpO2: 100 %  End Tidal CO2: 38 (%)  Humidification Source: Heated wire  Humidification Temp: 37  Circuit Condensation: Drained  Oral Care: Mouth suctioned  Subglottic Suction Done?: Yes  Cuff Pressure (cm H2O): 29 cm H2O    ABGs:     Laboratory findings:    Complete Blood Count: Recent Labs      02/06/19   0338  02/07/19   0505   WBC  23.2*  16.7*   HGB  15.2  11.5   HCT  50.6*  37.8   PLT  373  310        Last 3 Blood Glucose:   Recent Labs      02/06/19   0338  02/07/19   0505   GLUCOSE  220*  154*        PT/INR:    Lab Results   Component Value Date    PROTIME 14.5 02/06/2019    INR 1.3 02/06/2019     PTT:    Lab Results   Component Value Date    APTT 31.7 02/06/2019       Comprehensive Metabolic Profile:   Recent Labs      02/06/19   0338  02/06/19   1802  02/07/19   0505   NA  141   --   143   K  3.1*  3.7  3.8   CL  98   --   110*   CO2  21*   --   19*   BUN  41*   --   34*   CREATININE  0.9   --   0.7   GLUCOSE  220*   --   154*   CALCIUM  9.2   --   8.4*   PROT  7.9   --   6.2*   LABALBU  3.4*   --   2.6*   BILITOT  0.2   --   <0.2   ALKPHOS  145*   --   128*   AST  15   --   23   ALT  8   --   8      Magnesium:   Lab Results   Component Value Date    MG 2.3 02/07/2019     Phosphorus:   Lab Results   Component Value Date    PHOS 3.1 02/07/2019     Ionized Calcium: No results found for: CAION     Urinalysis:     Troponin:   Recent Labs      02/06/19 0338   TROPONINI  <0.01             Radiology/Imaging:   XR CHEST PORTABLE   Final Result   COPD with superimposed mild CHF. Right IJ central line without complications.             XR CHEST PORTABLE   Final Result   Hazy opacities in the lower hemithoraces suggest developing bilateral basilar    pneumonia. This report has been electronically signed by Kylie Morocho MD.            ASSESSMENT:     Patient Active Problem List    Diagnosis Date Noted    Hypercapnic respiratory failure ([address] 02/06/2019    Essential hypertension 11/11/2016    Medication monitoring encounter 07/19/2016    Chronic tension-type headache, not intractable 03/15/2016    Bilateral thoracic back pain 01/05/2016    Altered mental status 06/18/2015       Additional assessment:    ·         PLAN:     WEAN PER PROTOCOL:  [x] No   [] Yes  [] N/A  DISCONTINUE ANY LABS:   [x] No   [] Yes  ICU PROPHYLAXIS:  Stress ulcer:  [x] PPI Agent  [] [id number] [] Sucralfate  [] Other:  VTE:   [x] Enoxaparin  [] Unfract. Heparin Subcut  [] EPC Cuffs  NUTRITION:  [] NPO [x] Tube Feeding (Specify: ) [] TPN  [] PO (Diet: DIET TUBE FEED CONTINUOUS/CYCLIC NPO; STANDARD WITHOUT FIBER; Nasogastric; Continuous; 20; 50)  HOME MEDICATIONS RECONCILED: [x] No  [] Yes  INSULIN DRIP:   [x] No   [] Yes  CONSULTATION NEEDED:  [x] No   [] Yes  FAMILY UPDATED:    [] No   [x] Yes  TRANSFER OUT OF ICU:   [x] No   [] Yes  ADDITIONAL PLAN:  Neuro   A and O ×3  -Continue to monitor neuro function.  -Propofol for sedation.    - Patient has long history of opiate and barbiturate use. - Ativan was ordered for possible seizures secondary to withdrawal.   - Ativan when necessary.     Respiratory   Respiratory failure  - Secondary to COPD and pneumonia  - Patient is intubated  - Continue to monitor vent settings  - Propofol for sedation  - Daily ABGs   - DuoNeb nebs every 4 while awake  - Dose steroids initiated. - No weaning today. Will reassess and possible weaning starting tomorrow. Acidosis  - Continue to monitor ABGs. - Continue events     Pneumonia  - Rocephin for antibiotic coverage. Azithromycin as well. - Solu-Medrol 40 mg 3 times a day     Wean oxygen as tolerated. Keep O2 sat 90-92%     Cardiovascular   CHF  - Continue to monitor on telemetry  - Consider diuresis     Gastrointestinal   GI prophylaxis with Protonix  Initiate tube feeds.     Renal   No acute issues  - Continue to monitor creatinine     Infectious Disease   Pneumonia  - Rocephin and azithromycin ordered  - Consider infectious disease consult if needed  Film array pending     Hematology/Oncology   Leukocytosis  - Antibiotics initiated  - Continue to monitor white blood cell count and H&H     Endocrine   Diabetes  - sliding scale  - Continue to monitor     Social/Spiritual/DNR/Other   Full code      Casa Oconnor D.O.                  2/7/2019, 1:48 PM   personally saw, examined and provided care for the patient. Radiographs, labs and medication list were reviewed by me independently. I spoke with bedside nursing, therapists and consultants. Critical care services and times documented are independent of procedures and multidisciplinary rounds with Residents. Additionally comprehensive, multidisciplinary rounds were conducted with the MICU team. The case was discussed in detail and plans for care were established. Review of Residents documentation was conducted and revisions were made as appropriate. I agree with the above documented exam, problem list and plan of care.           78-year-old lady with history of COPD tobacco use hypertension has been having increasing shortness of breath the last 2 days presents to ER on 2/6  With cough being treated Robitussin over-the-counter  patient was turning lethargic and blue. With respiratory distress        Patient was intubated in ER   Chest film showing basilar infiltrates  Influenza negative       1. Hypercapnic respiratory failure on mechanical ventilator   2. Community acquired pneumonia  3. Lactic acidosis resolved with IV fluids   4. COPD exacerbation  5. Chronic back pain   6.  History of chronic tension type headaches headaches for which she takes fiorinal regularly up to 3 times a day (opiates and barbiturates + screen)  7. Opiate dependence has been receiving narcotics since 2016  8. 2/6 Echo showing EF 60% RSVP 64  9. Nicotine addiction Adrian Ortiz is getting her narcotics  10. Elevated proBNP  11.  Hypertension     Look for barbiturate withdrawal seizures -Ativan as needed and sedated with propofol  Allow permissive hypercapnia  Continue on propofol and fentanyl  Wean off levo fed  Continue antibiotics for continue cardiac pneumonia  stop IV fluids  Start tube feeds  On insulin sliding scale  Discussed with  at bedside

## 2019-02-08 NOTE — PLAN OF CARE
Problem: Risk for Impaired Skin Integrity  Goal: Tissue integrity - skin and mucous membranes  Structural intactness and normal physiological function of skin and  mucous membranes.    Outcome: Met This Shift      Problem: Falls - Risk of:  Goal: Will remain free from falls  Will remain free from falls   Outcome: Met This Shift    Goal: Absence of physical injury  Absence of physical injury   Outcome: Met This Shift      Problem: Restraint Use - Nonviolent/Non-Self-Destructive Behavior:  Goal: Absence of restraint indications  Absence of restraint indications   Outcome: Not Met This Shift    Goal: Absence of restraint-related injury  Absence of restraint-related injury   Outcome: Met This Shift

## 2019-02-08 NOTE — PATIENT CARE CONFERENCE
Intensive Care Daily Quality Rounding Checklist      ICU Team Members: bedside nurse, charge nurse, pharmacist ,respiratory therapist, Kimberli Caruso, residents    ICU Day #: NUMBER: 3    Intubation Date: February 6    Ventilator Day #: NUMBER: 3    Central Line Insertion Date: February 6        Day #: NUMBER: 3     Arterial Line Insertion Date: N/A      Day #: N/A    DVT Prophylaxis: Lovenox    GI Prophylaxis: Protonix    Skin Issues/ Wounds and ordered treatment discussed on rounds: no    Goals/ Plans for the Day: restraint order for patient safety, wean vent as tolerated

## 2019-02-08 NOTE — PROGRESS NOTES
[facility] Hospitalist   Progress Note    Admitting Date and Time: 2/6/2019  3:25 AM  Admit Dx: Hypercapnic respiratory failure (HCC) [J96.92]  Hypercapnic respiratory failure ([address] [J96.92]    Subjective:  Patient admitted with Respiratory Failure. At this time remains on vent in icu. Answered multiple questions by  who was Medic in [facility]     Patient was admitted with Hypercapnic respiratory failure ([address] [J96.92]  Hypercapnic respiratory failure ([address] [J96.92]. Patient feels comfortable,sedated. Per RN: No new issues and patient had peaceful night. ROS: denies fever, chills, cp, sob, n/v, HA unless stated above.      insulin lispro  0-12 Units Subcutaneous Q6H    chlorhexidine  15 mL Mouth/Throat BID    pantoprazole  40 mg Intravenous Daily    And    sodium chloride (PF)  10 mL Intravenous Daily    methylPREDNISolone  40 mg Intravenous Q6H    cefTRIAXone (ROCEPHIN) IV  1 g Intravenous Q24H    azithromycin  500 mg Intravenous Q24H    ipratropium-albuterol  1 ampule Inhalation Q4H WA    enoxaparin  40 mg Subcutaneous Daily       LORazepam 1 mg Q4H PRN   medicated lip balm  PRN   glucose 15 g PRN   dextrose 12.5 g PRN   glucagon (rDNA) 1 mg PRN   dextrose 100 mL/hr PRN   perflutren lipid microspheres 1.5 mL ONCE PRN        Objective:    /70   Pulse 109   Temp 98.8 °F (37.1 °C) (Core)   Resp (!) 32   Ht 5' (1.524 m)   Wt 129 lb 13.6 oz (58.9 kg)   SpO2 97%   BMI 25.36 kg/m²   General Appearance: Sedated, on vent well-developed and well-nourished, in no acute distress  Skin: warm and dry, no rash or erythema  Head: normocephalic and atraumatic  Eyes: pupils equal, round, and reactive to light, extraocular eye movements intact, conjunctivae normal  ENT: tympanic membrane, external ear and ear canal normal bilaterally, oropharynx clear and moist with normal mucous membranes  Neck: neck supple and non tender without mass, no thyromegaly or thyroid nodules, no cervical Ángela Warner MD on 2/8/2019 at 8:35 AM

## 2019-02-08 NOTE — PROGRESS NOTES
Critical Care Team - Daily Progress Note      Date and time: 2/8/2019 6:22 AM  Patient's name:  Jaya [facility] Record Number: [id number]  Patient's account/billing number: [de-identified]  Patient's YOB: 1950  Age: 77 y.o. Date of Admission: 2/6/2019  3:25 AM  Length of stay during current admission: 2      Primary Care Physician: Dmitry Seen, DO  ICU Attending Physician: Dr. Jigna Lane Status: Full Code    Reason for ICU admission: Septic Shock      SUBJECTIVE:     OVERNIGHT EVENTS: No acute events overnight. HPI: The patient is a 77 y.o. female with significant past medical history of COPD, CHF, hypertension, who presents to the emergency department with soreness of breath. Patient was febrile at home. Had increasing shortness of breath in spite of breathing treatments at home. In the emergency department she was hypotensive and deemed to be septic. She had central line placed and Levophed was initiated. Patient was also intubated secondary to hypercapnia. She denies nausea/vomiting or diarrhea.  Patient was transferred to the ICU for further management for septic shock    AWAKE & FOLLOWING COMMANDS:  [] No   [x] Yes    CURRENT VENTILATION STATUS:     [x] Ventilator  [] BIPAP  [] Nasal Cannula [] Room Air      IF INTUBATED, ET TUBE MARKING AT LOWER LIP:       cms    SECRETIONS Amount:  [] Small [] Moderate  [] Large  [x] None  Color:     [] White [] Colored  [] Bloody    SEDATION:  RAAS Score:  [x] Propofol gtt  [] Versed gtt  [] Ativan gtt   [] No Sedation    PARALYZED:  [x] No    [] Yes    DIARRHEA:                [x] No                [] Yes  (C. Difficile status: [] positive                                                                                                                       [] negative                                                                                                                     [] pending)    VASOPRESSORS:  [] No    [x] Yes    If yes -   [x] Levophed       [] Dopamine     [] Vasopressin       [] Dobutamine  [] Phenylephrine         [] Epinephrine    CENTRAL LINES:     [] No   [x] Yes   (Date of Insertion:   )           If yes -     [x] Right IJ     [] Left IJ [] Right Femoral [] Left Femoral                   [] Right Subclavian [] Left Subclavian       TURNER'S CATHETER:   [] No   [x] Yes  (Date of Insertion:   )     URINE OUTPUT:            [x] Good   [] Low              [] Anuric      OBJECTIVE:     VITAL SIGNS:  /69   Pulse 103   Temp 98.8 °F (37.1 °C) (Core)   Resp 29   Ht 5' (1.524 m)   Wt 125 lb 3.5 oz (56.8 kg)   SpO2 95%   BMI 24.46 kg/m²   Tmax over 24 hours:  Temp (24hrs), Av °F (37.2 °C), Min:98.8 °F (37.1 °C), Max:99.3 °F (37.4 °C)      Patient Vitals for the past 6 hrs:   BP Temp Temp src Pulse Resp SpO2   19 0500 100/69 - - 103 29 95 %   19 0400 102/70 98.8 °F (37.1 °C) CORE 96 29 100 %   19 0339 - - - - 21 97 %   19 0335 - - - 88 22 96 %   19 0300 99/66 - - 93 22 97 %   19 0200 94/64 - - 96 18 96 %   19 0100 82/62 - - 94 30 95 %         Intake/Output Summary (Last 24 hours) at 19 0622  Last data filed at 19 0500   Gross per 24 hour   Intake             1018 ml   Output             1110 ml   Net              -92 ml     Wt Readings from Last 2 Encounters:   19 125 lb 3.5 oz (56.8 kg)   18 121 lb (54.9 kg)     Body mass index is 24.46 kg/m². PHYSICAL EXAMINATION:  CONSTITUTIONAL:  No distress. Intubated. EYES:  Lids and lashes normal, pupils equal, round and reactive to light, extra ocular muscles intact, sclera clear, conjunctiva normal  ENT:  Normocephalic, without obvious abnormality, atraumatic, sinuses nontender on palpation, external ears without lesions, oral pharynx with moist mucus membranes, tonsils without erythema or exudates, gums normal and good dentition.   NECK:  Supple, symmetrical, trachea midline, no adenopathy, thyroid symmetric, not enlarged and no tenderness, skin normal  LUNGS:  Intubated. Diffuse crackles all lung fields. CARDIOVASCULAR:  Tachycardia  ABDOMEN:  No scars, normal bowel sounds, soft, non-distended, non-tender, no masses palpated, no hepatosplenomegally  MUSCULOSKELETAL:  There is no redness, warmth, or swelling of the joints. Full range of motion noted. Motor strength is 5 out of 5 all extremities bilaterally. Tone is normal.  NEUROLOGIC:  No focal deficits. 5 x 5 strength all extremities.   SKIN:  no bruising or bleeding    MEDICATIONS:    Scheduled Meds:   insulin lispro  0-12 Units Subcutaneous Q6H    chlorhexidine  15 mL Mouth/Throat BID    pantoprazole  40 mg Intravenous Daily    And    sodium chloride (PF)  10 mL Intravenous Daily    methylPREDNISolone  40 mg Intravenous Q6H    cefTRIAXone (ROCEPHIN) IV  1 g Intravenous Q24H    azithromycin  500 mg Intravenous Q24H    ipratropium-albuterol  1 ampule Inhalation Q4H WA    enoxaparin  40 mg Subcutaneous Daily    LORazepam  1 mg Intravenous Q4H     Continuous Infusions:   fentaNYL 5 mcg/ml in 0.9%  ml infusion 75 mcg/hr (02/08/19 0427)    propofol 30 mcg/kg/min (02/08/19 0200)    norepinephrine 2 mcg/min (02/08/19 0000)    dextrose       PRN Meds:     medicated lip balm  PRN   glucose 15 g PRN   dextrose 12.5 g PRN   glucagon (rDNA) 1 mg PRN   dextrose 100 mL/hr PRN   perflutren lipid microspheres 1.5 mL ONCE PRN         VENT SETTINGS (Comprehensive) (if applicable):  Vent Information  $Ventilation: $Subsequent Day  Ventilator Started: Yes  Vent Type: 840  Vent Mode: AC/VC  Vt Ordered: 350 mL  Rate Set: 30 bmp  Peak Flow: 50 L/min  Pressure Support: 0 cmH20  FiO2 : 40 %  Sensitivity: 3  PEEP/CPAP: 5  I Time/ I Time %: 0 s  Cuff Pressure (cm H2O): 29 cm H2O  Humidification Source: Heated wire  Humidification Temp: 37  Humidification Temp Measured: 37  Circuit Condensation: Drained  Additional Respiratory  Assessments  Pulse: 103  Resp: Consider infectious disease consult if needed  Film array pending     Hematology/Oncology   Leukocytosis  - Antibiotics initiated  - Continue to monitor white blood cell count and H&H     Endocrine   Diabetes  - sliding scale  - Continue to monitor     Social/Spiritual/DNR/Other   Full code      German Garcia D.O.                  2/8/2019, 6:22 AM   I  personally saw, examined and provided care for the patient. Radiographs, labs and medication list were reviewed by me independently. I spoke with bedside nursing, therapists and consultants. Critical care services and times documented are independent of procedures and multidisciplinary rounds with Residents. Additionally comprehensive, multidisciplinary rounds were conducted with the MICU team. The case was discussed in detail and plans for care were established. Review of Residents documentation was conducted and revisions were made as appropriate. I agree with the above documented exam, problem list and plan of care.           75-year-old lady with history of COPD tobacco use hypertension has been having increasing shortness of breath the last 2 days presents to ER on 2/6  With cough being treated Robitussin over-the-counter  patient was turning lethargic and blue.  With respiratory distress        Patient was intubated in ER   Chest film showing basilar infiltrates  Influenza negative  2/7  Hypercapnic on pressors  2/8 off norepinephrine this morning     2. Hypercapnic respiratory failure on mechanical ventilator   3. Community acquired pneumonia  4. COPD exacerbation  5. Chronic back pain   6. History of chronic tension type headaches headaches for which she takes fiorinal regularly up to 3 times a day (opiates and barbiturates + screen)  7. Opiate dependence has been receiving narcotics since 2016  8. 2/6 Echo showing EF 60% RSVP 64  9. Nicotine addiction   10. Elevated proBNP  11.  Hypertension     Look for barbiturate withdrawal seizures -Ativan as needed

## 2019-02-08 NOTE — PLAN OF CARE
Problem: Restraint Use - Nonviolent/Non-Self-Destructive Behavior:  Goal: Absence of restraint-related injury  Absence of restraint-related injury   Outcome: Met This Shift

## 2019-02-09 NOTE — PLAN OF CARE
Problem: Restraint Use - Nonviolent/Non-Self-Destructive Behavior:  Goal: Absence of restraint indications  Absence of restraint indications   Outcome: Not Met This Shift  Requires restraints  Goal: Absence of restraint-related injury  Absence of restraint-related injury   Outcome: Met This Shift

## 2019-02-09 NOTE — PLAN OF CARE
Problem: Risk for Impaired Skin Integrity  Goal: Tissue integrity - skin and mucous membranes  Structural intactness and normal physiological function of skin and  mucous membranes.    Outcome: Met This Shift      Problem: Falls - Risk of:  Goal: Will remain free from falls  Will remain free from falls   Outcome: Met This Shift    Goal: Absence of physical injury  Absence of physical injury   Outcome: Met This Shift      Problem: Restraint Use - Nonviolent/Non-Self-Destructive Behavior:  Goal: Absence of restraint indications  Absence of restraint indications   Outcome: Ongoing    Goal: Absence of restraint-related injury  Absence of restraint-related injury   Outcome: Met This Shift      Problem: Nutrition  Goal: Optimal nutrition therapy  Outcome: Met This Shift

## 2019-02-09 NOTE — PATIENT CARE CONFERENCE
Intensive Care Daily Quality Rounding Checklist      ICU Team Members: Dr. Miriam Lr, bedside nurse,     ICU Day #: NUMBER: 4    Intubation Date: February 6    Ventilator Day #: NUMBER: 4    Central Line Insertion Date: February 6        Day #: NUMBER: 4     Arterial Line Insertion Date: N/A      Day #: N/A    DVT Prophylaxis: Lovenox    GI Prophylaxis: Protonix    Skin Issues/ Wounds and ordered treatment discussed on rounds:     Goals/ Plans for the Day: hold fentanyl, monitor BP, wean vent as tolerated, obtain restraint order, continue critical care management.

## 2019-02-09 NOTE — PROGRESS NOTES
[facility]   [facility] Note    Admitting Date and Time: 2/6/2019  3:25 AM  Admit Dx: Hypercapnic respiratory failure (HCC) [J96.92]  Hypercapnic respiratory failure ([address] [J96.92]    Subjective:  Patient admitted with Respiratory Failure. At this time remains on vent in icu. Comfortable. Did not do well with weaning trials. Patient was admitted with Hypercapnic respiratory failure ([address] [J96.92]  Hypercapnic respiratory failure ([address] [J96.92]. Patient feels comfortable,sedated. Per RN: No new issues and patient had peaceful night. ROS: denies fever, chills, cp, sob, n/v, HA unless stated above.      metoprolol tartrate  25 mg Oral BID    methylPREDNISolone  40 mg Intravenous Q12H    insulin lispro  0-12 Units Subcutaneous Q6H    chlorhexidine  15 mL Mouth/Throat BID    pantoprazole  40 mg Intravenous Daily    And    sodium chloride (PF)  10 mL Intravenous Daily    cefTRIAXone (ROCEPHIN) IV  1 g Intravenous Q24H    azithromycin  500 mg Intravenous Q24H    ipratropium-albuterol  1 ampule Inhalation Q4H WA    enoxaparin  40 mg Subcutaneous Daily       LORazepam 1 mg Q4H PRN   medicated lip balm  PRN   glucose 15 g PRN   dextrose 12.5 g PRN   glucagon (rDNA) 1 mg PRN   dextrose 100 mL/hr PRN   perflutren lipid microspheres 1.5 mL ONCE PRN        Objective:    BP (!) 154/96   Pulse 103   Temp 99.3 °F (37.4 °C) (Core)   Resp 14   Ht 5' (1.524 m)   Wt 130 lb 4.7 oz (59.1 kg)   SpO2 99%   BMI 25.45 kg/m²   General Appearance: Sedated, on vent well-developed and well-nourished, in no acute distress  Skin: warm and dry, no rash or erythema  Head: normocephalic and atraumatic  Eyes: pupils equal, round, and reactive to light, extraocular eye movements intact, conjunctivae normal  ENT: tympanic membrane, external ear and ear canal normal bilaterally, oropharynx clear and moist with normal mucous membranes  Neck: neck supple and non tender without mass, no thyromegaly or thyroid nodules, no cervical lymphadenopathy   Pulmonary/Chest: clear to auscultation bilaterally- no wheezes, rales or rhonchi, normal air movement, no respiratory distress  Cardiovascular: normal rate, normal S1 and S2, no gallops, intact distal pulses and no carotid bruits  Abdomen: soft, non-tender, non-distended, normal bowel sounds, no masses or organomegaly      Recent Labs      02/07/19   0505  02/08/19   0645  02/09/19   0636   NA  143  143  142   K  3.8  3.6  4.8   CL  110*  111*  109*   CO2  19*  22  24   BUN  34*  39*  43*   CREATININE  0.7  0.8  0.7   GLUCOSE  154*  211*  162*   CALCIUM  8.4*  8.8  8.7       Recent Labs      02/07/19   0505  02/08/19   0645  02/09/19   0636   WBC  16.7*  12.5*  19.2*   RBC  3.79  3.54  3.77   HGB  11.5  11.0*  11.5   HCT  37.8  34.9  38.0   MCV  99.7  98.6  100.8*   MCH  30.3  31.1  30.5   MCHC  30.4*  31.5*  30.3*   RDW  16.3*  16.5*  16.6*   PLT  310  251  256   MPV  10.3  10.0  10.0     WBC 12.5      Radiology:   XR CHEST PORTABLE   Final Result   Stable endotracheal tube, NG tube and right internal jugular central   venous catheter placement. Stable bilateral pleural effusions. Tortuous and ectatic aorta. Pulmonary vascular congestion consistent with fluid overload         The exam has been dictated and signed by Elan Roman PA-C. Dmitry Alvarado MD, Radiologist, have reviewed the images and   report and concur with these findings. XR CHEST PORTABLE   Final Result      XR CHEST PORTABLE   Final Result   COPD with superimposed mild CHF. Right IJ central line without complications. XR CHEST PORTABLE   Final Result   Hazy opacities in the lower hemithoraces suggest developing bilateral basilar    pneumonia.        This report has been electronically signed by Maryam Vazquez MD.      XR CHEST PORTABLE    (Results Pending)       Assessment:    Active Problems:    Hypercapnic respiratory failure ([address]  Resolved Problems:    * No resolved hospital problems. *      Plan:  1. Hypercapnic Respiratory Failure / On support with Vent /Per CC  2. Pneumonia / On Rocephin / Zithromax/Levaquin-Improving Leucocytosis  3. Known COPD / On IV steroids / aerosols  4. Chronic Back pain / at this time sedated / has Narcotics dependence, will be issue once extubated. 5. On GI as well as DVT prophylaxis.         Electronically signed by Pro Benton MD on 2/9/2019 at 3:19 PM

## 2019-02-09 NOTE — PROGRESS NOTES
Critical Care Team - Daily Progress Note         Date and time: 2/9/2019 2:35 PM  Patient's name:  [facility] Record Number: [id number]  Patient's account/billing number: [de-identified]  Patient's YOB: 1950  Age: 77 y.o. Date of Admission: 2/6/2019  3:25 AM  Length of stay during current admission: 3    Primary Care Physician: Carol Amin DO  ICU Attending Physician: Dr. Debra Mariano    Code Status: Full Code    Reason for ICU admission: Respiratory failure*      SUBJECTIVE:   77 y.o. female with significant past medical history of COPD, CHF, hypertension, who presents to the emergency department with soreness of breath. Patient was febrile at home. Had increasing shortness of breath in spite of breathing treatments at home. In the emergency department she was hypotensive and deemed to be septic. She had central line placed and Levophed was initiated. Patient was also intubated secondary to hypercapnia. She denies nausea/vomiting or diarrhea.  Patient was transferred to the ICU for further management for septic shock    OVERNIGHT EVENTS:     More awake when fentanyl is decreased  CURRENT VENTILATION STATUS:   [] Ventilator  [] BIPAP  [] Nasal Cannula [] Room Air    IF INTUBATED, ET TUBE MARKING AT LOWER LIP:       cms  SECRETIONS Amount:  [x] Small [] Moderate  [] Large  [] None  Color:     [] White [] Colored  [x] Bloody  SEDATION:  RAAS Score:  [] Propofol gtt  [] Versed gtt  [] Ativan gtt   [] No Sedation fentanyl  PARALYZED:  [x] No    [] Yes  VASOPRESSORS:  [x] No    [] Yes    If yes - [] Levophed       [] Dopamine     [] Vasopressin       [] Dobutamine  [] Phenylephrine         [] Epinephrine  CENTRAL LINES:     [] No   [x] Yes   (Date of Insertion:   )           If yes -     [x] Right IJ     [] Left IJ [] Right Femoral [] Left Femoral                   [] Right Subclavian [] Left Subclavian   [] PICC Line  TURNER'S CATHETER:   [] No   [x] Yes  (Date of Insertion:   ) URINE OUTPUT:            [x] Good   [] Low              [] Anuric      OBJECTIVE:     VITAL SIGNS:  BP (!) 154/96   Pulse 103   Temp 99.3 °F (37.4 °C) (Core)   Resp 14   Ht 5' (1.524 m)   Wt 130 lb 4.7 oz (59.1 kg)   SpO2 99%   BMI 25.45 kg/m²   Tmax over 24 hours:  Temp (24hrs), Av.2 °F (37.3 °C), Min:98.6 °F (37 °C), Max:99.5 °F (37.5 °C)      Patient Vitals for the past 6 hrs:   BP Temp Temp src Pulse Resp SpO2   19 1400 (!) 154/96 - - 103 14 99 %   19 1300 134/83 - - 94 12 99 %   19 1223 134/83 - - 108 13 99 %   19 1200 134/83 99.3 °F (37.4 °C) CORE 105 14 100 %   19 1100 (!) 146/96 - - 96 15 95 %   19 1000 (!) 170/92 - - 100 14 93 %   19 0900 (!) 143/84 - - 113 15 94 %         Intake/Output Summary (Last 24 hours) at 19 1435  Last data filed at 19 1223   Gross per 24 hour   Intake             1381 ml   Output             1040 ml   Net              341 ml     Wt Readings from Last 2 Encounters:   19 130 lb 4.7 oz (59.1 kg)   18 121 lb (54.9 kg)     Body mass index is 25.45 kg/m².         PHYSICAL EXAMINATION:    General appearance - alert, and in no distress Intubated  Mental status - alert, oriented to person, place, and time  Eyes - pupils equal and reactive, extraocular eye movements intact  Ears - external ear canals normal  Nose - normal and patent,, discharge   Mouth - mucous membranes moist, pharynx normal without lesions  Neck - supple, no significant adenopathy, JVD  Chest - clear to auscultation, no wheezes, rales or rhonchi, symmetric air entry  Heart - normal rate, regular rhythm, normal S1, S2, no murmurs, rubs, clicks or gallops  Abdomen - soft, nontender, nondistended, no masses or organomegaly  Neurological - , no focal findings or movement disorder noted  Extremities - peripheral pulses normal, no pedal edema, no clubbing or cyanosis  Skin - normal coloration and turgor, no rashes, no suspicious skin lesions noted MEDICATIONS:    Scheduled Meds:   metoprolol tartrate  25 mg Oral BID    methylPREDNISolone  40 mg Intravenous Q12H    insulin lispro  0-12 Units Subcutaneous Q6H    chlorhexidine  15 mL Mouth/Throat BID    pantoprazole  40 mg Intravenous Daily    And    sodium chloride (PF)  10 mL Intravenous Daily    cefTRIAXone (ROCEPHIN) IV  1 g Intravenous Q24H    azithromycin  500 mg Intravenous Q24H    ipratropium-albuterol  1 ampule Inhalation Q4H WA    enoxaparin  40 mg Subcutaneous Daily     Continuous Infusions:   fentaNYL 5 mcg/ml in 0.9%  ml infusion 25 mcg/hr (02/09/19 1250)    dextrose       PRN Meds:     LORazepam 1 mg Q4H PRN   medicated lip balm  PRN   glucose 15 g PRN   dextrose 12.5 g PRN   glucagon (rDNA) 1 mg PRN   dextrose 100 mL/hr PRN   perflutren lipid microspheres 1.5 mL ONCE PRN         VENT SETTINGS (Comprehensive) (if applicable):  Vent Information  $Ventilation: $Subsequent Day  Ventilator Started: Yes  Vent Type: 840  Vent Mode: AC/VC  Vt Ordered: 350 mL  Rate Set: 12 bmp  Peak Flow: 65 L/min  Pressure Support: 0 cmH20  FiO2 : 35 %  Sensitivity: 1  PEEP/CPAP: 5  I Time/ I Time %: 0 s  Cuff Pressure (cm H2O): 29 cm H2O  Humidification Source: Heated wire  Humidification Temp: 37  Humidification Temp Measured: 36.9  Circuit Condensation: Drained  Additional Respiratory  Assessments  Pulse: 103  Resp: 14  SpO2: 99 %  End Tidal CO2: 38 (%)  Humidification Source: Heated wire  Humidification Temp: 37  Circuit Condensation: Drained  Oral Care: Mouth swabbed, Mouth suctioned  Subglottic Suction Done?: Yes  Cuff Pressure (cm H2O): 29 cm H2O  ABGs:   Recent Labs      02/09/19   1031   PH  7.325*   PCO2  52.3*   PO2  58.8*   HCO3  26.6*   BE  0.0   O2SAT  90.2*       Laboratory findings:  Complete Blood Count: Recent Labs      02/07/19   0505  02/08/19   0645  02/09/19   0636   WBC  16.7*  12.5*  19.2*   HGB  11.5  11.0*  11.5   HCT  37.8  34.9  38.0   PLT  310  251  256        Last 3 Blood Glucose:   Recent Labs      02/07/19   0505  02/08/19   0645  02/09/19   0636   GLUCOSE  154*  211*  162*        PT/INR:    Lab Results   Component Value Date    PROTIME 14.5 02/06/2019    INR 1.3 02/06/2019     PTT:    Lab Results   Component Value Date    APTT 31.7 02/06/2019       Comprehensive Metabolic Profile:   Recent Labs      02/07/19   0505  02/08/19   0645  02/09/19   0636   NA  143  143  142   K  3.8  3.6  4.8   CL  110*  111*  109*   CO2  19*  22  24   BUN  34*  39*  43*   CREATININE  0.7  0.8  0.7   GLUCOSE  154*  211*  162*   CALCIUM  8.4*  8.8  8.7   PROT  6.2*  5.8*  6.0*   LABALBU  2.6*  2.2*  2.5*   BILITOT  <0.2  <0.2  <0.2   ALKPHOS  128*  85  88   AST  23  20  19   ALT  8  9  11      Magnesium:   Lab Results   Component Value Date    MG 2.3 02/09/2019     Phosphorus:   Lab Results   Component Value Date    PHOS 3.3 02/09/2019     Microbiology:    Cultures during this admission:       Blood cultures:                  [] None drawn      [] Negative     []  Positive     [] Pending   Urine Culture:                    [] None drawn      [] Negative     []  Positive     [] Pending  Sputum Culture:                [] None drawn      [] Negative     [x]  Positive     [] Pending Haemophilus  Endotracheal aspirate:      [] None drawn      [] Negative     []  Positive     [] Pending  Stool:    [] None Sent        [] Negative     []  Positive     [] Pending   Other Micro:   [] None Sent        [] Negative     []  Positive     [] Pending     Other pertinent Labs:       Radiology/Imaging:       ASSESSMENT:     Active Problems:    Hypercapnic respiratory failure (HCC)  Resolved Problems:    * No resolved hospital problems.  *       64-year-old lady with history of COPD tobacco use hypertension has been having increasing shortness of breath the last 2 days presents to ER on 2/6  With cough being treated Robitussin over-the-counter  patient was turning lethargic and blue.  With respiratory distress        Patient was intubated in ER   Chest film showing basilar infiltrates  Influenza negative  2/7  Hypercapnic on pressors  2/8 off norepinephrine this morning  2/9 attempted to stop fentanyl attempted CPAP trial blood gases showing respiratory acidosis  Patient tachycardic at 120     1. Hypercapnic respiratory failure on mechanical ventilator   2. Haemophilus pneumonia on Rocephin  3. COPD exacerbation  4. Chronic back pain   5. History of chronic tension type headaches headaches for which she takes fiorinal regularly up to 3 times a day (opiates and barbiturates + screen)  6. Opiate dependence has been receiving narcotics since 2016  7. 2/6 Echo showing EF 60% RSVP 64  8. Nicotine addiction   9. Elevated proBNP  10. Hypertension         PLAN:      Not tolerating trials today  We'll switch over to Precedex from fentanyl and see if this will help with the CO2 retention   started on metoprolol 25 mg b.i.d. can increase if needed  Restarted tube feeds  On insulin sliding scale    > 30 min     WEAN PER PROTOCOL:  [] No   [x] Yes  [] N/A  DISCONTINUE ANY LABS:   [x] No   [] Yes  ICU PROPHYLAXIS:  Stress ulcer:  [x] PPI Agent  [] [id number] [] Sucralfate  [] Other:  VTE:   [x] Enoxaparin  [] Unfract. Heparin Subcut  [] EPC Cuffs  NUTRITION:  [] NPO [x] Tube Feeding (Specify: ) [] TPN  [] PO (Diet: DIET TUBE FEED CONTINUOUS/CYCLIC NPO; STANDARD WITHOUT FIBER; Nasogastric; Continuous; 20; 50)  HOME MEDICATIONS RECONCILED: [x] No   [] Yes  INSULIN DRIP:    [x] No   [] Yes  CONSULTATION NEEDED:    [x] No   [] Yes  FAMILY UPDATED:     [] No   [x] Yes  TRANSFER OUT OF ICU:    [x] No   [] Yes      Anna Houser M.D. Suri PALACIOS                     I personally saw, examined and provided care for the patient. Radiographs, labs and medication list were reviewed by me independently. I spoke with bedside nursing, therapists and consultants.  Critical care services and times documented are independent of procedures and multidisciplinary rounds with Residents. Additionally comprehensive, multidisciplinary rounds were conducted with the MICU team. The case was discussed in detail and plans for care were established.     2/9/2019, 2:35 PM  > 30 min

## 2019-02-10 NOTE — PROGRESS NOTES
Patient was extubated to 40% O2 via Venturi face mask due to mouth breathing. Breath Sounds post extubation were Diminished. Stridor was not present post extubation. SPO2 was 93%.       Performed by  [facility]

## 2019-02-10 NOTE — PROGRESS NOTES
[facility] CARE AT [facility]   Progress Note    Admitting Date and Time: 2/6/2019  3:25 AM  Admit Dx: Hypercapnic respiratory failure (HCC) [J96.92]  Hypercapnic respiratory failure ([address] [J96.92]    Subjective:  Patient admitted with Respiratory Failure. At this time remains  in icu. Comfortable. Has been extubated however she is on Bi pap    Patient was admitted with Hypercapnic respiratory failure ([address] [J96.92]  Hypercapnic respiratory failure ([address] [J96.92]. Patient feels comfortable,sedated. Per RN: No new issues and patient had peaceful night. ROS: denies fever, chills, cp, sob, n/v, HA unless stated above.      metoprolol tartrate  25 mg Oral BID    methylPREDNISolone  40 mg Intravenous Q12H    insulin lispro  0-12 Units Subcutaneous Q6H    chlorhexidine  15 mL Mouth/Throat BID    pantoprazole  40 mg Intravenous Daily    And    sodium chloride (PF)  10 mL Intravenous Daily    cefTRIAXone (ROCEPHIN) IV  1 g Intravenous Q24H    azithromycin  500 mg Intravenous Q24H    ipratropium-albuterol  1 ampule Inhalation Q4H WA    enoxaparin  40 mg Subcutaneous Daily       hydrALAZINE 10 mg Q6H PRN   LORazepam 1 mg Q4H PRN   medicated lip balm  PRN   glucose 15 g PRN   dextrose 12.5 g PRN   glucagon (rDNA) 1 mg PRN   dextrose 100 mL/hr PRN   perflutren lipid microspheres 1.5 mL ONCE PRN        Objective:    /85   Pulse 103   Temp 98.3 °F (36.8 °C) (Core)   Resp 17   Ht 5' (1.524 m)   Wt 129 lb 6.6 oz (58.7 kg)   SpO2 96%   BMI 25.27 kg/m²   General Appearance: Sedated, on vent well-developed and well-nourished, in no acute distress  Skin: warm and dry, no rash or erythema  Head: normocephalic and atraumatic  Eyes: pupils equal, round, and reactive to light, extraocular eye movements intact, conjunctivae normal  ENT: tympanic membrane, external ear and ear canal normal bilaterally, oropharynx clear and moist with normal mucous membranes  Neck: neck supple and non tender without mass, no thyromegaly or thyroid nodules, no cervical lymphadenopathy   Pulmonary/Chest: clear to auscultation bilaterally- no wheezes, rales or rhonchi, normal air movement, no respiratory distress  Cardiovascular: normal rate, normal S1 and S2, no gallops, intact distal pulses and no carotid bruits  Abdomen: soft, non-tender, non-distended, normal bowel sounds, no masses or organomegaly      Recent Labs      02/08/19   0645  02/09/19   0636  02/10/19   0540   NA  143  142  144   K  3.6  4.8  4.8   CL  111*  109*  108*   CO2  22  24  29   BUN  39*  43*  41*   CREATININE  0.8  0.7  0.6   GLUCOSE  211*  162*  132*   CALCIUM  8.8  8.7  8.5*       Recent Labs      02/08/19   0645  02/09/19   0636  02/10/19   0540   WBC  12.5*  19.2*  15.9*   RBC  3.54  3.77  3.84   HGB  11.0*  11.5  11.3*   HCT  34.9  38.0  38.3   MCV  98.6  100.8*  99.7   MCH  31.1  30.5  29.4   MCHC  31.5*  30.3*  29.5*   RDW  16.5*  16.6*  16.4*   PLT  251  256  234   MPV  10.0  10.0  10.5     WBC 15.9      Radiology:   XR CHEST PORTABLE   Final Result   Findings compatible with congestive heart failure, this   is unchanged from previous. .   Findings compatible with atherosclerotic disease of the aorta. Stable endotracheal tube, NG tube and right central venous catheter   placement   Stable bilateral pleural effusion      The exam has been dictated and signed by VICENTE Esquivel MD, Radiologist, have reviewed the images and   report and concur with these findings. XR CHEST PORTABLE   Final Result   Stable endotracheal tube, NG tube and right internal jugular central   venous catheter placement. Stable bilateral pleural effusions. Tortuous and ectatic aorta. Pulmonary vascular congestion consistent with fluid overload         The exam has been dictated and signed by VICENTE Esquivel MD, Radiologist, have reviewed the images and   report and concur with these findings.       XR CHEST

## 2019-02-10 NOTE — PLAN OF CARE
Problem: Risk for Impaired Skin Integrity  Goal: Tissue integrity - skin and mucous membranes  Structural intactness and normal physiological function of skin and  mucous membranes.    Outcome: Met This Shift      Problem: Falls - Risk of:  Goal: Will remain free from falls  Will remain free from falls   Outcome: Met This Shift    Goal: Absence of physical injury  Absence of physical injury   Outcome: Met This Shift      Problem: Nutrition  Goal: Optimal nutrition therapy  Outcome: Met This Shift

## 2019-02-10 NOTE — PROGRESS NOTES
Date: 2/10/2019    Time: 1:47 PM    Patient Placed On BIPAP/CPAP/ Non-Invasive Ventilation? Yes    If no must comment. Facial area red/color change? No           If YES are Blister/Lesion present? No   If yes must notify nursing staff  BIPAP/CPAP skin barrier?   Yes    Skin barrier type:Liquicel       Comments:        Bernardo Serrano

## 2019-02-10 NOTE — PATIENT CARE CONFERENCE
Intensive Care Daily Quality Rounding Checklist        ICU Team Members:      ICU Day #: NUMBER: 5     Intubation Date: February 6     Ventilator Day #: NUMBER: 5     Central Line Insertion Date: February 6                                                    Day #: NUMBER: 5      Arterial Line Insertion Date: N/A                             Day #: N/A     DVT Prophylaxis: Lovenox    GI Prophylaxis: Protonix     Skin Issues/ Wounds and ordered treatment discussed on rounds: no issues     Goals/ Plans for the Day: WEAN VENT, ABG THIS AM

## 2019-02-10 NOTE — PROGRESS NOTES
DAILY VENTILATOR WEANING ASSESSMENT PERFORMED    P/FIO2 Ratio = 202         (<100= do not Wean)                  Cs =   35                       (<32= Instability)  Plat. Pressure = 10  MV = 11.6  RSBI =    Instabilities:       Cardiovascular =       CNS =       Respiratory =       Metabolic =    Parameters    no    Wean per protocol  yes    Ask Physician for a weaning plan yes    Additional Comments:   Patient switched to PS 8 at approx. 0720 am. Tolerating well. Performed by Steven Screen RRT      Reference Table:    Cardiovascular     CNS      1. Mean BP less than or equal to 75   1. Neuromuscular blockade  2. Heart Rate greater than 130   2. RASS of -3, -4, -5  3. Myocardial Ischemia    3. RASS of +3, +4  4. Mechanical Assist Device    4. ICP greater than 15 or             Intracranial Hypertension         Respiratory      Metabolic  1. PEEP equal to or greater than 10cm/H20  1. Temp. (8hrs) less than 95 or > 103  2. Respiratory Rate greater than 35   2. WBC < 5000 or > 84917  3. Minute Volume greater than 15L  4. pH less than 7.30  5.  Deteriorating chest X-ray

## 2019-02-10 NOTE — PLAN OF CARE
Problem: Risk for Impaired Skin Integrity  Goal: Tissue integrity - skin and mucous membranes  Structural intactness and normal physiological function of skin and  mucous membranes.    Outcome: Met This Shift      Problem: Falls - Risk of:  Goal: Will remain free from falls  Will remain free from falls   Outcome: Met This Shift    Goal: Absence of physical injury  Absence of physical injury   Outcome: Met This Shift      Problem: Restraint Use - Nonviolent/Non-Self-Destructive Behavior:  Goal: Absence of restraint indications  Absence of restraint indications   Outcome: Not Met This Shift    Goal: Absence of restraint-related injury  Absence of restraint-related injury   Outcome: Met This Shift      Problem: Nutrition  Goal: Optimal nutrition therapy  Outcome: Met This Shift

## 2019-02-11 NOTE — PROGRESS NOTES
2/11/2019  1:23 PM           Nutrition Assessment (Enteral Nutrition)    Type and Reason for Visit: Reassess    Nutrition Recommendations: Continue current TF, until pt is able to tolerate PO per SLP recommendations after Swallow Eval    Nutrition Assessment: Pt nutrition status improving, as she is currently extubated but requiring AVAPS. NGT in place for TF support. Recommend continue TF, until able to tolerate AVAPS off and swallow eval complete. Malnutrition Assessment:  · Malnutrition Status: At risk for malnutrition  · Context: Acute illness or injury  · Findings of the 6 clinical characteristics of malnutrition (Minimum of 2 out of 6 clinical characteristics is required to make the diagnosis of moderate or severe Protein Calorie Malnutrition based on AND/ASPEN Guidelines):  1. Energy Intake- (meets needs with TF),      2. Weight Loss-No significant weight loss,    3. Fat Loss-No significant subcutaneous fat loss,    4. Muscle Loss-No significant muscle mass loss,    5. Fluid Accumulation-No significant fluid accumulation,    6.  Strength-Not measured    Nutrition Risk Level:  Moderate    Nutrition Needs:  · Estimated Daily Total Kcal:  (MSJ x 1.25-1.35)  · Estimated Daily Protein (g): 55-65 (1.2-1.4 g/kg IBW)  · Estimated Daily Fluid (ml/day):  (1 ml/kcal)    Nutrition Diagnosis:   · Problem: Inadequate oral intake  · Etiology: related to Impaired respiratory function-inability to consume food (AVAPS after extubated)     Signs and symptoms:  as evidenced by NPO status due to medical condition, Nutrition support - EN    Objective Information:  · Nutrition-Focused Physical Findings: NGT in place for TF, more alert/sedation weaned/off, soft abdomen with +BS, +I/O, trace edema  · Wound Type:  (Dry, scaly and flaky skin)  · Current Nutrition Therapies:  · Oral Diet Orders: NPO   · Tube Feeding (TF) Orders:   · Feeding Route: Nasogastric  · Formula: Standard without Fiber  · Rate (ml/hr):50

## 2019-02-11 NOTE — PROGRESS NOTES
78 yo with COPD, CHF, hypertension admitted with pneumonia and acute respiratory failure. Initially intubated. Extubated 2/10/19 but required AVAPS post extubation    CC/Overnight events:   Comfortable with breathing on NIV (AVAPS). + cough, minimal and nonproductive. States tolerates coming off AVAPS RX for oral care. She is also tolerating AVAPS well.     Current Facility-Administered Medications   Medication Dose Route Frequency Provider Last Rate Last Dose    QUEtiapine (SEROQUEL) tablet 25 mg  25 mg Oral BID Herreraelias Crockerjessa Harry, DO   25 mg at 02/11/19 0931    [START ON 2/12/2019] methylPREDNISolone sodium (SOLU-MEDROL) injection 40 mg  40 mg Intravenous Daily Tsering Mena MD        acetaminophen (TYLENOL) tablet 650 mg  650 mg Oral Q4H PRN Leeland Simms, DO   650 mg at 02/11/19 1053    hydrALAZINE (APRESOLINE) injection 10 mg  10 mg Intravenous Q6H PRN [facility]   10 mg at 02/10/19 0645    metoprolol tartrate (LOPRESSOR) tablet 25 mg  25 mg Oral BID Lanaganland Simms, DO   25 mg at 02/11/19 0844    dexmedetomidine (PRECEDEX) 400 mcg in sodium chloride 0.9 % 100 mL infusion  0.2 mcg/kg/hr Intravenous Continuous Leeland Simms, DO   Stopped at 02/11/19 7100    LORazepam (ATIVAN) injection 1 mg  1 mg Intravenous Q4H PRN [place] Simms, DO   1 mg at 02/10/19 0757    insulin lispro (HUMALOG) injection vial 0-12 Units  0-12 Units Subcutaneous Q6H Shea Tinoco MD   2 Units at 02/11/19 0545    medicated lip balm (BLISTEX/CARMEX) stick   Topical PRN Shea Tinoco MD        cefTRIAXone (ROCEPHIN) 1 g in dextrose 5 % 50 mL IVPB (vial-mate)  1 g Intravenous Q24H Tsering Mena MD   Stopped at 02/11/19 0715    glucose (GLUTOSE) 40 % oral gel 15 g  15 g Oral PRN Leeland Simms, DO        dextrose 50 % solution 12.5 g  12.5 g Intravenous PRN Leeland Simms, DO        glucagon (rDNA) injection 1 mg  1 mg Intramuscular PRN Leeland Simms, DO        dextrose 5 % solution  100 mL/hr Intravenous PRN Nia Mcbride Piero, DO        ipratropium-albuterol (DUONEB) nebulizer solution 1 ampule  1 ampule Inhalation Q4H WA Ca DO Eliu   1 ampule at 02/11/19 1134    enoxaparin (LOVENOX) injection 40 mg  40 mg Subcutaneous Daily Emilee Menchaca MD   40 mg at 02/11/19 0844    perflutren lipid microspheres (DEFINITY) injection 1.65 mg  1.5 mL Intravenous ONCE PRN Emilee Menchaca MD           Objective:   BP (!) 161/92   Pulse 101   Temp 98.6 °F (37 °C) (Core)   Resp 17   Ht 5' (1.524 m)   Wt 129 lb 3 oz (58.6 kg)   SpO2 96%   BMI 25.23 kg/m²     Intake/Output Summary (Last 24 hours) at 02/11/19 1259  Last data filed at 02/11/19 1200   Gross per 24 hour   Intake          1675.17 ml   Output             1825 ml   Net          -149.83 ml       Vent Information  $Ventilation: $Subsequent Day  Ventilator Started: Yes  Vent Type: 840  Vent Mode: PS  Vt Ordered: 400 mL  Rate Set: 0 bmp  Peak Flow: 70 L/min  Pressure Support: 8 cmH20  FiO2 : 40 %  Sensitivity: 1  PEEP/CPAP: 5  I Time/ I Time %: 0 s  Cuff Pressure (cm H2O): 29 cm H2O  Humidification Source: Heated wire  Humidification Temp: 37  Humidification Temp Measured: 38  Circuit Condensation: Drained    WDWN female in no acute distress  Skin: warm, dry, without rash - multiple ecchymoses  HEENT: PERRL, face symmetric, normal dry oral mucosa. No neck mass, adenopathy, or thyromegaly  Chest: symmetric with equal air entry. Lungs: decreased breath sounds without rales, rhonchi, or wheezes  Cardiac: normal PMI, S1, S2 normal.  No murmur or lloyd  Abdomen: rounded, soft, non-tender, active bowel sounds. No mass or hepatosplenomegaly  Extremities: No clubbing, cyanosis, or edema  Neuro: CN II - XII grossly intact. Alert, moves all extremities symmetrically.  Appropriate and interactive    CBC with Differential:  Lab Results   Component Value Date    WBC 19.1 02/11/2019    RBC 3.87 02/11/2019    HGB 11.6 02/11/2019    HCT 38.2 02/11/2019     02/11/2019    MCV 98.7

## 2019-02-11 NOTE — PROGRESS NOTES
This note also relates to the following rows which could not be included:  Resp - Cannot attach notes to unvalidated device data       02/10/19 2341   NIV Type   Mode AVAPS   Mask Type Full face mask   Mask Size Small   Settings/Measurements   Comfort Level Good   Using Accessory Muscles No   EPAP 5 cmH2O   Vt Ordered 400 mL   Rate Ordered 12   SpO2 94   FiO2  40 %   Vt Exhaled 437 mL   Mask Leak (lpm) 42 lpm   Skin Protection for O2 Device Yes   Date: 2/10/2019    Time: 11:42 PM    Patient Placed On BIPAP/CPAP/ Non-Invasive Ventilation? Yes    If no must comment. Facial area red/color change? No           If YES are Blister/Lesion present? No   If yes must notify nursing staff  BIPAP/CPAP skin barrier?   Yes    Skin barrier type:Liquicel       Comments:        Reena Dennison

## 2019-02-11 NOTE — PATIENT CARE CONFERENCE
Intensive Care Daily Quality Rounding Checklist        ICU Team Members: Dr. Manda Abbasi, Elizabeth Frank, clinical pharmacist, charge nurse, bedside nurse     ICU Day #: NUMBER: 6     Intubation Date: N/A     Ventilator Day #: N/A     Central Line Insertion Date: [place]                             Day #: NUMBER: 6      Arterial Line Insertion Date: N/A                             Day #: N/A     DVT Prophylaxis: Lovenox    GI Prophylaxis: Protonix     Skin Issues/ Wounds and ordered treatment discussed on rounds: no issues     Goals/ Plans for the Day: Daily labs, wean Precedex as able, decrease steroids, start Seroquel, decrease O2 as able, AVAPS QHS and as needed

## 2019-02-11 NOTE — PROGRESS NOTES
Epinephrine  CENTRAL LINES:     [] No                   [x] Yes   (Date of Insertion:   )           If yes -     [x] Right IJ     [] Left IJ [] Right Femoral            [] Left Femoral day 4                   [] Right Subclavian   [] Left Subclavian                      [] PICC Line  TURNER'S CATHETER:   [] No              [x] Yes  (Date of Insertion:   )   URINE OUTPUT:            [x] Good                     [] Low              [] Anuric     OBJECTIVE:     VITAL SIGNS:  BP (!) 163/94   Pulse 98   Temp 98.4 °F (36.9 °C) (Core)   Resp 18   Ht 5' (1.524 m)   Wt 129 lb 3 oz (58.6 kg)   SpO2 100%   BMI 25.23 kg/m²   Tmax over 24 hours:  Temp (24hrs), Av.4 °F (36.9 °C), Min:97.9 °F (36.6 °C), Max:98.8 °F (37.1 °C)      Patient Vitals for the past 6 hrs:   BP Temp Temp src Pulse Resp SpO2 Weight   19 0600 (!) 163/94 - - 98 18 100 % -   19 0500 (!) 150/90 - - 86 18 - -   19 0400 130/73 98.4 °F (36.9 °C) CORE 95 21 100 % 129 lb 3 oz (58.6 kg)   19 0345 - - - - 17 - -   19 0300 (!) 175/104 - - 89 15 96 % -   19 0100 (!) 150/97 - - 86 20 100 % -         Intake/Output Summary (Last 24 hours) at 19 0645  Last data filed at 19 0600   Gross per 24 hour   Intake          1675.17 ml   Output             1780 ml   Net          -104.83 ml     Wt Readings from Last 2 Encounters:   19 129 lb 3 oz (58.6 kg)   18 121 lb (54.9 kg)     Body mass index is 25.23 kg/m².         PHYSICAL EXAMINATION:    General appearance - Sedated and in no distress  Mental status - sedated   Eyes - pupils equal and reactive, extraocular eye movements intact  Ears - external ear canals normal  Nose - normal and patent,, discharge oral endotracheal tube or G-tube  Mouth - mucous membranes moist, pharynx normal without lesions  Neck - supple, no significant adenopathy, JVD  Chest - clear to auscultation, no wheezes, rales or rhonchi, symmetric air entry  Heart - normal rate, regular rhythm, normal S1, S2, no murmurs, rubs, clicks or gallops  Abdomen - soft, nontender, nondistended, no masses or organomegaly  Neurological - sedated no focal findings or movement disorder noted  Extremities - peripheral pulses normal, no pedal edema, no clubbing or cyanosis  Skin - normal coloration and turgor, no rashes, no suspicious skin lesions noted        MEDICATIONS:    Scheduled Meds:   metoprolol tartrate  25 mg Oral BID    methylPREDNISolone  40 mg Intravenous Q12H    insulin lispro  0-12 Units Subcutaneous Q6H    chlorhexidine  15 mL Mouth/Throat BID    pantoprazole  40 mg Intravenous Daily    And    sodium chloride (PF)  10 mL Intravenous Daily    cefTRIAXone (ROCEPHIN) IV  1 g Intravenous Q24H    azithromycin  500 mg Intravenous Q24H    ipratropium-albuterol  1 ampule Inhalation Q4H WA    enoxaparin  40 mg Subcutaneous Daily     Continuous Infusions:   dexmedetomidine (PRECEDEX) IV infusion 0.4 mcg/kg/hr (02/11/19 0238)    dextrose       PRN Meds:     hydrALAZINE 10 mg Q6H PRN   LORazepam 1 mg Q4H PRN   medicated lip balm  PRN   glucose 15 g PRN   dextrose 12.5 g PRN   glucagon (rDNA) 1 mg PRN   dextrose 100 mL/hr PRN   perflutren lipid microspheres 1.5 mL ONCE PRN         VENT SETTINGS (Comprehensive) (if applicable):  Vent Information  $Ventilation: $Subsequent Day  Ventilator Started: Yes  Vent Type: 840  Vent Mode: PS  Vt Ordered: 400 mL  Rate Set: 0 bmp  Peak Flow: 70 L/min  Pressure Support: 8 cmH20  FiO2 : 40 %  Sensitivity: 1  PEEP/CPAP: 5  I Time/ I Time %: 0 s  Cuff Pressure (cm H2O): 29 cm H2O  Humidification Source: Heated wire  Humidification Temp: 37  Humidification Temp Measured: 38  Circuit Condensation: Drained  Additional Respiratory  Assessments  Pulse: 98  Resp: 18  SpO2: 100 %  End Tidal CO2: 38 (%)  Humidification Source: Heated wire  Humidification Temp: 37  Circuit Condensation: Drained  Oral Care: Mouth swabbed, Mouth suctioned, Suction toothette  Subglottic Suction Done?: Yes  Cuff Pressure (cm H2O): 29 cm H2O  ABGs:   Recent Labs      02/11/19   0559   PH  7.415   PCO2  51.3*   PO2  104.4*   HCO3  32.2*   BE  6.4*   O2SAT  97.5       Laboratory findings:  Complete Blood Count:   Recent Labs      02/09/19   0636  02/10/19   0540  02/11/19   0544   WBC  19.2*  15.9*  19.1*   HGB  11.5  11.3*  11.6   HCT  38.0  38.3  38.2   PLT  256  234  287        Last 3 Blood Glucose:   Recent Labs      02/09/19   0636  02/10/19   0540   GLUCOSE  162*  132*        PT/INR:    Lab Results   Component Value Date    PROTIME 14.5 02/06/2019    INR 1.3 02/06/2019     PTT:    Lab Results   Component Value Date    APTT 31.7 02/06/2019       Comprehensive Metabolic Profile:   Recent Labs      02/09/19 0636  02/10/19   0540   NA  142  144   K  4.8  4.8   CL  109*  108*   CO2  24  29   BUN  43*  41*   CREATININE  0.7  0.6   GLUCOSE  162*  132*   CALCIUM  8.7  8.5*   PROT  6.0*  5.5*   LABALBU  2.5*  2.5*   BILITOT  <0.2  <0.2   ALKPHOS  88  80   AST  19  15   ALT  11  12      Magnesium:   Lab Results   Component Value Date    MG 2.3 02/10/2019     Phosphorus:   Lab Results   Component Value Date    PHOS 3.2 02/10/2019     Ionized Calcium: No results found for: CAION     Urinalysis:     Troponin: No results for input(s): TROPONINI in the last 72 hours. Radiology/Imaging:   XR CHEST PORTABLE   Final Result   Findings compatible with congestive heart failure, this   is unchanged from previous. .   Findings compatible with atherosclerotic disease of the aorta. Stable endotracheal tube, NG tube and right central venous catheter   placement   Stable bilateral pleural effusion      The exam has been dictated and signed by Lakia Bill PA-C. Chandu Whaley MD, Radiologist, have reviewed the images and   report and concur with these findings. XR CHEST PORTABLE   Final Result   Stable endotracheal tube, NG tube and right internal jugular central   venous catheter placement.    Stable bilateral pleural effusions. Tortuous and ectatic aorta. Pulmonary vascular congestion consistent with fluid overload         The exam has been dictated and signed by Margaretmary Goltz, PA-C. Fer Hinds MD, Radiologist, have reviewed the images and   report and concur with these findings. XR CHEST PORTABLE   Final Result      XR CHEST PORTABLE   Final Result   COPD with superimposed mild CHF. Right IJ central line without complications. XR CHEST PORTABLE   Final Result   Hazy opacities in the lower hemithoraces suggest developing bilateral basilar    pneumonia. This report has been electronically signed by Michelle Palomino MD.      XR CHEST PORTABLE    (Results Pending)   XR CHEST PORTABLE    (Results Pending)         ASSESSMENT:      23-year-old lady with history of COPD tobacco use hypertension has been having increasing shortness of breath the last 2 days presents to ER on 2/6  With cough being treated Robitussin over-the-counter  patient was turning lethargic and blue.  With respiratory distress        Patient was intubated in ER   Chest film showing basilar infiltrates  Influenza negative  2/7  Hypercapnic on pressors  2/8 off norepinephrine this morning  2/9 attempted to stop fentanyl attempted CPAP trial blood gases showing respiratory acidosis  Patient tachycardic at 120Started on metoprolol     1. Hypercapnic respiratory failure on mechanical ventilator   2. Haemophilus pneumonia on Rocephin  3. COPD exacerbation  4. Chronic back pain   5. History of chronic tension type headaches headaches for which she takes fiorinal regularly up to 3 times a day (opiates and barbiturates + screen)  6. Opiate dependence has been receiving narcotics since 2016  7. 2/6 Echo showing EF 60% RSVP 64  8. Nicotine addiction   9. Elevated proBNP  10.  Hypertension           PLAN:         WEAN PER PROTOCOL:                  [] No                   [] Yes                 [x] N/A  DISCONTINUE ANY LABS: [x] No                   [] Yes  ICU PROPHYLAXIS:  Stress ulcer:  [] PPI Agent                   [] [id number]          [] Sucralfate                   [] Other:  VTE:                [x] Enoxaparin                 [] Unfract. Heparin Subcut                    [] EPC Cuffs  NUTRITION:  [] NPO    [x] Tube Feeding (Specify: )       [] TPN                [] PO (Diet: DIET TUBE FEED CONTINUOUS/CYCLIC NPO; STANDARD WITHOUT FIBER; Nasogastric; Continuous; 20; 50)  HOME MEDICATIONS RECONCILED:        [x] No                               [] Yes  INSULIN DRIP:                                               [x] No                               [] Yes  CONSULTATION NEEDED:                          [x] No                               [] Yes  FAMILY UPDATED:                                       [] No                               [x] Yes  TRANSFER OUT OF ICU:                             [x] No                               [] Yes        Neuro   A and O ×3  -Continue to monitor neuro function. - discontinue presidex.      - Patient has long history of opiate and barbiturate use. - Ativan was ordered for possible seizures secondary to withdrawal.   - Ativan when necessary.     - Seroquel ordered for anxiety  - 25 mg BID.      Respiratory   Respiratory failure  - Secondary to COPD and pneumonia  - Patient is extubated  - on AVAPS  - Continue to monitor vent settings  - DuoNeb nebs every 4 while awake  - Dose steroids initiated.     Acidosis  - Continue to monitor ABGs. - Continue events     Pneumonia  - Rocephin for antibiotic coverage. Azithromycin as well. - Solu-Medrol 40 mg 3 times a day     Wean oxygen as tolerated. Keep O2 sat 90-92%     Cardiovascular   CHF  - Continue to monitor on telemetry  - Consider diuresis     Gastrointestinal   GI prophylaxis with Protonix  Initiate tube feeds.     Swallow eval once AVAPS is d/c'd.      Renal   No acute issues  - Continue to monitor creatinine     Infectious Disease Pneumonia  - Rocephin and azithromycin ordered  - Consider infectious disease consult if needed  Film array pending     Hematology/Oncology   Leukocytosis  - Antibiotics initiated  - Continue to monitor white blood cell count and H&H     Endocrine   Diabetes  - sliding scale  - Continue to monitor     Social/Spiritual/DNR/Other   Full code

## 2019-02-11 NOTE — PROGRESS NOTES
[facility]   Progress Note    Admitting Date and Time: 2/6/2019  3:25 AM  Admit Dx: Hypercapnic respiratory failure (HCC) [J96.92]  Hypercapnic respiratory failure ([address] [J96.92]    Seen for follow up on resp failure    Subjective:  Now s/p extubation  And is on BIPAP ,sob persists but better ,denies CP or abd pain. at bedside & updated. ROS: denies fever, chills, cp, n/v, HA unless stated above.  QUEtiapine  25 mg Oral BID    [START ON 2/12/2019] methylPREDNISolone  40 mg Intravenous Daily    metoprolol tartrate  25 mg Oral BID    insulin lispro  0-12 Units Subcutaneous Q6H    cefTRIAXone (ROCEPHIN) IV  1 g Intravenous Q24H    ipratropium-albuterol  1 ampule Inhalation Q4H WA    enoxaparin  40 mg Subcutaneous Daily       acetaminophen 650 mg Q4H PRN   hydrALAZINE 10 mg Q6H PRN   LORazepam 1 mg Q4H PRN   medicated lip balm  PRN   glucose 15 g PRN   dextrose 12.5 g PRN   glucagon (rDNA) 1 mg PRN   dextrose 100 mL/hr PRN   perflutren lipid microspheres 1.5 mL ONCE PRN        Objective:    BP (!) 161/92   Pulse 101   Temp 98.4 °F (36.9 °C) (Core)   Resp 17   Ht 5' (1.524 m)   Wt 129 lb 3 oz (58.6 kg)   SpO2 98%   BMI 25.23 kg/m²   General Appearance: alert and oriented to person, place and time, well developed and well- nourished, with BIPAP as above ,no overt distress noted.   Skin: warm and dry, no rash or erythema  Head: normocephalic and atraumatic  Eyes: pupils equal, round, and reactive to light, extraocular eye movements intact, conjunctivae normal  Neck: supple and non-tender without mass  Pulmonary/Chest: clear to auscultation bilaterally- decrease BS at both base  Cardiovascular: normal rate, regular rhythm, normal S1 and S2  Abdomen: soft, non-tender, non-distended, normal bowel sounds, no masses or organomegaly  Extremities: no cyanosis, clubbing or edema  Musculoskeletal: normal range of motion, no joint swelling, deformity or tenderness  Neurologic: This report has been electronically signed by Rian Tamayo MD.      XR CHEST PORTABLE    (Results Pending)   XR CHEST PORTABLE    (Results Pending)       Assessment:    Active Problems:    Hypercapnic respiratory failure ([address]  Resolved Problems:    * No resolved hospital problems. *      Plan:  Acute Hypercapnic resp failure  - required vent ,now on BIPAP ,PULM following. Hemophilus Pneumonia - On IV Rocephin + Z max and other supportive rx. Acute COPD exacerbation - on IV Steroids ,Nebs rxs ,Vent support & other supportive rx. Chronic Back  Pain- on supportive rx    Chronic tension Headaches as per hx     Opiate dependence - will     Nicotine dependence -will      HTN - on metoprolol as above. On TF for nutrition     On dvt /GI prophylaxis as above. Full code.         Electronically signed by Xiomara Carolina MD on 2/11/2019 at 11:47 AM

## 2019-02-11 NOTE — PROGRESS NOTES
Date: 2/11/2019    Time: 11:37 AM    Patient Placed On BIPAP/CPAP/ Non-Invasive Ventilation? Yes    If no must comment. Facial area red/color change? No           If YES are Blister/Lesion present? No   If yes must notify nursing staff  BIPAP/CPAP skin barrier?   Yes    Skin barrier type:Liquicel       Comments:        Aileen Valentine

## 2019-02-12 NOTE — PROGRESS NOTES
Critical Care Team: Daily Progress Note         Date and time: 2019 11:05 AM    Patient's name:  Flavia Corea    Medical Record Number: [id number]    Patient's account/billing number: [de-identified]    Patient's YOB: 1950    Age: 77 y.o. Date of Admission: 2019  3:25 AM    Length of stay during current admission: 6    Primary Care Physician: Brody Smart DO    Previous Pulmonary: Christ Reno    Code Status: Full Code    PMH:    Past Medical History:   Diagnosis Date    Chronic back pain     COPD (chronic obstructive pulmonary disease) ([facility] [address]     Headache     Hypertension     Osteoarthritis        PSH:   Past Surgical History:   Procedure Laterality Date     SECTION      HYSTERECTOMY         Reason for ICU admission:   1. Respiratory failure      Subjective:     Events in the past 24 Hours:   1. Tolerated no time off noninvasive ventilation with chronic hypercapnia being noticed  2. Haemophilus influenza, beta-lactamase positive, in the blood.  It is sensitive to ceftriaxone which the patient is receiving      Current ventilation:     [] Ventilator  [x] BIPAP/AVAPS  [] Nasal Cannula [] Room Air      Secretions      Amount:  [] Small [] Moderate  _ Large  [] None  Color:     - White - Colored  - Bloody    Sedation:  RAAS Score:  [] Propofol gtt  [] Fentanyl gtt  [] Ativan gtt   [x] -etomidate    Paralyzed?:  [x] No    [] Yes      Vasopressors:  [x] No    [] Yes    If yes -   [] Levophed       [] Dopamine     [] Vasopressin       [] Dobutamine  [] Phenylephrine         [] Epinephrine    Golden catheter:   [] No   [x] Yes     Urine output:            [x] Good   [] Low              [] Anuric      Objective:     Vital signs:  /64   Pulse 109   Temp 97.7 °F (36.5 °C) (Axillary)   Resp 27   Ht 5' (1.524 m)   Wt 126 lb 5.2 oz (57.3 kg)   SpO2 94%   BMI 24.67 kg/m²   Tmax over 24 hours:  Temp (24hrs), Av.5 °F (36.9 °C), Min:97.7 °F (36.5 glucose 15 g PRN   dextrose 12.5 g PRN   glucagon (rDNA) 1 mg PRN   dextrose 100 mL/hr PRN   perflutren lipid microspheres 1.5 mL ONCE PRN         Vent Settings (Comprehensive) (if applicable): On avaps      ABGs:   Recent Labs      02/12/19   0540   PH  7.433   PCO2  52.2*   PO2  75.0   HCO3  34.1*   BE  8.4*   O2SAT  94.5       Laboratory findings:    Complete Blood Count: Recent Labs      02/10/19   0540  02/11/19   0544  02/12/19   0504   WBC  15.9*  19.1*  13.6*   HGB  11.3*  11.6  10.7*   HCT  38.3  38.2  34.7   PLT  234  287  265        Last 3 Blood Glucose:   Recent Labs      02/10/19   0540  02/11/19   0544  02/12/19   0504   GLUCOSE  132*  156*  146*        PT/INR:    Lab Results   Component Value Date    PROTIME 14.5 02/06/2019    INR 1.3 02/06/2019     PTT:    Lab Results   Component Value Date    APTT 31.7 02/06/2019       Comprehensive Metabolic Profile:   Recent Labs      02/10/19   0540  02/11/19   0544  02/12/19   0504   NA  144  143  141   K  4.8  4.5  3.7   CL  108*  105  100   CO2  29  29  36*   BUN  41*  34*  25*   CREATININE  0.6  0.5  0.5   GLUCOSE  132*  156*  146*   CALCIUM  8.5*  8.2*  7.5*   PROT  5.5*  5.5*  4.6*   LABALBU  2.5*  2.6*  2.4*   BILITOT  <0.2  <0.2  <0.2   ALKPHOS  80  75  61   AST  15  16  18   ALT  12  12  11      Magnesium:   Lab Results   Component Value Date    MG 2.1 02/12/2019     Phosphorus:   Lab Results   Component Value Date    PHOS 3.4 02/12/2019     Ionized Calcium: No results found for: CAION     Urinalysis:     Troponin: No results for input(s): TROPONINI in the last 72 hours.     Microbiology past 24h:    Blood cultures:                 [] None drawn      [] Negative             [x]  Positive (Details: Haemophilus influenza)  Urine Culture:                   [] None drawn      [] Negative             []  Positive (Details:  )  Sputum Culture:               [] None drawn       [] Negative             []  Positive (Details:  )   Endotracheal aspirate:     []

## 2019-02-12 NOTE — PROGRESS NOTES
Date: 2/11/2019    Time: 7:41 PM    Patient Placed On BIPAP/CPAP/ Non-Invasive Ventilation? YesYes    If no must comment. Facial area red/color change? No           If YES are Blister/Lesion present? No   If yes must notify nursing staff  BIPAP/CPAP skin barrier?   Yes    Skin barrier type:Liquicel       Comments:        Paty Goodwin

## 2019-02-12 NOTE — CARE COORDINATION
Met w/ . Discharge planning / transition of care discussed. LTAC choices given. Chose [facility].  Christos @ [facility] notified of referral [facility]

## 2019-02-12 NOTE — PROGRESS NOTES
[facility] Hospitalist   Progress Note    Admitting Date and Time: 2/6/2019  3:25 AM  Admit Dx: Hypercapnic respiratory failure (HCC) [J96.92]  Hypercapnic respiratory failure ([address] [J96.92]    Seen for follow up on resp failure    Subjective: On BIPAP ,sob persists but better ,denies CP or abd pain. D/w bedside RN . She was off of BIPAP but did not tolerate so  Now placed on BIPAP & steroids increased. ROS: denies fever, chills, cp, n/v, HA unless stated above.  QUEtiapine  25 mg Oral BID    methylPREDNISolone  40 mg Intravenous Daily    metoprolol tartrate  25 mg Oral BID    insulin lispro  0-12 Units Subcutaneous Q6H    ipratropium-albuterol  1 ampule Inhalation Q4H WA    enoxaparin  40 mg Subcutaneous Daily       acetaminophen 650 mg Q4H PRN   hydrALAZINE 10 mg Q6H PRN   LORazepam 1 mg Q4H PRN   medicated lip balm  PRN   glucose 15 g PRN   dextrose 12.5 g PRN   glucagon (rDNA) 1 mg PRN   dextrose 100 mL/hr PRN   perflutren lipid microspheres 1.5 mL ONCE PRN        Objective:    /63   Pulse 96   Temp 98.1 °F (36.7 °C) (Core)   Resp 21   Ht 5' (1.524 m)   Wt 126 lb 5.2 oz (57.3 kg)   SpO2 99%   BMI 24.67 kg/m²   General Appearance: alert and oriented to person, place and time, well developed and well- nourished, with BIPAP as above ,no overt distress noted.   Skin: warm and dry, no rash or erythema  Head: normocephalic and atraumatic  Eyes: pupils equal, round, and reactive to light, extraocular eye movements intact, conjunctivae normal  Neck: supple and non-tender without mass  Pulmonary/Chest: clear to auscultation bilaterally- decrease BS at both base  Cardiovascular: normal rate, regular rhythm, normal S1 and S2  Abdomen: soft, non-tender, non-distended, normal bowel sounds, no masses or organomegaly  Extremities: no cyanosis, clubbing or edema  Musculoskeletal: normal range of motion, no joint swelling, deformity or tenderness  Neurologic: reflexes normal and symmetric, no cranial nerve deficit, gait, coordination and speech normal      Recent Labs      02/10/19   0540  02/11/19   0544  02/12/19   0504   NA  144  143  141   K  4.8  4.5  3.7   CL  108*  105  100   CO2  29  29  36*   BUN  41*  34*  25*   CREATININE  0.6  0.5  0.5   GLUCOSE  132*  156*  146*   CALCIUM  8.5*  8.2*  7.5*       Recent Labs      02/10/19   0540  02/11/19   0544  02/12/19   0504   WBC  15.9*  19.1*  13.6*   RBC  3.84  3.87  3.51   HGB  11.3*  11.6  10.7*   HCT  38.3  38.2  34.7   MCV  99.7  98.7  98.9   MCH  29.4  30.0  30.5   MCHC  29.5*  30.4*  30.8*   RDW  16.4*  16.1*  15.9*   PLT  234  287  265   MPV  10.5  10.4  10.2       Labs and images reviewed    Radiology:   XR CHEST PORTABLE   Final Result   Findings compatible with congestive heart failure. Bilateral pleural effusions. Atherosclerotic disease of the aorta. The chest does not appear to be significantly changed in the interval      The exam has been dictated and signed by VICENTE Mohan MD, Radiologist, have reviewed the images and   report and concur with these findings. XR CHEST PORTABLE   Final Result   Findings compatible with congestive heart failure, this   is unchanged from previous. .   Findings compatible with atherosclerotic disease of the aorta. Stable endotracheal tube, NG tube and right central venous catheter   placement   Stable bilateral pleural effusion      The exam has been dictated and signed by VICENTE Rodriguez MD, Radiologist, have reviewed the images and   report and concur with these findings. XR CHEST PORTABLE   Final Result   Stable endotracheal tube, NG tube and right internal jugular central   venous catheter placement. Stable bilateral pleural effusions. Tortuous and ectatic aorta. Pulmonary vascular congestion consistent with fluid overload         The exam has been dictated and signed by Pat Long PA-C.     [facility] DEBBIE, Radiologist, have reviewed the images and   report and concur with these findings. XR CHEST PORTABLE   Final Result      XR CHEST PORTABLE   Final Result   COPD with superimposed mild CHF. Right IJ central line without complications. XR CHEST PORTABLE   Final Result   Hazy opacities in the lower hemithoraces suggest developing bilateral basilar    pneumonia. This report has been electronically signed by Diego Gonsalez MD.      XR CHEST PORTABLE    (Results Pending)   XR CHEST PORTABLE    (Results Pending)       Assessment:    Active Problems:    Hypercapnic respiratory failure ([address]  Resolved Problems:    * No resolved hospital problems. *      Plan:  Acute Hypercapnic resp failure  - required vent ,now on BIPAP ,PULM following. As per pulm try NIV breaks & if not feasible then might need reintubation /trach. Hemophilus Pneumonia - On IV Rocephin + Z max and other supportive rx. Acute COPD exacerbation - on IV Steroids ,Nebs rxs ,BIPAP support  & other supportive rx. Chronic Back  Pain- on supportive rx    Chronic tension Headaches as per hx     Opiate dependence - will     Nicotine dependence -will      HTN - on metoprolol as above. On TF for nutrition     On dvt /GI prophylaxis as above. Full code. For documentation No sepsis  Suspected ,has localized infection CAP as mentioned.   Electronically signed by Jaren Cintron MD on 2/12/2019 at 2:05 PM

## 2019-02-12 NOTE — CARE COORDINATION
Social Work/Discharge Planning:  Met with patient and her  Gregoria Cordoba and completed initial assessment. Explained Social Work role and discussed transition of care/discharge planning. Patient lives with her  in a two story house but stays on the first floor. PTA patient independent with no adaptive device. Patient has a walker at home. Patient  denies any history with hhc or snf. Patient PCP is Dr. Isabella Trotter and she uses [facility] in [place]. Patient  denies any home care needs at this time. Will continue to follow and assist with discharge planning.   Electronically signed by MARILU Garcia on 2/12/2019 at 9:34 AM

## 2019-02-12 NOTE — PROGRESS NOTES
DO   1 ampule at 02/12/19 1119    enoxaparin (LOVENOX) injection 40 mg  40 mg Subcutaneous Daily Guy Hurt MD   40 mg at 02/12/19 0910    perflutren lipid microspheres (DEFINITY) injection 1.65 mg  1.5 mL Intravenous ONCE PRN Guy Hurt MD           Objective:   /63   Pulse 96   Temp 98.1 °F (36.7 °C) (Core)   Resp 21   Ht 5' (1.524 m)   Wt 126 lb 5.2 oz (57.3 kg)   SpO2 99%   BMI 24.67 kg/m²     Intake/Output Summary (Last 24 hours) at 02/12/19 1347  Last data filed at 02/12/19 0400   Gross per 24 hour   Intake             1706 ml   Output             1350 ml   Net              356 ml       Vent Information  $Ventilation: $Subsequent Day  Ventilator Started: Yes  Vent Type: 840  Vent Mode: PS  Vt Ordered: 400 mL  Rate Set: 0 bmp  Peak Flow: 70 L/min  Pressure Support: 8 cmH20  FiO2 : 40 %  Sensitivity: 1  PEEP/CPAP: 5  I Time/ I Time %: 0 s  Cuff Pressure (cm H2O): 29 cm H2O  Humidification Source: Heated wire  Humidification Temp: 37  Humidification Temp Measured: 38  Circuit Condensation: Drained    WDWN female in no acute distress while on NIV  Skin: warm, dry, without rash - multiple ecchymoses  HEENT: PERRL, face symmetric, normal dry oral mucosa. No neck mass, adenopathy, or thyromegaly  Chest: symmetric with equal air entry. R TLC  Lungs: decreased breath sounds without rales, rhonchi, or wheezes  Cardiac: normal PMI, S1, S2 normal.  No murmur or lloyd  Abdomen: rounded, soft, non-tender, active bowel sounds. No mass or hepatosplenomegaly  Extremities: No clubbing, cyanosis, or edema  Neuro: CN II - XII grossly intact. Alert, moves all extremities symmetrically.  Appropriate and interactive    CBC with Differential:    Lab Results   Component Value Date    WBC 13.6 02/12/2019    RBC 3.51 02/12/2019    HGB 10.7 02/12/2019    HCT 34.7 02/12/2019     02/12/2019    MCV 98.9 02/12/2019    MCH 30.5 02/12/2019    MCHC 30.8 02/12/2019    RDW 15.9 02/12/2019    NRBC 2.0 02/10/2019

## 2019-02-12 NOTE — CONSULTS
Palliative Care Department  Palliative Care Initial Consult  Provider: S[facility]ines [facility] days prior to Consult: Hospital Day: 7    Referring Provider:  Krystal Lira MD    Reason for Consult:  [x]  Code status Discussion  [x]  Assist with goals of care  []  Psychosocial support  []  Symptom Management      Chief Complaint: Shortness of breath    Subjective:     HPI:  Jimenez Gill is a 77 y.o. female with significant past medical history of COPD, hypertension, chronic headaches on fiorinal who presented to the emergency department with shortness of breath. ABGs showed hypercapnic acidosis, she was very lethargic and due to these findings she was intubated. She was admitted to the critical care unit with acute hypercapnic respiratory failure, COPD exacerbation, pneumonia, lactic acidosis, hypokalemia and sepsis. She was extubated  however required Bipap/AVAPS. She is being treated for Haemophilus influenza with ceftriaxone. Despite these treatments she has not been able to be weaned from BiPAP. Palliative medicine was consult to discuss goals of care and code status. Patient sleeping, no family at bedside    Goals of care: to be determined    Code Status: full code    Advanced Directives: none    Surrogate/Legal NOK: Spouse  Contacts:  Carmen Jason [phone]    Spiritual assessment: No spiritual distress identified     Bereavement and grief: to be determined    Past Medical History:   Diagnosis Date    Chronic back pain     COPD (chronic obstructive pulmonary disease) ([address]     Headache     Hypertension     Osteoarthritis        Past Surgical History:   Procedure Laterality Date     SECTION      HYSTERECTOMY       No family history on file.     Social history:  [place] status: no  Marital status:   Living status: with family:  spouse   Work history: [employer]    History obtain from EMR    Current Medications:  Inpatient medications reviewed: yes  Home Medications reviewed: yes    Allergies   Allergen Reactions    Sulfa Antibiotics Hives       Review of Systems:  Patient sleeping, not obtained currently    Objective:   PHYSICAL EXAM:   Vitals:    /74   Pulse 92   Temp 98.6 °F (37 °C) (Core)   Resp 20   Ht 5' (1.524 m)   Wt 126 lb 5.2 oz (57.3 kg)   SpO2 97%   BMI 24.67 kg/m²   Gen: ill-appearing, thin, Bipap, NAD  HEENT: normocephalic, atraumatic  Lungs: Respirations easy nonlabored, BiPAP   Heart: Pulse regular regular rate and rhythm  Abdomen: ?last BM    Results/Verification of Data Review  Objective data reviewed: labs, images, records, medication use, vitals and chart    Assessment/Plan      Active Hospital Problems    Diagnosis Date Noted    Hypercapnic respiratory failure ([facility] [address] [J96.92] 02/06/2019       Pneumonia- respiratory failure requiring intubation and continued BiPAP, COPD exacerbation:  - Continue treatment per ICU    -  Evaluation for LTAC    Symptom Management:Per ICU    Palliative Care Encounter/Recommendations:  Patient currently sleeping. Reviewed patient's case with floor staff. Patient's  left about an hour ago. Advised he would likely be returning in the morning. Family Meeting:  Participants:No family meeting held today    Discharge planning: to be determined    Patient meets criteria for general inpatient hospice care including the following: N/A- Palliative Care Patient    Data in Support of Terminal Illness:N/A Palliative Patient. Discussed patient and the plan of care with the other IDT members of Palliative Care and Dr. Landen Bran, and with floor nurse. Referrals to: none today    Time/Communication  Greater than 51% of time spent, total 30 minutes in counseling and coordination of care at the bedside regarding goals of care, symptom management, diagnosis and prognosis and see above.     Georgette Almonte PA-C  Palliative Medicine    Thank you for allowing Palliative Medicine to participate in the care of Tamara MERLIN

## 2019-02-12 NOTE — CARE COORDINATION
Social Work/Discharge Planning:  Social Work consult noted for to be.  Kristofer Rodriguez made referral to [facility] at [facility] [employer] per patient  preference. [facility] started pre-cert. Will continue to follow and wait for pre-cert.   Electronically signed by MARILU Guzman on 2/12/2019 at 4:35 PM

## 2019-02-12 NOTE — PROGRESS NOTES
Intensive Care Daily Quality Rounding Checklist      ICU Team Members: rt/tl/bedside nurse/pharmacy/proia/residents    ICU Day #: NUMBER: 7    Intubation Date: extubated 2/10    Ventilator Day #:     Central Line Insertion Date: February 6        Day #: NUMBER: 7     Arterial Line Insertion Date: N/A      Day #: N/A    DVT Prophylaxis: lovenox    GI Prophylaxis: protonix    Skin Issues/ Wounds and ordered treatment discussed on rounds: Y     Goals/ Plans for the Day: daily labs, wean precedex as able, wean AVAPS as able

## 2019-02-12 NOTE — PROGRESS NOTES
Critical Care Team - Daily Progress Note         Date and time: 2/12/2019 7:43 AM  Patient's name:  Wesley Muhammad  Medical Record Number: [id number]  Patient's account/billing number: [de-identified]  Patient's YOB: 1950  Age: 77 y.o. Date of Admission: 2/6/2019  3:25 AM  Length of stay during current admission: 6    Primary Care Physician: aMrquis Simon DO  ICU Attending Physician: Dr. Jonn Gómez     Code Status: Full Code     Reason for ICU admission: Respiratory failure*       SUBJECTIVE:   77 y.o. female with significant past medical history of COPD, CHF, hypertension, who presents to the emergency department with soreness of breath. Patient was febrile at home. Had increasing shortness of breath in spite of breathing treatments at home. In the emergency department she was hypotensive and deemed to be septic. She had central line placed and Levophed was initiated. Patient was also intubated secondary to hypercapnia. She denies nausea/vomiting or diarrhea. Patient was transferred to the ICU for further management for septic shock     OVERNIGHT EVENTS:     No acute issues overnight. Patient tolerating AVAPS. Continues to be anxious. Seroquel started. Patient still requiring precidex.      CURRENT VENTILATION STATUS:   [] Ventilator       [] BIPAP            [] Nasal Cannula           [] Room Air   AVAPS  IF INTUBATED, ET TUBE MARKING AT LOWER LIP:       cms   SECRETIONS Amount:         [x] Small  [] Moderate                    [] Large  [x] None               Color:              [] White  [] Colored                      [] Bloody  SEDATION:    RAAS Score:  [] Propofol gtt                 [] Versed gtt                   [] Ativan gtt                    [] No Sedation   Precedex   PARALYZED:             [x] No                               [] Yes  VASOPRESSORS:    [x] No                               [] Yes    If yes - [] Levophed           [] Dopamine             [] Vasopressin       [] Dobutamine  [] Phenylephrine         [] Epinephrine  CENTRAL LINES:     [] No                   [x] Yes   (Date of Insertion:   )           If yes -     [x] Right IJ     [] Left IJ [] Right Femoral            [] Left Femoral day 4                   [] Right Subclavian   [] Left Subclavian                      [] PICC Line  TURNER'S CATHETER:   [] No              [x] Yes  (Date of Insertion:   )   URINE OUTPUT:            [x] Good                     [] Low              [] Anuric     OBJECTIVE:     VITAL SIGNS:  BP 87/63   Pulse 68   Temp 98.1 °F (36.7 °C) (Core)   Resp 15   Ht 5' (1.524 m)   Wt 126 lb 5.2 oz (57.3 kg)   SpO2 98%   BMI 24.67 kg/m²   Tmax over 24 hours:  Temp (24hrs), Av.6 °F (37 °C), Min:98.1 °F (36.7 °C), Max:99 °F (37.2 °C)      Patient Vitals for the past 6 hrs:   BP Temp Temp src Pulse Resp SpO2 Weight   19 0600 87/63 - - 68 15 98 % -   19 0500 87/69 - - 66 16 99 % 126 lb 5.2 oz (57.3 kg)   19 0400 95/67 98.1 °F (36.7 °C) CORE 65 17 99 % -   19 0300 110/71 - - 68 17 98 % -   19 0200 110/79 - - 68 16 99 % -         Intake/Output Summary (Last 24 hours) at 19 0743  Last data filed at 19 0400   Gross per 24 hour   Intake             1736 ml   Output             2020 ml   Net             -284 ml     Wt Readings from Last 2 Encounters:   19 126 lb 5.2 oz (57.3 kg)   18 121 lb (54.9 kg)     Body mass index is 24.67 kg/m².         PHYSICAL EXAMINATION:    General appearance - Sedated and in no distress  Mental status - sedated   Eyes - pupils equal and reactive, extraocular eye movements intact  Ears - external ear canals normal  Nose - normal and patent,, discharge oral endotracheal tube or G-tube  Mouth - mucous membranes moist, pharynx normal without lesions  Neck - supple, no significant adenopathy, JVD  Chest - clear to auscultation, no wheezes, rales or rhonchi, symmetric air entry  Heart - normal rate, regular rhythm, normal S1, S2, no murmurs, rubs, clicks or gallops  Abdomen - soft, nontender, nondistended, no masses or organomegaly  Neurological - sedated no focal findings or movement disorder noted  Extremities - peripheral pulses normal, no pedal edema, no clubbing or cyanosis  Skin - normal coloration and turgor, no rashes, no suspicious skin lesions noted        MEDICATIONS:    Scheduled Meds:   QUEtiapine  25 mg Oral BID    methylPREDNISolone  40 mg Intravenous Daily    metoprolol tartrate  25 mg Oral BID    insulin lispro  0-12 Units Subcutaneous Q6H    ipratropium-albuterol  1 ampule Inhalation Q4H WA    enoxaparin  40 mg Subcutaneous Daily     Continuous Infusions:   dexmedetomidine (PRECEDEX) IV infusion 0.3 mcg/kg/hr (02/12/19 0555)    dextrose       PRN Meds:     acetaminophen 650 mg Q4H PRN   hydrALAZINE 10 mg Q6H PRN   LORazepam 1 mg Q4H PRN   medicated lip balm  PRN   glucose 15 g PRN   dextrose 12.5 g PRN   glucagon (rDNA) 1 mg PRN   dextrose 100 mL/hr PRN   perflutren lipid microspheres 1.5 mL ONCE PRN         VENT SETTINGS (Comprehensive) (if applicable):  Vent Information  $Ventilation: $Subsequent Day  Ventilator Started: Yes  Vent Type: 840  Vent Mode: PS  Vt Ordered: 400 mL  Rate Set: 0 bmp  Peak Flow: 70 L/min  Pressure Support: 8 cmH20  FiO2 : 40 %  Sensitivity: 1  PEEP/CPAP: 5  I Time/ I Time %: 0 s  Cuff Pressure (cm H2O): 29 cm H2O  Humidification Source: Heated wire  Humidification Temp: 37  Humidification Temp Measured: 38  Circuit Condensation: Drained  Additional Respiratory  Assessments  Pulse: 68  Resp: 15  SpO2: 98 %  End Tidal CO2: 38 (%)  Humidification Source: Heated wire  Humidification Temp: 37  Circuit Condensation: Drained  Oral Care: Mouth swabbed, Lip moisturizer applied  Subglottic Suction Done?: Yes  Cuff Pressure (cm H2O): 29 cm H2O  ABGs:   Recent Labs      02/12/19 0540   PH  7.433   PCO2  52.2*   PO2  75.0   HCO3  34.1*   BE  8.4*   O2SAT  94.5       Laboratory findings:  Complete exam has been dictated and signed by Antonella Lewis PA-C. Maxwell Lucero MD, Radiologist, have reviewed the images and   report and concur with these findings. XR CHEST PORTABLE   Final Result   Stable endotracheal tube, NG tube and right internal jugular central   venous catheter placement. Stable bilateral pleural effusions. Tortuous and ectatic aorta. Pulmonary vascular congestion consistent with fluid overload         The exam has been dictated and signed by Antonella Lewis PA-C. Maxwell Lucero MD, Radiologist, have reviewed the images and   report and concur with these findings. XR CHEST PORTABLE   Final Result      XR CHEST PORTABLE   Final Result   COPD with superimposed mild CHF. Right IJ central line without complications. XR CHEST PORTABLE   Final Result   Hazy opacities in the lower hemithoraces suggest developing bilateral basilar    pneumonia. This report has been electronically signed by Jian Espana MD.      XR CHEST PORTABLE    (Results Pending)   XR CHEST PORTABLE    (Results Pending)         ASSESSMENT:      64-year-old lady with history of COPD tobacco use hypertension has been having increasing shortness of breath the last 2 days presents to ER on 2/6  With cough being treated Robitussin over-the-counter  patient was turning lethargic and blue.  With respiratory distress        Patient was intubated in ER   Chest film showing basilar infiltrates  Influenza negative  2/7  Hypercapnic on pressors  2/8 off norepinephrine this morning  2/9 attempted to stop fentanyl attempted CPAP trial blood gases showing respiratory acidosis  Patient tachycardic at 120Started on metoprolol     1. Hypercapnic respiratory failure on mechanical ventilator   2. Haemophilus pneumonia on Rocephin  3. COPD exacerbation  4. Chronic back pain   5.  History of chronic tension type headaches headaches for which she takes fiorinal regularly up to 3 times a day (opiates and

## 2019-02-13 NOTE — PROGRESS NOTES
°F (36.2 °C) (Core)   Resp 15   Ht 5' (1.524 m)   Wt 126 lb 8.7 oz (57.4 kg)   SpO2 100%   BMI 24.71 kg/m²     DERM: toenails 1-5 bilateral are very thick, yellow, dystrophic, crumbly, elongated with subungual debris and very painful to palpation. Skin dry. No open lesions. NEUROLOGIC: soft touch intact. VASCULAR:DP/PT bilateral audible with hand held doppler. MUSCULOSKELETAL: no gross abnormality    Wound Care Documentation:       Labs:  Cultures :   Organism   Date Value Ref Range Status   02/06/2019 Haemophilus influenzae (A)  Final     No results found for: WNDAJANETT      IMPRESSION/RECOMMENDATIONS:    1. Onychomycosis associated with pain. Dermatitis. Toenails debrided in length and thickness to decrease discomfort and prevent such hazards as secondary infection. moisterizer to feet bid, once discharged recommend topical antifungal to toenails daily.       Electronically signed by Violeta Arguelles DPM on 2/13/2019 at 11:52 AM

## 2019-02-13 NOTE — PROGRESS NOTES
that, given medical co-morbidities if she were to have a cardiac arrest, the chances of surviving CPR may be low. We also reviewed that if she survived cardiac arrest, the patient may not return to her prior level of function. We reviewed the options of DNR, and she expresses that she wishes to remain a full code. Advanced Directives: none    Surrogate/Legal NOK: Spouse  Contacts:  Mike Donnelly [phone]    Spiritual assessment: No spiritual distress identified     Bereavement and grief: to be determined    Past Medical History:   Diagnosis Date    Chronic back pain     COPD (chronic obstructive pulmonary disease) ([facility]     Headache     Hypertension     Osteoarthritis        Past Surgical History:   Procedure Laterality Date     SECTION      HYSTERECTOMY       No family history on file.     Social history:   status: no  Marital status:   Living status: with family:  spouse   Work history: [employer]    History obtain from EMR    Current Medications:  Inpatient medications reviewed: yes  Home Medications reviewed: yes    Allergies   Allergen Reactions    Sulfa Antibiotics Hives     Objective:   PHYSICAL EXAM:   Vitals:    /73   Pulse 89   Temp 97.1 °F (36.2 °C) (Core)   Resp 15   Ht 5' (1.524 m)   Wt 126 lb 8.7 oz (57.4 kg)   SpO2 100%   BMI 24.71 kg/m²   Gen: alert, thin, NAD  HEENT: normocephalic, atraumatic  Lungs: CTA bilaterally, Respirations easy nonlabored,   Heart: Pulse regular regular rate and rhythm  Abdomen: soft, non-distended  Neuro:  PERRL, alert and oriented x 4, follows commands    Results/Verification of Data Review  Objective data reviewed: labs, images, records, medication use, vitals and chart    Assessment/Plan      Active Hospital Problems    Diagnosis Date Noted    Hypercapnic respiratory failure ([facility] [J96.92] 2019       COPD exacerbation/Pneumonia with acute respiratory failure:  -  Continue treatment per ICU    -  Evaluation for LTAC    Symptom Management: Per ICU    Palliative Care Encounter/Recommendations:  2/12/19  Patient currently sleeping. Reviewed patient's case with floor staff. Patient's  left about an hour ago. Advised he would likely be returning in the morning. 2/13/19  I met with the patient and her . We reviewed her wishes regaring her goals of care and code status. She wishes to continue with her current care and full code. She denies further immediate PM needs. Will follow along as needed. Family Meeting:  Participants:Spouse and patient    Discharge planning: to be determined: Pursuing LTAC approval    Patient meets criteria for general inpatient hospice care including the following: N/A- Palliative Care Patient    Data in Support of Terminal Illness:N/A Palliative Patient. Discussed patient and the plan of care with the other IDT members of Palliative Care and Dr. Arianna Yun, and with consultants and floor nurse. Referrals to: none today    Time/Communication  Greater than 51% of time spent, total 25 minutes in counseling and coordination of care at the bedside regarding goals of care, symptom management, diagnosis and prognosis and see above. Pawan [facility]  Palliative Medicine    Thank you for allowing Palliative Medicine to participate in the care of Tamara Abraham. Note: This report was completed using computerPlivo voiced recognition software. Every effort has been made to ensure accuracy; however, inadvertent computerized transcription errors may be present.

## 2019-02-13 NOTE — PROGRESS NOTES
Critical Care Team - Daily Progress Note         Date and time: 2/13/2019 8:23 AM  Patient's name:  Jaya Nergon Holiday Record Number: [id number]  Patient's account/billing number: [de-identified]  Patient's YOB: 1950  Age: 77 y.o. Date of Admission: 2/6/2019  3:25 AM  Length of stay during current admission: 7    Primary Care Physician: Chikis Little DO  ICU Attending Physician: Dr. Gonzalo Torres     Code Status: Full Code     Reason for ICU admission: Respiratory failure*       SUBJECTIVE:   OVERNIGHT EVENTS:  No acute issues overnight. Patient is off AVAPS during this encounter. She is tolerating nasal cannula well at this time. We'll continue to reassess. Increase Seroquel for anxiety and discontinue Precedex. 77 y.o. female with significant past medical history of COPD, CHF, hypertension, who presents to the emergency department with soreness of breath. Patient was febrile at home. Had increasing shortness of breath in spite of breathing treatments at home. In the emergency department she was hypotensive and deemed to be septic. She had central line placed and Levophed was initiated. Patient was also intubated secondary to hypercapnia. She denies nausea/vomiting or diarrhea.  Patient was transferred to the ICU for further management for septic shock      CURRENT VENTILATION STATUS:   [] Ventilator       [] BIPAP            [x] Nasal Cannula           [] Room Air   AVAPS  IF INTUBATED, ET TUBE MARKING AT LOWER LIP:       cms   SECRETIONS Amount:         [x] Small  [] Moderate                    [] Large  [x] None               Color:              [] White  [] Colored                      [] Bloody  SEDATION:    RAAS Score:  [] Propofol gtt                 [] Versed gtt                   [] Ativan gtt                    [] No Sedation   Precedex   PARALYZED:             [x] No                               [] Yes  VASOPRESSORS:    [x] No                               [] Yes    If yes - wheezes, rales or rhonchi, symmetric air entry  Heart - normal rate, regular rhythm, normal S1, S2, no murmurs, rubs, clicks or gallops  Abdomen - soft, nontender, nondistended, no masses or organomegaly  Neurological - sedated no focal findings or movement disorder noted  Extremities - peripheral pulses normal, no pedal edema, no clubbing or cyanosis  Skin - normal coloration and turgor, no rashes, no suspicious skin lesions noted        MEDICATIONS:    Scheduled Meds:   methylPREDNISolone  40 mg Intravenous Q8H    formoterol  20 mcg Nebulization BID    sodium chloride flush  10 mL Intravenous BID    QUEtiapine  25 mg Oral BID    metoprolol tartrate  25 mg Oral BID    insulin lispro  0-12 Units Subcutaneous Q6H    ipratropium-albuterol  1 ampule Inhalation Q4H WA    enoxaparin  40 mg Subcutaneous Daily     Continuous Infusions:   dexmedetomidine (PRECEDEX) IV infusion 0.6 mcg/kg/hr (02/12/19 9630)    dextrose       PRN Meds:     acetaminophen 650 mg Q4H PRN   hydrALAZINE 10 mg Q6H PRN   LORazepam 1 mg Q4H PRN   medicated lip balm  PRN   glucose 15 g PRN   dextrose 12.5 g PRN   glucagon (rDNA) 1 mg PRN   dextrose 100 mL/hr PRN   perflutren lipid microspheres 1.5 mL ONCE PRN         VENT SETTINGS (Comprehensive) (if applicable):  Vent Information  $Ventilation: $Subsequent Day  Ventilator Started: Yes  Vent Type: 840  Vent Mode: PS  Vt Ordered: 400 mL  Rate Set: 0 bmp  Peak Flow: 70 L/min  Pressure Support: 8 cmH20  FiO2 : 35 %  Sensitivity: 1  PEEP/CPAP: 5  I Time/ I Time %: 0 s  Cuff Pressure (cm H2O): 29 cm H2O  Humidification Source: Heated wire  Humidification Temp: 37  Humidification Temp Measured: 38  Circuit Condensation: Drained  Additional Respiratory  Assessments  Pulse: 74  Resp: 25  SpO2: 98 %  End Tidal CO2: 38 (%)  Humidification Source: Heated wire  Humidification Temp: 37  Circuit Condensation: Drained  Oral Care: Mouth swabbed, Lip moisturizer applied  Subglottic Suction Done?: Yes  Cuff Pressure (cm H2O): 29 cm H2O  ABGs:   Recent Labs      02/13/19   0556   PH  7.475*   PCO2  43.6   PO2  58.1*   HCO3  31.4*   BE  7.0*   O2SAT  89.3*       Laboratory findings:  Complete Blood Count:   Recent Labs      02/11/19   0544  02/12/19   0504  02/13/19   0522   WBC  19.1*  13.6*  13.6*   HGB  11.6  10.7*  11.5   HCT  38.2  34.7  36.1   PLT  287  265  297        Last 3 Blood Glucose:   Recent Labs      02/11/19   0544  02/12/19   0504  02/13/19   0522   GLUCOSE  156*  146*  181*        PT/INR:    Lab Results   Component Value Date    PROTIME 14.5 02/06/2019    INR 1.3 02/06/2019     PTT:    Lab Results   Component Value Date    APTT 31.7 02/06/2019       Comprehensive Metabolic Profile:   Recent Labs      02/11/19   0544  02/12/19   0504  02/13/19 0522   NA  143  141  141   K  4.5  3.7  4.5   CL  105  100  100   CO2  29  36*  36*   BUN  34*  25*  27*   CREATININE  0.5  0.5  0.4*   GLUCOSE  156*  146*  181*   CALCIUM  8.2*  7.5*  8.0*   PROT  5.5*  4.6*  5.1*   LABALBU  2.6*  2.4*  2.6*   BILITOT  <0.2  <0.2  0.2   ALKPHOS  75  61  65   AST  16  18  16   ALT  12  11  13      Magnesium:   Lab Results   Component Value Date    MG 2.2 02/13/2019     Phosphorus:   Lab Results   Component Value Date    PHOS 3.7 02/13/2019     Ionized Calcium: No results found for: CAION     Urinalysis:     Troponin: No results for input(s): TROPONINI in the last 72 hours. Radiology/Imaging:   XR CHEST PORTABLE   Final Result   Tortuous ectatic aorta   Cardiomegaly    Airspace disease compatible with atelectasis                     XR CHEST PORTABLE   Final Result   Findings compatible with congestive heart failure. Bilateral pleural effusions. Atherosclerotic disease of the aorta. The chest does not appear to be significantly changed in the interval      The exam has been dictated and signed by Karen Philip PA-C.     Inder Vargas MD, Radiologist, have reviewed the images and   report and concur with these metoprolol     1. Hypercapnic respiratory failure on mechanical ventilator   2. Haemophilus pneumonia on Rocephin  3. COPD exacerbation  4. Chronic back pain   5. History of chronic tension type headaches headaches for which she takes fiorinal regularly up to 3 times a day (opiates and barbiturates + screen)  6. Opiate dependence has been receiving narcotics since 2016  7. 2/6 Echo showing EF 60% RSVP 64  8. Nicotine addiction   9. Elevated proBNP  10. Hypertension           PLAN:         WEAN PER PROTOCOL:                  [] No                   [x] Yes                 [] N/A  DISCONTINUE ANY LABS:              [x] No                   [] Yes  ICU PROPHYLAXIS:  Stress ulcer:  [] PPI Agent                   [] [id number]          [] Sucralfate                   [] Other:  VTE:                [x] Enoxaparin                 [] Unfract. Heparin Subcut                    [] EPC Cuffs  NUTRITION:  [] NPO    [x] Tube Feeding (Specify: )       [] TPN                [] PO (Diet: DIET TUBE FEED CONTINUOUS/CYCLIC NPO; STANDARD WITHOUT FIBER; Nasogastric; Continuous; 20; 50)  HOME MEDICATIONS RECONCILED:        [x] No                               [] Yes  INSULIN DRIP:                                               [x] No                               [] Yes  CONSULTATION NEEDED:                          [x] No                               [] Yes  FAMILY UPDATED:                                       [] No                               [x] Yes  TRANSFER OUT OF ICU:                             [x] No                               [] Yes        Neuro   A and O ×3  -Continue to monitor neuro function.  - Discontinue Precedex      - Patient has long history of opiate and barbiturate use.    - Ativan was ordered for possible seizures secondary to withdrawal.   - Ativan when necessary.     - Seroquel ordered for anxiety  - 25 mg BID.   - Increased daily Seroquel to 50 mg twice a day on February 13.     Respiratory   Respiratory failure  - Secondary to COPD and pneumonia  - Patient is extubated  - on AVAPS  - Continue to monitor vent settings  - DuoNeb nebs every 4 while awake  - Dose steroids initiated.     Acidosis  - Continue to monitor ABGs. - Continue events     Pneumonia  - Rocephin for antibiotic coverage. Azithromycin as well. - Solu-Medrol 40 mg 3 times a day    Patient continues to require AVAPS. - Discussed possibility of tube and trach  - Possible LTAC placement     Wean oxygen as tolerated. Keep O2 sat 90-92%     Cardiovascular   CHF  - Continue to monitor on telemetry  - Consider diuresis     Gastrointestinal   GI prophylaxis with Protonix  Initiate tube feeds. Discontinue NG tube  - Advance diet    Swallow eval once AVAPS is d/c'd.      Renal   No acute issues  - Continue to monitor creatinine     Infectious Disease   Pneumonia  - Rocephin and azithromycin ordered  - Consider infectious disease consult if needed  Film array pending     Hematology/Oncology   Leukocytosis  - Antibiotics initiated  - Continue to monitor white blood cell count and H&H     Endocrine   Diabetes  - sliding scale  - Continue to monitor     Social/Spiritual/DNR/Other   Full code  Consult palliative care attending for assessment  - Discussed possible trach/Cely if patient does not tolerate nasal cannula and needs to return to a VATS. - Up to chair  - The patient is able to tolerate nasal cannula and does not need a VATS, consider telemetry transfer.

## 2019-02-13 NOTE — PROGRESS NOTES
Occupational Therapy  Date:2/13/2019  Patient Name: Alexandra Felix  Room: 0206/0206-A     Occupational Therapy (OT) evaluation attempted this afternoon; patient declined participation in out of bed activities due to recently returning to bed. OT evaluation to be re-attempted at later time/date, as able/appropriate. Kaylene Lugo, OTR/L  License Number: [id number]

## 2019-02-13 NOTE — PROGRESS NOTES
Q4H [place] Ashley Gunter, DO   1 ampule at 02/13/19 1124    enoxaparin (LOVENOX) injection 40 mg  40 mg Subcutaneous Daily Jackson Orourke MD   40 mg at 02/13/19 0900    perflutren lipid microspheres (DEFINITY) injection 1.65 mg  1.5 mL Intravenous ONCE PRN Jackson Orourke MD           Objective:   /73   Pulse 89   Temp 97.7 °F (36.5 °C) (Core)   Resp 15   Ht 5' (1.524 m)   Wt 126 lb 8.7 oz (57.4 kg)   SpO2 100%   BMI 24.71 kg/m²     Intake/Output Summary (Last 24 hours) at 02/13/19 1348  Last data filed at 02/13/19 1300   Gross per 24 hour   Intake             2197 ml   Output             1867 ml   Net              330 ml       Vent Information  $Ventilation: $Subsequent Day  Ventilator Started: Yes  Vent Type: 840  Vent Mode: PS  Vt Ordered: 400 mL  Rate Set: 0 bmp  Peak Flow: 70 L/min  Pressure Support: 8 cmH20  FiO2 : 35 %  Sensitivity: 1  PEEP/CPAP: 5  I Time/ I Time %: 0 s  Cuff Pressure (cm H2O): 29 cm H2O  Humidification Source: Heated wire  Humidification Temp: 37  Humidification Temp Measured: 38  Circuit Condensation: Drained    WDWN female in no acute distress, on Precedex gtt  Skin: warm, dry, without rash - multiple ecchymoses  HEENT: PERRL, face symmetric, normal dry oral mucosa. No neck mass, adenopathy, or thyromegaly  Chest: symmetric with equal air entry. R TLC  Lungs: decreased breath sounds without rales, rhonchi, or wheezes  Cardiac: normal PMI, S1, S2 normal.  No murmur or lloyd  Abdomen: rounded, soft, non-tender, active bowel sounds. No mass or hepatosplenomegaly  Extremities: No clubbing, cyanosis, or edema  Neuro: CN II - XII grossly intact. Alert, moves all extremities symmetrically.  Appropriate and interactive    CBC with Differential:    Lab Results   Component Value Date    WBC 13.6 02/13/2019    RBC 3.74 02/13/2019    HGB 11.5 02/13/2019    HCT 36.1 02/13/2019     02/13/2019    MCV 96.5 02/13/2019    MCH 30.7 02/13/2019    MCHC 31.9 02/13/2019    RDW 16.0 02/13/2019 NRBC 2.0 02/10/2019    METASPCT 0.9 02/06/2019    LYMPHOPCT 2.3 02/13/2019    MONOPCT 1.8 02/13/2019    BASOPCT 0.1 02/13/2019    MONOSABS 0.25 02/13/2019    LYMPHSABS 0.31 02/13/2019    EOSABS 0.00 02/13/2019    BASOSABS 0.01 02/13/2019     CMP:    Lab Results   Component Value Date     02/13/2019    K 4.5 02/13/2019     02/13/2019    CO2 36 02/13/2019    BUN 27 02/13/2019    CREATININE 0.4 02/13/2019    GFRAA >60 02/13/2019    LABGLOM >60 02/13/2019    GLUCOSE 181 02/13/2019    GLUCOSE 93 01/31/2011    PROT 5.1 02/13/2019    LABALBU 2.6 02/13/2019    LABALBU 3.8 01/31/2011    CALCIUM 8.0 02/13/2019    BILITOT 0.2 02/13/2019    ALKPHOS 65 02/13/2019    AST 16 02/13/2019    ALT 13 02/13/2019     ABG:    Lab Results   Component Value Date    PH 7.475 02/13/2019    PCO2 43.6 02/13/2019    PO2 58.1 02/13/2019    HCO3 31.4 02/13/2019    BE 7.0 02/13/2019    O2SAT 89.3 02/13/2019         CXR reviewed:   unchanged      Impression:  1. Pneumonia  2. COPD  3. Acute on chronic hypercarbic and hypoxemic respiratory failure - Tolerating NIV    Plans:   1. Completed  Rocephin. 2. Continue Duonebs +LABA  3. Wean O2 as tolerated, down to 6 lts. Wean to keep spo2 90-95%  4. Continue IV steroids  5. Continue  NIV breaks. QHS and prn.   6. Swallowing eval today, hope to pass.

## 2019-02-13 NOTE — PROGRESS NOTES
[facility]   Progress Note    Admitting Date and Time: 2/6/2019  3:25 AM  Admit Dx: Hypercapnic respiratory failure (HCC) [J96.92]  Hypercapnic respiratory failure ([address] [J96.92]    Seen for follow up on resp failure    Subjective: In chair and on O2 6 l Nc ,for swallow evals - says is hungry. No overt distress noted. denies CP or abd pain. D/w Nursing. ROS: denies fever, chills, cp, n/v, HA unless stated above.  methylPREDNISolone  40 mg Intravenous Q8H    formoterol  20 mcg Nebulization BID    sodium chloride flush  10 mL Intravenous BID    QUEtiapine  25 mg Oral BID    metoprolol tartrate  25 mg Oral BID    insulin lispro  0-12 Units Subcutaneous Q6H    ipratropium-albuterol  1 ampule Inhalation Q4H WA    enoxaparin  40 mg Subcutaneous Daily       acetaminophen 650 mg Q4H PRN   hydrALAZINE 10 mg Q6H PRN   LORazepam 1 mg Q4H PRN   medicated lip balm  PRN   glucose 15 g PRN   dextrose 12.5 g PRN   glucagon (rDNA) 1 mg PRN   dextrose 100 mL/hr PRN   perflutren lipid microspheres 1.5 mL ONCE PRN        Objective:    /73   Pulse 89   Temp 97.7 °F (36.5 °C) (Core)   Resp 15   Ht 5' (1.524 m)   Wt 126 lb 8.7 oz (57.4 kg)   SpO2 100%   BMI 24.71 kg/m²   General Appearance: alert and oriented to person, place and time, well developed and well- nourished,On O2 6 L NC  ,no overt distress noted.   Skin: warm and dry, no rash or erythema  Head: normocephalic and atraumatic  Eyes: pupils equal, round, and reactive to light, extraocular eye movements intact, conjunctivae normal  Neck: supple and non-tender without mass  Pulmonary/Chest: decrease BS at both base  Cardiovascular: normal rate, regular rhythm, normal S1 and S2  Abdomen: soft, non-tender, non-distended, normal bowel sounds, no masses or organomegaly  Extremities: no cyanosis, clubbing or edema  Musculoskeletal: normal range of motion, no joint swelling, deformity or tenderness  Neurologic: reflexes normal and symmetric, no cranial nerve deficit, gait, coordination and speech normal      Recent Labs      02/11/19   0544  02/12/19   0504  02/13/19   0522   NA  143  141  141   K  4.5  3.7  4.5   CL  105  100  100   CO2  29  36*  36*   BUN  34*  25*  27*   CREATININE  0.5  0.5  0.4*   GLUCOSE  156*  146*  181*   CALCIUM  8.2*  7.5*  8.0*       Recent Labs      02/11/19   0544  02/12/19   0504  02/13/19   0522   WBC  19.1*  13.6*  13.6*   RBC  3.87  3.51  3.74   HGB  11.6  10.7*  11.5   HCT  38.2  34.7  36.1   MCV  98.7  98.9  96.5   MCH  30.0  30.5  30.7   MCHC  30.4*  30.8*  31.9*   RDW  16.1*  15.9*  16.0*   PLT  287  265  297   MPV  10.4  10.2  10.5       Labs and images reviewed    Radiology:   XR CHEST PORTABLE   Final Result   Tortuous ectatic aorta   Cardiomegaly    Airspace disease compatible with atelectasis                     XR CHEST PORTABLE   Final Result   Findings compatible with congestive heart failure. Bilateral pleural effusions. Atherosclerotic disease of the aorta. The chest does not appear to be significantly changed in the interval      The exam has been dictated and signed by Bernie Torres PA-C. Taye Waite MD, Radiologist, have reviewed the images and   report and concur with these findings. XR CHEST PORTABLE   Final Result   Findings compatible with congestive heart failure, this   is unchanged from previous. .   Findings compatible with atherosclerotic disease of the aorta. Stable endotracheal tube, NG tube and right central venous catheter   placement   Stable bilateral pleural effusion      The exam has been dictated and signed by Nazia Robin PA-C. Taye Waite MD, Radiologist, have reviewed the images and   report and concur with these findings. XR CHEST PORTABLE   Final Result   Stable endotracheal tube, NG tube and right internal jugular central   venous catheter placement. Stable bilateral pleural effusions. Tortuous and ectatic aorta.

## 2019-02-13 NOTE — PROGRESS NOTES
Critical Care Team: Daily Progress Note         Date and time: 2019 11:27 AM    Patient's name:  Niharika Rodríguez    Medical Record Number: [id number]    Patient's account/billing number: [de-identified]    Patient's YOB: 1950    Age: 77 y.o. Date of Admission: 2019  3:25 AM    Length of stay during current admission: 7    Primary Care Physician: Nisha Alcantara DO    Previous Pulmonary: Alaina Swift    Code Status: Full Code    PMH:    Past Medical History:   Diagnosis Date    Chronic back pain     [facility]     Headache     Hypertension     Osteoarthritis        PSH:   Past Surgical History:   Procedure Laterality Date     SECTION      HYSTERECTOMY         Reason for ICU admission:   1. Respiratory failure      Subjective:     Events in the past 24 Hours:   1. Tolerated some time off noninvasive mechanical ventilation this a.m.       Current ventilation:     [] Ventilator  [x] BIPAP/AVAPS  [] Nasal Cannula [] Room Air      Secretions      Amount:  [] Small [] Moderate  _ Large  [] None  Color:     - White - Colored  - Bloody    Sedation:  RAAS Score:  [] Propofol gtt  [] Fentanyl gtt  [] Ativan gtt   [x] -etomidate    Paralyzed?:  [x] No    [] Yes      Vasopressors:  [x] No    [] Yes    If yes -   [] Levophed       [] Dopamine     [] Vasopressin       [] Dobutamine  [] Phenylephrine         [] Epinephrine    Golden catheter:   [] No   [x] Yes     Urine output:            [x] Good   [] Low              [] Anuric      Objective:     Vital signs:  /73   Pulse 89   Temp 97.1 °F (36.2 °C) (Core)   Resp 15   Ht 5' (1.524 m)   Wt 126 lb 8.7 oz (57.4 kg)   SpO2 100%   BMI 24.71 kg/m²   Tmax over 24 hours:  Temp (24hrs), Av.1 °F (36.7 °C), Min:97.1 °F (36.2 °C), Max:99 °F (37.2 °C)      Patient Vitals for the past 6 hrs:   BP Temp Temp src Pulse Resp SpO2   19 1100 113/73 - - 89 15 100 % mL ONCE PRN         Vent Settings (Comprehensive) (if applicable): On avaps      ABGs:   Recent Labs      02/13/19   0556   PH  7.475*   PCO2  43.6   PO2  58.1*   HCO3  31.4*   BE  7.0*   O2SAT  89.3*       Laboratory findings:    Complete Blood Count:   Recent Labs      02/11/19   0544  02/12/19   0504  02/13/19 0522   WBC  19.1*  13.6*  13.6*   HGB  11.6  10.7*  11.5   HCT  38.2  34.7  36.1   PLT  287  265  297        Last 3 Blood Glucose:   Recent Labs      02/11/19   0544  02/12/19   0504  02/13/19   0522   GLUCOSE  156*  146*  181*        PT/INR:    Lab Results   Component Value Date    PROTIME 14.5 02/06/2019    INR 1.3 02/06/2019     PTT:    Lab Results   Component Value Date    APTT 31.7 02/06/2019       Comprehensive Metabolic Profile:   Recent Labs      02/11/19 0544 02/12/19   0504 02/13/19 0522   NA  143  141  141   K  4.5  3.7  4.5   CL  105  100  100   CO2  29  36*  36*   BUN  34*  25*  27*   CREATININE  0.5  0.5  0.4*   GLUCOSE  156*  146*  181*   CALCIUM  8.2*  7.5*  8.0*   PROT  5.5*  4.6*  5.1*   LABALBU  2.6*  2.4*  2.6*   BILITOT  <0.2  <0.2  0.2   ALKPHOS  75  61  65   AST  16  18  16   ALT  12  11  13      Magnesium:   Lab Results   Component Value Date    MG 2.2 02/13/2019     Phosphorus:   Lab Results   Component Value Date    PHOS 3.7 02/13/2019     Ionized Calcium: No results found for: CAION     Urinalysis:     Troponin: No results for input(s): TROPONINI in the last 72 hours.     Microbiology past 24h:    Blood cultures:                 [] None drawn      [] Negative             [x]  Positive (Details: Haemophilus influenza)  Urine Culture:                   [] None drawn      [] Negative             []  Positive (Details:  )  Sputum Culture:               [] None drawn       [] Negative             []  Positive (Details:  )   Endotracheal aspirate:     [] None drawn       [] Negative             []  Positive (Details:  )     Other Pertinent Labs and Pathology Results:

## 2019-02-13 NOTE — PROGRESS NOTES
Physical Therapy  PT dual attempted , but pt had just gotten back to bed with RN. Will re-attempt at later date.

## 2019-02-14 NOTE — PROGRESS NOTES
[facility] Hospitalist   Progress Note    Admitting Date and Time: 2/6/2019  3:25 AM  Admit Dx: Hypercapnic respiratory failure (HCC) [J96.92]  Hypercapnic respiratory failure ([address] [J96.92]    Seen for follow up on resp failure    Subjective: In bed  and on O2 2-4 l Nc  ,requied BIPAP intermittently last night . Passed swallow evals & is on reg diet . D/w bedside nurse and is for transfer to med floor. ROS: denies fever, chills, cp, n/v, HA unless stated above.  methylPREDNISolone  40 mg Intravenous Q8H    formoterol  20 mcg Nebulization BID    sodium chloride flush  10 mL Intravenous BID    QUEtiapine  25 mg Oral BID    metoprolol tartrate  25 mg Oral BID    ipratropium-albuterol  1 ampule Inhalation Q4H WA    enoxaparin  40 mg Subcutaneous Daily       acetaminophen 650 mg Q4H PRN   hydrALAZINE 10 mg Q6H PRN   LORazepam 1 mg Q4H PRN   medicated lip balm  PRN        Objective:    BP (!) 155/86   Pulse 95   Temp 97.7 °F (36.5 °C) (Oral)   Resp 20   Ht 5' (1.524 m)   Wt 126 lb 8.7 oz (57.4 kg)   SpO2 94%   BMI 24.71 kg/m²   General Appearance: alert and oriented to person, place and time, well developed and well- nourished,On O2 6 L NC  ,no overt distress noted.   Skin: warm and dry, no rash or erythema  Head: normocephalic and atraumatic  Eyes: pupils equal, round, and reactive to light, extraocular eye movements intact, conjunctivae normal  Neck: supple and non-tender without mass  Pulmonary/Chest: decreased BS at both base  Cardiovascular: normal rate, regular rhythm, normal S1 and S2  Abdomen: soft, non-tender, non-distended, normal bowel sounds, no masses or organomegaly  Extremities: no cyanosis, clubbing or edema  Musculoskeletal: normal range of motion, no joint swelling, deformity or tenderness  Neurologic: reflexes normal and symmetric, no cranial nerve deficit, gait, coordination and speech normal      Recent Labs      02/12/19   0504  02/13/19   0522  02/14/19   0576 NA  141  141  142   K  3.7  4.5  4.3   CL  100  100  100   CO2  36*  36*  34*   BUN  25*  27*  29*   CREATININE  0.5  0.4*  0.5   GLUCOSE  146*  181*  162*   CALCIUM  7.5*  8.0*  8.1*       Recent Labs      02/12/19   0504  02/13/19   0522  02/14/19   0530   WBC  13.6*  13.6*  12.4*   RBC  3.51  3.74  3.81   HGB  10.7*  11.5  11.6   HCT  34.7  36.1  36.7   MCV  98.9  96.5  96.3   MCH  30.5  30.7  30.4   MCHC  30.8*  31.9*  31.6*   RDW  15.9*  16.0*  16.2*   PLT  265  297  390   MPV  10.2  10.5  10.7       Labs and images reviewed    Radiology:   XR CHEST PORTABLE   Final Result   Stable bilateral pleural effusions. Stable nasogastric tube and central venous catheter. Mild perivascular congestion consistent with fluid overload. Cardiomegaly      The exam has been dictated and signed by Bobbie Sample, PA-C. Judene Dance, MD, Radiologist, have reviewed the images and   report and concur with these findings. XR CHEST PORTABLE   Final Result   Tortuous ectatic aorta   Cardiomegaly    Airspace disease compatible with atelectasis                     XR CHEST PORTABLE   Final Result   Findings compatible with congestive heart failure. Bilateral pleural effusions. Atherosclerotic disease of the aorta. The chest does not appear to be significantly changed in the interval      The exam has been dictated and signed by Emmalene Romano, PA-C. Judene Dance, MD, Radiologist, have reviewed the images and   report and concur with these findings. XR CHEST PORTABLE   Final Result   Findings compatible with congestive heart failure, this   is unchanged from previous. .   Findings compatible with atherosclerotic disease of the aorta. Stable endotracheal tube, NG tube and right central venous catheter   placement   Stable bilateral pleural effusion      The exam has been dictated and signed by Bobbie Sample, PA-C. Judene Dance, MD, Radiologist, have reviewed the images and   report and concur with these findings. XR CHEST PORTABLE   Final Result   Stable endotracheal tube, NG tube and right internal jugular central   venous catheter placement. Stable bilateral pleural effusions. Tortuous and ectatic aorta. Pulmonary vascular congestion consistent with fluid overload         The exam has been dictated and signed by Ksenia Dill PA-C. Kellen Medina MD, Radiologist, have reviewed the images and   report and concur with these findings. XR CHEST PORTABLE   Final Result      XR CHEST PORTABLE   Final Result   COPD with superimposed mild CHF. Right IJ central line without complications. XR CHEST PORTABLE   Final Result   Hazy opacities in the lower hemithoraces suggest developing bilateral basilar    pneumonia. This report has been electronically signed by Tasha Wyatt MD.          Assessment:    Active Problems:    Hypercapnic respiratory failure ([address]    COPD exacerbation ([address]    Palliative care by specialist  Resolved Problems:    * No resolved hospital problems. *      Plan:  Acute Hypercapnic resp failure  - required vent ,now on O2/BIPAP ,PULM following. As per pulm continue  NIV breaks QHS & PRN . Tolerate NIV breaks so now on O2 2-4  l nc. Hemophilus Pneumonia - completed rocephin course    Acute COPD exacerbation - on IV Steroids ,Nebs rxs + LABA ,O2-BIPAP support  & other supportive rx. Chronic Back  Pain- on supportive rx    Chronic tension Headaches as per hx     Opiate dependence - will     Nicotine dependence -will      HTN - on metoprolol as above. Nutrition -passed swallow evals & is on reg diet. .    On dvt /GI prophylaxis as above. Full code.         Electronically signed by Aureliano Fagan MD on 2/14/2019 at 10:11 AM

## 2019-02-14 NOTE — PROGRESS NOTES
Physical Therapy    Facility/Department: [address] INTERMEDIATE 1  Initial Assessment    NAME: Marcy Jamison  : 1950  MRN: [id number]    Date of Service: 2019    Patient Diagnosis(es): The primary encounter diagnosis was Acute hypercapnic respiratory failure ([facility] [address] Diagnoses of COPD exacerbation ([facility] [address] Pneumonia due to organism, Lactic acidosis, Hypokalemia, and Sepsis, due to unspecified organism [facility]) were also pertinent to this visit. has a past medical history of Chronic back pain; COPD (chronic obstructive pulmonary disease) ([facility] [address] Headache; Hypertension; and Osteoarthritis. has a past surgical history that includes Hysterectomy and  section. Evaluating Therapist: Girma Holloway PT    Room #:435  DIAGNOSIS: Hypercapnic respiratory failure  Additional Pertinent History:COPD, chronic back pain  PRECAUTIONS: falls, O2, alarm    Social:  Pt lives with  in a 2 floor plan, but bed and bath first floor 1 steps  to enter. Prior to admission independent without device     Initial Evaluation  Date:  Treatment      Short Term/ Long Term   Goals   Was pt agreeable to Eval/treatment? yes     Does pt have pain? Back pain     Bed Mobility  Rolling: mod assist  Supine to sit: mod assist  Sit to supine: NT  Scooting: mod assist  Min assist   Transfers Sit to stand: mod assist  Stand to sit: mod assist  Stand pivot:NT  CGA   Ambulation    15 feet with ww with min assist.  Pt fatigued with mobility  100 feet with ww with CGA   Stair Negotiation  Ascended and descended  NT      AM-PAC Raw score               13/24       Pt is alert and Oriented   LE ROM: WFl  LE strength: 3+/5  Balance: fair with ww  Sensation: intact  Endurance: fair-  Chair alarm: yes     ASSESSMENT  Pt displays functional ability as noted in the objective portion of this evaluation. Comments/Treatment:  Pt fatigued quickly with mobility. Cues for walker safety and assist to guide walker.          Patient education  Pt educated on transfer technique    Patient response to education:   Pt verbalized understanding Pt demonstrated skill Pt requires further education in this area   yes With cues yes     Rehab potential is good for reaching above PT goals. Pts/ family goals   1. To get stronger    Patient and or family understand(s) diagnosis, prognosis, and plan of care. PLAN  PT care will be provided in accordance with the objectives noted above. Whenever appropriate, clear delegation orders will be provided for nursing staff. Exercises and functional mobility practice will be used as well as appropriate assistive devices or modalities to obtain goals. Patient and family education will also be administered as needed. Frequency of treatments will be 2-5x/week x 5 days.     Ninoska HAYDEN  [id number]

## 2019-02-14 NOTE — PROGRESS NOTES
Critical Care Team: Daily Progress Note         Date and time: 2019 9:11 AM    Patient's name:  Jamil Guidry Record Number: [id number]    Patient's account/billing number: [de-identified]    Patient's YOB: 1950    Age: 77 y.o. Date of Admission: 2019  3:25 AM    Length of stay during current admission: 8    Primary Care Physician: Shirley Armenta DO    Previous Pulmonary: Nathan Pelletier    Code Status: Full Code    PMH:    Past Medical History:   Diagnosis Date    Chronic back pain     COPD (chronic obstructive pulmonary disease) ([facility]     Headache     Hypertension     Osteoarthritis        PSH:   Past Surgical History:   Procedure Laterality Date     SECTION      HYSTERECTOMY         Reason for ICU admission:   1. Respiratory failure      Subjective:     Events in the past 24 Hours:   1.  Awake and alert, off AVAPS      Current ventilation:     [] Ventilator  [] BIPAP/AVAPS  [x] Nasal Cannula [] Room Air      Secretions      Amount:  [] Small [] Moderate  _ Large  [] None  Color:     - White - Colored  - Bloody    Sedation:  RAAS Score:  [] Propofol gtt  [] Fentanyl gtt  [] Ativan gtt   [x] -etomidate    Paralyzed?:  [x] No    [] Yes      Vasopressors:  [x] No    [] Yes    If yes -   [] Levophed       [] Dopamine     [] Vasopressin       [] Dobutamine  [] Phenylephrine         [] Epinephrine    Golden catheter:   [] No   [x] Yes     Urine output:            [x] Good   [] Low              [] Anuric      Objective:     Vital signs:  BP (!) 184/95   Pulse 101   Temp 97.7 °F (36.5 °C) (Oral)   Resp 19   Ht 5' (1.524 m)   Wt 126 lb 8.7 oz (57.4 kg)   SpO2 96%   BMI 24.71 kg/m²   Tmax over 24 hours:  Temp (24hrs), Av.3 °F (36.8 °C), Min:97.7 °F (36.5 °C), Max:99 °F (37.2 °C)      Patient Vitals for the past 6 hrs:   BP Temp Temp src Pulse Resp SpO2   19 0900 (!) 184/95 - - 101 19 96 %   19 0814 (!) 184/95 - - 103 - - (Comprehensive) (if applicable): On avaps      ABGs:   Recent Labs      02/13/19   0556   PH  7.475*   PCO2  43.6   PO2  58.1*   HCO3  31.4*   BE  7.0*   O2SAT  89.3*       Laboratory findings:    Complete Blood Count:   Recent Labs      02/12/19   0504  02/13/19   0522  02/14/19   0530   WBC  13.6*  13.6*  12.4*   HGB  10.7*  11.5  11.6   HCT  34.7  36.1  36.7   PLT  265  297  390        Last 3 Blood Glucose:   Recent Labs      02/12/19   0504  02/13/19   0522  02/14/19   0530   GLUCOSE  146*  181*  162*        PT/INR:    Lab Results   Component Value Date    PROTIME 14.5 02/06/2019    INR 1.3 02/06/2019     PTT:    Lab Results   Component Value Date    APTT 31.7 02/06/2019       Comprehensive Metabolic Profile:   Recent Labs      02/12/19   0504 02/13/19 0522 02/14/19   0530   NA  141  141  142   K  3.7  4.5  4.3   CL  100  100  100   CO2  36*  36*  34*   BUN  25*  27*  29*   CREATININE  0.5  0.4*  0.5   GLUCOSE  146*  181*  162*   CALCIUM  7.5*  8.0*  8.1*   PROT  4.6*  5.1*  5.3*   LABALBU  2.4*  2.6*  3.0*   BILITOT  <0.2  0.2  0.3   ALKPHOS  61  65  64   AST  18  16  16   ALT  11  13  14      Magnesium:   Lab Results   Component Value Date    MG 2.2 02/14/2019     Phosphorus:   Lab Results   Component Value Date    PHOS 3.3 02/14/2019     Ionized Calcium: No results found for: CAION     Urinalysis:     Troponin: No results for input(s): TROPONINI in the last 72 hours. Microbiology past 24h:    Blood cultures:                 [] None drawn      [] Negative             [x]  Positive (Details: Haemophilus influenza)  Urine Culture:                   [] None drawn      [] Negative             []  Positive (Details:  )  Sputum Culture:               [] None drawn       [] Negative             []  Positive (Details:  )   Endotracheal aspirate:     [] None drawn       [] Negative             []  Positive (Details:  )     Other Pertinent Labs and Pathology Results:   1.      Radiology/Imaging:     XR CHEST PORTABLE   Final Result   Stable bilateral pleural effusions. Stable nasogastric tube and central venous catheter. Mild perivascular congestion consistent with fluid overload. Cardiomegaly      The exam has been dictated and signed by Laurie Snow PA-C. Rakel Olivas MD, Radiologist, have reviewed the images and   report and concur with these findings. XR CHEST PORTABLE   Final Result   Tortuous ectatic aorta   Cardiomegaly    Airspace disease compatible with atelectasis                     XR CHEST PORTABLE   Final Result   Findings compatible with congestive heart failure. Bilateral pleural effusions. Atherosclerotic disease of the aorta. The chest does not appear to be significantly changed in the interval      The exam has been dictated and signed by Aurora Rodas PA-C. Rakel Olivas MD, Radiologist, have reviewed the images and   report and concur with these findings. XR CHEST PORTABLE   Final Result   Findings compatible with congestive heart failure, this   is unchanged from previous. .   Findings compatible with atherosclerotic disease of the aorta. Stable endotracheal tube, NG tube and right central venous catheter   placement   Stable bilateral pleural effusion      The exam has been dictated and signed by VICENTE Thompson MD, Radiologist, have reviewed the images and   report and concur with these findings. XR CHEST PORTABLE   Final Result   Stable endotracheal tube, NG tube and right internal jugular central   venous catheter placement. Stable bilateral pleural effusions. Tortuous and ectatic aorta. Pulmonary vascular congestion consistent with fluid overload         The exam has been dictated and signed by Laurie Snow PA-C. Rakel Olivas MD, Radiologist, have reviewed the images and   report and concur with these findings.       XR CHEST PORTABLE   Final Result      XR CHEST PORTABLE   Final Result   COPD with superimposed mild CHF. Right IJ central line without complications. XR CHEST PORTABLE   Final Result   Hazy opacities in the lower hemithoraces suggest developing bilateral basilar    pneumonia. This report has been electronically signed by Ashley Aguilera MD.                Assessment:     Active Problems:    Hypercapnic respiratory failure ([facility]    COPD exacerbation ([facility] [address] [facility]    Palliative care by specialist  Resolved Problems:    * No resolved hospital problems. *      Additional assessment:     1. Hypercapnic respiratory failure. The patient has had no luck whatsoever coming off mechanical ventilation, noninvasive nature. Plan:     Wean per protocol:  [] No   [] Yes  [x] N/A    ICU Prophylaxis:  Stress ulcer:  [x] PPI Agent  [] U1Dmiti [] Sucralfate  [] Other:  VTE:   [x] Enoxaparin  [] Unfract. Heparin Subcut  [] EPC Cuffs    Nutrition:  [] NPO [] Tube Feeding (Specify: ) [] TPN  [x] PO (Diet: DIET GENERAL;)    Home medications reconciled: [] No  [x] Yes    Insulin drip:   [x] No   [] Yes    Family updated:    [x] No   [] Yes    Transfer Candidate?:   [x] No   [] Yes    Additional plans:  1. Increase activity  2. Stepdown   3. Per pulmonary          Chart review/lab review/X-ray viewing/documentation: 10 minutes  Assessment: 10 minutes  Conversation patient/family re: prognosis, care options and any end of life issues: 5 minutes  Conversation with staff: 10 minutes  Total critical care time exclusive of procedures: 35 minutes  SUE Mcmanus

## 2019-02-14 NOTE — PLAN OF CARE
Problem: Risk for Impaired Skin Integrity  Goal: Tissue integrity - skin and mucous membranes  Structural intactness and normal physiological function of skin and  mucous membranes.    Outcome: Met This Shift      Problem: Falls - Risk of:  Goal: Will remain free from falls  Will remain free from falls   Outcome: Met This Shift    Goal: Absence of physical injury  Absence of physical injury   Outcome: Met This Shift      Problem: Nutrition  Goal: Optimal nutrition therapy  Outcome: Not Met This Shift

## 2019-02-14 NOTE — PROGRESS NOTES
Intensive Care Daily Quality Rounding Checklist      ICU Team Members: Dr. Prabha Pressley, clinical pharmacist, bedside nurse, charge nurse    ICU Day #: 9    Intubation Date: N/A    Ventilator Day #: N/A    Central Line Insertion Date: February 6      Day #: 9     Arterial Line Insertion Date: N/A      Day #: N/A    DVT Prophylaxis:  Lovenox    GI Prophylaxis: on diet    Skin Issues/ Wounds and ordered treatment discussed on rounds: no issues    Goals/ Plans for the Day: Daily labs, advance diet and activity as tolerated, transfer to floor vs. [facility]

## 2019-02-14 NOTE — PROGRESS NOTES
Occupational Therapy  OCCUPATIONAL THERAPY INITIAL EVALUATION      Date:2019  Patient Name: Wilmer De Jesus  MRN: [id number]  : 1950  Room: [address] [place] [address]    Evaluating OT: Regla Green OTR/L [id number]    AM-PAC Daily Activity Raw Score: 15/24    Recommended Adaptive Equipment: TBD    Diagnosis: hypercapnic respiratory failure. Pt presents to ED in respiratory distress. Pertinent Medical History: chronic back pain, COPD, HTN, OA, opiate/nicotine dependence   Precautions:  Falls, O2     Home Living: Pt lives with  in a 2 story home with 1st floor B/B and 1 step on entry. Bathroom setup: walk in shower with shower chair     Prior Level of Function: Independent with ADLs, Independent with IADLs; completed functional mobility no AD but has [address] if needed  Driving: Yes    Pain Level: chronic back pain    Cognition: A&O: . Pt is alert and oriented. Easily distracted, mild confusion. Problem solving:  Fair   Judgement/safety:  Fair     Functional Assessment:   Initial Eval Status  Date: 19 Treatment session:  Short Term Goals  Treatment frequency: 2-5x/wk PRN x1-3 wks   Feeding Set up     Grooming Min A  Set up   UB Dressing Min A  Set up   LB Dressing Max A  Donning B socks  Min A    Bathing Mod A  SBA   Toileting Mod A  SBA   Bed Mobility  Supine to sit: Mod A     Functional Transfers STS: Mod A  SBA   Functional Mobility Min A with WW  Short in room distance  SBA during ADLs   Balance Sitting: fair    Standing: fair minus at [address]     Activity Tolerance Poor. Pt becomes increasingly weak, fatigued and SOB with short in room mobility  standing odalys x5-6 min with fair plus balance during self care tasks           ADL comments: Pt is limited by weakness, fatigue, SOB and required cues for safety and technique in all functional movements.       Strength ROM Additional Info:    RUE  3+/5 WFL good  and FMC/dexterity noted during ADL tasks     LUE 3+/5 WFL good  and FMC/dexterity noted during functional independence and quality of life. Bed/chair alarm: ON.      Low Evaluation + 0 timed treatment minutes    Evaluation time includes thorough review of current medical information, gathering information on past medical history/social history and prior level of function, completion of standardized testing/informal observation of tasks, assessment of data, and development of POC/Goals    [place] Nakia OTR/L  [id number]

## 2019-02-14 NOTE — PROGRESS NOTES
Date: 2/14/2019    Time: 12:08 AM    Patient Placed On BIPAP/CPAP/ Non-Invasive Ventilation? No    If no must comment. Facial area red/color change? No           If YES are Blister/Lesion present? No   If yes must notify nursing staff  BIPAP/CPAP skin barrier? No    Skin barrier type:NA       Comments: Pt currently refusing AVAPS. Pt keeps stating that she doesn't need it and that she is fine. RN aware is aware and machine remains on [facility].         Tracy Wyman

## 2019-02-14 NOTE — PROGRESS NOTES
76 yo with COPD, CHF, hypertension admitted with pneumonia and acute respiratory failure. Initially intubated. Extubated 2/10/19 but required AVAPS post extubation    CC/Overnight events:   Feels better, hoping to go home soon? ?    Current Facility-Administered Medications   Medication Dose Route Frequency Provider Last Rate Last Dose    methylPREDNISolone sodium (SOLU-MEDROL) injection 40 mg  40 mg Intravenous Q8H Niki Wilburn MD   40 mg at 02/14/19 1700    formoterol (PERFOROMIST) nebulizer solution 20 mcg  20 mcg Nebulization BID Niki Wilburn MD   20 mcg at 02/14/19 0723    sodium chloride flush 0.9 % injection 10 mL  10 mL Intravenous BID Zahida Salinas MD   10 mL at 02/14/19 0801    QUEtiapine (SEROQUEL) tablet 25 mg  25 mg Oral BID Gagandeep Hahn, DO   25 mg at 02/14/19 0801    acetaminophen (TYLENOL) tablet 650 mg  650 mg Oral Q4H PRN Gagandeep Hahn, DO   650 mg at 02/14/19 1428    hydrALAZINE (APRESOLINE) injection 10 mg  10 mg Intravenous Q6H PRN Ariela Simon MD   10 mg at 02/12/19 0806    metoprolol tartrate (LOPRESSOR) tablet 25 mg  25 mg Oral BID Gagandeep Hahn, DO   25 mg at 02/14/19 2368    LORazepam (ATIVAN) injection 1 mg  1 mg Intravenous Q4H PRN Gagandeep Hahn, DO   1 mg at 02/13/19 0103    medicated lip balm (BLISTEX/CARMEX) stick   Topical PRN Charla Calderon MD        ipratropium-albuterol (DUONEB) nebulizer solution 1 ampule  1 ampule Inhalation Q4H WA Gagandeep Hahn, DO   1 ampule at 02/14/19 1651    enoxaparin (LOVENOX) injection 40 mg  40 mg Subcutaneous Daily Charla Calderon MD   40 mg at 02/14/19 0815       Objective:   BP (!) 159/81   Pulse 96   Temp 98.5 °F (36.9 °C) (Oral)   Resp 16   Ht 5' (1.524 m)   Wt 126 lb 8.7 oz (57.4 kg)   SpO2 92%   BMI 24.71 kg/m²     Intake/Output Summary (Last 24 hours) at 02/14/19 1729  Last data filed at 02/14/19 1526   Gross per 24 hour   Intake              860 ml   Output             1550 ml   Net             -690 ml       Vent 02/14/2019    AST 16 02/14/2019    ALT 14 02/14/2019     ABG:    Lab Results   Component Value Date    PH 7.475 02/13/2019    PCO2 43.6 02/13/2019    PO2 58.1 02/13/2019    HCO3 31.4 02/13/2019    BE 7.0 02/13/2019    O2SAT 89.3 02/13/2019         CXR reviewed:   unchanged      Impression:  1. Pneumonia  2. COPD  3. Acute on chronic hypercarbic and hypoxemic respiratory failure - Tolerating NIV    Plans:   1. Completed  Rocephin. 2. Continue Duonebs (will cut down)+LABA  3. Wean O2 as tolerated, down to 2 lts. Wean to keep spo2 90-95%  4. Will cut downIV steroids, hop to change to po  5. Continue  NIV. QHS and prn. Will see if qualify for HOME NIV  6. Cut down Zyprexa to qhs x 3  7. D/c guillory cath  8. PT/OT    Patient with h/o Chronic Respiratory Failure due to Severe COPD and hospitalizations, who requires Mechanical ventilatory support. Such intervention will be of benefit to avoid re-admissions.

## 2019-02-14 NOTE — CARE COORDINATION
Awaiting [facility] auth from insurance. Preccassidy initiated 1/33.  PT/OT eval pending Иван Vegas

## 2019-02-15 PROBLEM — J96.02 ACUTE HYPERCAPNIC RESPIRATORY FAILURE (HCC): Status: ACTIVE | Noted: 2019-01-01

## 2019-02-15 NOTE — PROGRESS NOTES
Palliative Care Department  Palliative Care Progress Note  Provider: Jimena Espinoza [facility] days prior to Consult: Hospital Day: 10    Referring Provider:  Joshua Chan MD    Reason for Consult:  [x]  Code status Discussion  [x]  Assist with goals of care  []  Psychosocial support  []  Symptom Management      Chief Complaint: Shortness of breath    Subjective:   2/15/19  Patient has been able to wean off BiPAP. She is currently on nasal cannula sitting in bed with her  at her bedside eating some lunch. She is tolerating BiPAP overnight. She denies shortness of breath currently. She denies any other complaints. She reports her appetite is been poor even before but denies associated symptoms specifically nausea and vomiting. Patient did pass swallow study and is on a regular diet.     2/13/19  Tamara is seen at the bedside with her  present. She has been extubated, and is now tolerating NC O2 well. She currently denies SOB, but does complain of pain, which is located in her back and is chronic in nature. We discussed goals of care and code as detailed below. 2/12/19  HPI:  Vince Teague is a 77 y.o. female with significant past medical history of COPD, hypertension, chronic headaches on fiorinal who presented to the emergency department with shortness of breath. ABGs showed hypercapnic acidosis, she was very lethargic and due to these findings she was intubated. She was admitted to the critical care unit with acute hypercapnic respiratory failure, COPD exacerbation, pneumonia, lactic acidosis, hypokalemia and sepsis. She was extubated 2/11 however required Bipap/AVAPS. She is being treated for Haemophilus influenza with ceftriaxone. Despite these treatments she has not been able to be weaned from BiPAP. Palliative medicine was consult to discuss goals of care and code status.    Patient sleeping, no family at bedside    Goals of care: to be determined    Code Status: full code    Advanced Directives: none    Surrogate/Legal NOK: Spouse  Contacts:  [email or web address] [phone]    Spiritual assessment: No spiritual distress identified     Bereavement and grief: to be determined    Past Medical History:   Diagnosis Date    Chronic back pain     [facility]     Headache     Hypertension     Osteoarthritis        Past Surgical History:   Procedure Laterality Date     SECTION      HYSTERECTOMY       No family history on file.     Social history:   status: no  Marital status:   Living status: with family:  spouse   Work history: [employer]    History obtain from EMR    Current Medications:  Inpatient medications reviewed: yes  Home Medications reviewed: yes    Allergies   Allergen Reactions    Sulfa Antibiotics Hives       Review of Systems:  Patient sleeping, not obtained currently    Objective:   PHYSICAL EXAM:   Vitals:    BP (!) 156/77   Pulse 86   Temp 98.7 °F (37.1 °C) (Oral)   Resp 20   Ht 5' (1.524 m)   Wt 128 lb 6.4 oz (58.2 kg)   SpO2 92%   BMI 25.08 kg/m²   Gen: Awake and alert, no acute distress   HEENT: normocephalic, atraumatic, conjunctiva clear, sclera anicteric, mucous membranes pink and moist  Lungs: Diminished breath sounds bilaterally, no wheezing rhonchi rales  Heart: Regular rate and rhythm no murmurs  Abdomen: Positive bowel sounds, soft nontender nondistended  Skin:  No significant rash  Neuro: No gross neurologic deficits  Extremities: Clubbing of fingers ×10, no edema lower extremities bilaterally    Results/Verification of Data Review  Objective data reviewed: labs, images, records, medication use, vitals and chart    Assessment/Plan      Active Hospital Problems    Diagnosis Date Noted    COPD exacerbation ([facility] [J44.1]     Palliative care by specialist [Z51.5]     Hypercapnic respiratory failure ([facility] [J96.92] 2019       Pneumonia- respiratory failure requiring intubation and continued BiPAP, COPD exacerbation:  - Improved, patient was not oxygen dependent before her admission to the hospital. Discharge preparations underway in regards to level of care    Palliative Care Encounter/Recommendations:  Support provided to the patient and her . She is doing much better currently. Her symptoms are significantly improved. She really wishes to go home however she is uncertain what the plan is at this point. They do wish her to remain a full code. Family Meeting:  Participants:Spouse and patient    Discharge planning: to be determined    Patient meets criteria for general inpatient hospice care including the following: N/A- Palliative Care Patient    Data in Support of Terminal Illness:N/A Palliative Patient. Discussed patient and the plan of care with the other IDT members of Palliative Care and Dr. Gareth Zarate, and with floor nurse. Referrals to: none today    Time/Communication  Greater than 51% of time spent, total 35 minutes in counseling and coordination of care at the bedside regarding goals of care, symptom management, diagnosis and prognosis and see above. Yulia Srivastava PA-C  Palliative Medicine    Thank you for allowing Palliative Medicine to participate in the care of Tamara Abraham. Note: This report was completed using [employer] voiced recognition software. Every effort has been made to ensure accuracy; however, inadvertent computerized transcription errors may be present.

## 2019-02-15 NOTE — CARE COORDINATION
Social Work/Discharge Planning:  Zaina Santiago from [facility] can accept patient and can initiate pre-cert once insurance provides LTAC determination. Notified patient and her . Will continue to follow.   Electronically signed by MARILU Owens on 2/15/2019 at 1:04 PM

## 2019-02-15 NOTE — CARE COORDINATION
MET  WITH PT/ AT  BEDSIDE;  INTRODUCED  MYSELF  AS AN RN    AND  EXPLAINED  MY  ROLE. DISCUSSED  CURRENT  COURSE  OF  TREATMENT/PLAN  OF  CARE. PT/  EXPRESSED  UNDERSTANDING. PT  AWAITNG  DETERMINATION OF  LTAC  BY  INSURANCE. PLAN  B  IS  FOR  [facility]; CHOICES  PROVIDED  TO  SOCIAL WORK. D/W SOCIAL  WORK. WILL FOLLOW  ALONG   WITH  SOCIAL  WORK  FOR  ANY  ADDITIONAL  NEEDS. Andrea Pan RN,CM.

## 2019-02-15 NOTE — PROGRESS NOTES
[facility] Hospitalist   Progress Note    Admitting Date and Time: 2/6/2019  3:25 AM  Admit Dx: Hypercapnic respiratory failure (HCC) [J96.92]  Hypercapnic respiratory failure ([address] [J96.92]    Seen for follow up on resp failure    Subjective: In bed  and on O2 2-4 l Nc  ,requied BIPAP intermittently last night . Passed swallow evals & is on reg diet . D/w bedside nurse and is for transfer to med floor. 2/15 - seen on floor ,doing well ,much better c/f earlier. SOB persists but in no overt distress ,denies CP ,n/v/abd pain. ROS: denies fever, chills, cp, n/v, HA unless stated above.  methylPREDNISolone  40 mg Intravenous Q12H    predniSONE  40 mg Oral Daily    QUEtiapine  25 mg Oral Nightly    ipratropium-albuterol  1 ampule Inhalation 4x daily    formoterol  20 mcg Nebulization BID    sodium chloride flush  10 mL Intravenous BID    metoprolol tartrate  25 mg Oral BID    enoxaparin  40 mg Subcutaneous Daily       acetaminophen 650 mg Q4H PRN   hydrALAZINE 10 mg Q6H PRN   LORazepam 1 mg Q4H PRN   medicated lip balm  PRN        Objective:    BP (!) 183/90   Pulse 90   Temp 97.9 °F (36.6 °C) (Oral)   Resp 18   Ht 5' (1.524 m)   Wt 128 lb 6.4 oz (58.2 kg)   SpO2 92%   BMI 25.08 kg/m²   General Appearance: alert and oriented to person, place and time, well developed and well- nourished,On O2 6 L NC  ,no overt distress noted.   Skin: warm and dry, no rash or erythema  Head: normocephalic and atraumatic  Eyes: pupils equal, round, and reactive to light, extraocular eye movements intact, conjunctivae normal  Neck: supple and non-tender without mass  Pulmonary/Chest: decreased BS at both base  Cardiovascular: normal rate, regular rhythm, normal S1 and S2  Abdomen: soft, non-tender, non-distended, normal bowel sounds, no masses or organomegaly  Extremities: no cyanosis, clubbing or edema  Musculoskeletal: normal range of motion, no joint swelling, deformity or tenderness  Neurologic: reflexes normal and symmetric, no cranial nerve deficit, gait, coordination and speech normal      Recent Labs      02/13/19 0522 02/14/19   0530  02/15/19   0430   NA  141  142  144   K  4.5  4.3  4.5   CL  100  100  102   CO2  36*  34*  34*   BUN  27*  29*  34*   CREATININE  0.4*  0.5  0.5   GLUCOSE  181*  162*  118*   CALCIUM  8.0*  8.1*  8.1*       Recent Labs      02/13/19 0522 02/14/19   0530  02/15/19   0430   WBC  13.6*  12.4*  12.2*   RBC  3.74  3.81  3.70   HGB  11.5  11.6  11.2*   HCT  36.1  36.7  35.6   MCV  96.5  96.3  96.2   MCH  30.7  30.4  30.3   MCHC  31.9*  31.6*  31.5*   RDW  16.0*  16.2*  16.4*   PLT  297  390  382   MPV  10.5  10.7  10.6       Labs and images reviewed    Radiology:   XR CHEST PORTABLE   Final Result   Stable bilateral pleural effusions. Stable nasogastric tube and central venous catheter. Mild perivascular congestion consistent with fluid overload. Cardiomegaly      The exam has been dictated and signed by Denia Swift PA-C. Efrem Duncan MD, Radiologist, have reviewed the images and   report and concur with these findings. XR CHEST PORTABLE   Final Result   Tortuous ectatic aorta   Cardiomegaly    Airspace disease compatible with atelectasis                     XR CHEST PORTABLE   Final Result   Findings compatible with congestive heart failure. Bilateral pleural effusions. Atherosclerotic disease of the aorta. The chest does not appear to be significantly changed in the interval      The exam has been dictated and signed by Marguerite Fair PA-C. Efrem Duncan MD, Radiologist, have reviewed the images and   report and concur with these findings. XR CHEST PORTABLE   Final Result   Findings compatible with congestive heart failure, this   is unchanged from previous. .   Findings compatible with atherosclerotic disease of the aorta.    Stable endotracheal tube, NG tube and right central venous catheter   placement   Stable bilateral MD on 2/15/2019 at 11:38 AM

## 2019-02-15 NOTE — CARE COORDINATION
Social Work/Discharge Planning:  Social Work consult noted for NIV. Met with patient and discussed approved insurance providers for a NIV. Discussed [facility] score and provided patient with snf choice list to review. Referral made to liaelver Colunga from [place]. Will continue to follow.   Electronically signed by MARILU Garcia on 2/15/2019 at 9:35 AM

## 2019-02-15 NOTE — PROGRESS NOTES
Nutrition Assessment    Type and Reason for Visit: Reassess    Nutrition Recommendations: Continue current diet    Nutrition Assessment: Pt improving from a nutritional standpoint d/t extubated, tolerating PO diet, and having stable wt during admit. Pt states appetite is good. Declines ONS at this time. Will continue current diet and monitor. Malnutrition Assessment:  · Malnutrition Status: At risk for malnutrition  · Context: Acute illness or injury  · Findings of the 6 clinical characteristics of malnutrition (Minimum of 2 out of 6 clinical characteristics is required to make the diagnosis of moderate or severe Protein Calorie Malnutrition based on AND/ASPEN Guidelines):  1. Energy Intake-Less than or equal to 50% of estimated energy requirement,  (x 2 days)    2. Weight Loss-No significant weight loss,    3. Fat Loss-No significant subcutaneous fat loss,    4. Muscle Loss-Mild muscle mass loss, Clavicles (pectoralis and deltoids)  5. Fluid Accumulation-No significant fluid accumulation,    6.  Strength-Not measured    Nutrition Risk Level: Moderate    Nutrient Needs:  · Estimated Daily Total Kcal: 8242-2909 (MSJ 1028 x 1.2 SF)  · Estimated Daily Protein (g): 55-65 (1.2-1.4 gm/kg IBW)  · Estimated Daily Total Fluid (ml/day): 5179-3370 (1 ml/kcal)    Nutrition Diagnosis:   · Problem: Inadequate oral intake  · Etiology: related to Impaired respiratory function-inability to consume food (AVAPS after extubated)     Signs and symptoms:  as evidenced by Intake 25-50%, Diet history of poor intake    Objective Information:  · Nutrition-Focused Physical Findings: pt alert, NGT removed, active BS, soft abd, +I/Os, trace BLE edema.    · Wound Type: None  · Current Nutrition Therapies:  · Oral Diet Orders: General   · Oral Diet intake: 26-50%  · Oral Nutrition Supplement (ONS) Orders: None  · ONS intake: NPO  · Anthropometric Measures:  · Ht: 5' (152.4 cm)   · Current Body Wt: 128 lb (58.1 kg) (2/15 bed scale)  · Admission Body Wt: 121 lb (54.9 kg)  · Usual Body Wt: 123 lb (55.8 kg) (per EMR hx x 6 months)  · % Weight Change:  slight gain during admit w/ +fluid balance noted. Wt relatively stable at this time.   · Ideal Body Wt: 100 lb (45.4 kg), % Ideal Body 128%  · BMI Classification: BMI 25.0 - 29.9 Overweight    Nutrition Interventions:   Continue current diet  Continued Inpatient Monitoring, Education declined, Coordination of Care (Pt declines all ONS at this time stating she dislikes the taste)    Nutrition Evaluation:   · Evaluation: Goals set   · Goals: Pt to consume >50% most meals    · Monitoring: Meal Intake, Diet Tolerance, Skin Integrity, I&O, Weight, Pertinent Labs, Monitor Hemodynamic Status      Electronically signed by Lu Mar, MS, RD, LD on 2/15/19 at 12:30 PM    Contact Number: [phone]

## 2019-02-15 NOTE — PROGRESS NOTES
78 yo with COPD, CHF, hypertension admitted with pneumonia and acute respiratory failure. Initially intubated. Extubated 2/10/19 but required AVAPS post extubation    CC/Overnight events:   Feels better, hoping to go home soon? ?    Current Facility-Administered Medications   Medication Dose Route Frequency Provider Last Rate Last Dose    methylPREDNISolone sodium (SOLU-MEDROL) injection 40 mg  40 mg Intravenous Q12H Kim Urias MD   40 mg at 02/15/19 0541    predniSONE (DELTASONE) tablet 40 mg  40 mg Oral Daily Kim Urias MD   40 mg at 02/15/19 0933    QUEtiapine (SEROQUEL) tablet 25 mg  25 mg Oral Nightly Kim Urias MD   25 mg at 02/14/19 2016    ipratropium-albuterol (Ya Clos) nebulizer solution 1 ampule  1 ampule Inhalation 4x daily Kim Urias MD   1 ampule at 02/15/19 1133    formoterol (PERFOROMIST) nebulizer solution 20 mcg  20 mcg Nebulization BID Kim Urias MD   20 mcg at 02/15/19 0919    sodium chloride flush 0.9 % injection 10 mL  10 mL Intravenous BID Yohana Quintero MD   10 mL at 02/15/19 0933    acetaminophen (TYLENOL) tablet 650 mg  650 mg Oral Q4H PRN Pallavi Deluna DO   650 mg at 02/15/19 0542    hydrALAZINE (APRESOLINE) injection 10 mg  10 mg Intravenous Q6H PRN Ludivina Jamil MD   10 mg at 02/15/19 0933    metoprolol tartrate (LOPRESSOR) tablet 25 mg  25 mg Oral BID Pallavi Deluna, DO   25 mg at 02/15/19 0505    LORazepam (ATIVAN) injection 1 mg  1 mg Intravenous Q4H PRN Pallavi Deluna DO   1 mg at 02/13/19 0103    medicated lip balm (BLISTEX/CARMEX) stick   Topical PRN Kirti Haley MD        enoxaparin (LOVENOX) injection 40 mg  40 mg Subcutaneous Daily Kirti Haley MD   40 mg at 02/15/19 0933       Objective:   BP (!) 156/77   Pulse 86   Temp 98.7 °F (37.1 °C) (Oral)   Resp 20   Ht 5' (1.524 m)   Wt 128 lb 6.4 oz (58.2 kg)   SpO2 92%   BMI 25.08 kg/m²     Intake/Output Summary (Last 24 hours) at 02/15/19 1523  Last data filed at 02/15/19 7075 Gross per 24 hour   Intake              300 ml   Output              250 ml   Net               50 ml       Vent Information  $Ventilation: $Subsequent Day  Ventilator Started: Yes  Vent Type: 840  Vent Mode: PS  Vt Ordered: 400 mL  Rate Set: 0 bmp  Peak Flow: 70 L/min  Pressure Support: 8 cmH20  FiO2 : 35 %  Sensitivity: 1  PEEP/CPAP: 5  I Time/ I Time %: 0 s  Cuff Pressure (cm H2O): 29 cm H2O  Humidification Source: Heated wire  Humidification Temp: 37  Humidification Temp Measured: 38  Circuit Condensation: Drained    WDWN female in no acute distress,  Skin: warm, dry, without rash - multiple ecchymoses  HEENT: PERRL, face symmetric, normal dry oral mucosa. No neck mass, adenopathy, or thyromegaly  Chest: symmetric with equal air entry. R TLC  Lungs: decreased breath sounds without rales, rhonchi, or wheezes  Cardiac: normal PMI, S1, S2 normal.  No murmur or lloyd  Abdomen: rounded, soft, non-tender, active bowel sounds. No mass or hepatosplenomegaly  Extremities: No clubbing, cyanosis, or edema  Neuro: CN II - XII grossly intact. Alert, moves all extremities symmetrically.  Appropriate and interactive    CBC with Differential:    Lab Results   Component Value Date    WBC 12.2 02/15/2019    RBC 3.70 02/15/2019    HGB 11.2 02/15/2019    HCT 35.6 02/15/2019     02/15/2019    MCV 96.2 02/15/2019    MCH 30.3 02/15/2019    MCHC 31.5 02/15/2019    RDW 16.4 02/15/2019    NRBC 2.0 02/10/2019    METASPCT 0.9 02/06/2019    LYMPHOPCT 4.3 02/15/2019    MONOPCT 4.7 02/15/2019    BASOPCT 0.2 02/15/2019    MONOSABS 0.57 02/15/2019    LYMPHSABS 0.53 02/15/2019    EOSABS 0.00 02/15/2019    BASOSABS 0.02 02/15/2019     CMP:    Lab Results   Component Value Date     02/15/2019    K 4.5 02/15/2019     02/15/2019    CO2 34 02/15/2019    BUN 34 02/15/2019    CREATININE 0.5 02/15/2019    GFRAA >60 02/15/2019    LABGLOM >60 02/15/2019    GLUCOSE 118 02/15/2019    GLUCOSE 93 01/31/2011    PROT 5.3 02/14/2019 LABALBU 3.0 02/14/2019    LABALBU 3.8 01/31/2011    CALCIUM 8.1 02/15/2019    BILITOT 0.3 02/14/2019    ALKPHOS 64 02/14/2019    AST 16 02/14/2019    ALT 14 02/14/2019     ABG:    Lab Results   Component Value Date    PH 7.475 02/13/2019    PCO2 43.6 02/13/2019    PO2 58.1 02/13/2019    HCO3 31.4 02/13/2019    BE 7.0 02/13/2019    O2SAT 89.3 02/13/2019         CXR reviewed:   unchanged      Impression:  1. Pneumonia  2. COPD  3. Acute on chronic hypercarbic and hypoxemic respiratory failure - Tolerating NIV    Plans:   1. Completed  Rocephin. 2. Continue Duonebs (will cut down)+LABA  3. Wean O2 as tolerated, down to 2 lts. Wean to keep spo2 90-95%  4. Will d/c  IV steroids, to start po OCS  Will start ICS in am  5. Continue  NIV. QHS and prn. Will see if qualify for HOME NIV (DME form already signed)  6. Cut down Zyprexa to qhs x 3  7. D/c guillory cath  8. PT/OT    Patient with h/o Chronic Respiratory Failure due to Severe COPD and hospitalizations, who requires Mechanical ventilatory support. Such intervention will be of benefit to avoid re-admissions.

## 2019-02-15 NOTE — CARE COORDINATION
STILL NO  RESPONSE  FROM  [facility];  [facility]. REQUESTED  THAT  ANGELA FROM  [facility]  WITHDRAW  [facility]  REQUEST;  WILL  PROCEED  WITH  PRECERT  FOR  JIMMY Cat. D/W  [employer]  SOCIAL  WORK. Hakan Masters  RN,CM.

## 2019-02-15 NOTE — PATIENT CARE CONFERENCE
[facility] Quality Flow/Interdisciplinary Rounds Progress Note        Quality Flow Rounds held on February 15, 2019    Disciplines Attending:  Bedside Nurse, ,  and Nursing Unit Leadership    Devyn Kasper was admitted on 2/6/2019  3:25 AM    Anticipated Discharge Date:  Expected Discharge Date: 02/14/19    Disposition:    Wyatt Score:  Wyatt Scale Score: 19    Readmission Risk              Risk of Unplanned Readmission:        23           Discussed patient goal for the day, patient clinical progression, and barriers to discharge.   The following Goal(s) of the Day/Commitment(s) have been identified:  Monitor labs and pulmonary status, continue IV solumedrol, plan per pulmonary, discharge planning      Laura [facility]  February 15, 2019

## 2019-02-15 NOTE — CARE COORDINATION
Social Work/Discharge Planning:  Met with patient and her  Lillian Thomas regarding snf choice. Patient and her  stated first snf choice is [facility] [place] [facility], [facility] [employer] and [id number]. Referral made to yeny Kang at [facility] and facility will review patient information. Referral made to liaelver Womack at [facility] and facility will review patient information. Will continue to follow.   Electronically signed by MARILU Correa on 2/15/2019 at 12:01 PM

## 2019-02-15 NOTE — PROGRESS NOTES
02/14/19 2121   NIV Type   Mode AVAPS   Mask Type Full face mask   Mask Size Small   Settings/Measurements   Comfort Level Good   Using Accessory Muscles No   EPAP 5 cmH2O   Vt Ordered 400 mL   Rate Ordered 12   Resp 22   FiO2  35 %   Vt Exhaled 438 mL   Mask Leak (lpm) 33 lpm   Skin Protection for O2 Device Yes   Date: 2/14/2019    Time: 9:21 PM    Patient Placed On BIPAP/CPAP/ Non-Invasive Ventilation? Yes    If no must comment. Facial area red/color change? No           If YES are Blister/Lesion present? No   If yes must notify nursing staff  BIPAP/CPAP skin barrier?   Yes    Skin barrier type:Liquicel       Comments:        Keren Rios

## 2019-02-15 NOTE — CARE COORDINATION
Social Work/Discharge Planning:  Pre-cert for [facility] was withdrawn. Called Ender Valenzuela from [place] and requested for facility to start pre-cert. Ender Valenzuela stated facility will submit but will need updated therapy notes for Monday. Will continue to follow and wait for pre-cert.   Electronically signed by MARILU Robb on 2/15/2019 at 3:57 PM

## 2019-02-15 NOTE — PROGRESS NOTES
Physical Therapy  Facility/Department: [address] INTERMEDIATE 1  Daily Treatment Note  NAME: Mau Gomez  : 1950  MRN: [id number]    Date of Service: 2/15/2019    Attempted to treat pt x 2 this date. In AM pt SOB with [facility] staff present reporting she just assisted pt in bathing. Pt c/o fatigue. Therapy returned in afternoon and pt reported just returning to bed from chair and declined participation. Pt encouraged to ambulate or sit in bedside chair. Pt continued to decline. Will recheck at a later time/date as able continue with current POC.      Santo Done PTA [id number]

## 2019-02-16 NOTE — PROGRESS NOTES
[facility] Hospitalist   Progress Note    Admitting Date and Time: 2/6/2019  3:25 AM  Admit Dx: Hypercapnic respiratory failure (HCC) [J96.92]  Hypercapnic respiratory failure ([facility] [J96.92]    Subjective: Patient sitting up in bed, in no acute distress. On 1L NC, appears uncomfortable but in no respiratory distress. Patient states that she continues to feel mildly short of breath. She denies cough, CP, nausea, vomiting, fever, chills, leg/calf pain. She states she is ready to be discharged to home. She is denying further transfer to rehab facility. Patient was admitted with Hypercapnic respiratory failure ([facility] [J96.92]  [facility] failure [facility] [J96.92]. ROS: denies fever, chills, cp, sob, n/v, HA unless stated above.      budesonide  250 mcg Nebulization BID    predniSONE  40 mg Oral Daily    QUEtiapine  25 mg Oral Nightly    ipratropium-albuterol  1 ampule Inhalation 4x daily    formoterol  20 mcg Nebulization BID    sodium chloride flush  10 mL Intravenous BID    metoprolol tartrate  25 mg Oral BID    enoxaparin  40 mg Subcutaneous Daily       acetaminophen 650 mg Q4H PRN   hydrALAZINE 10 mg Q6H PRN   LORazepam 1 mg Q4H PRN   medicated lip balm  PRN        Objective:    BP (!) 156/77   Pulse 79   Temp 98.4 °F (36.9 °C) (Oral)   Resp 20   Ht 5' (1.524 m)   Wt 129 lb 8 oz (58.7 kg)   SpO2 92%   BMI 25.29 kg/m²   General Appearance: sitting up in bed, in no acute distress  Skin: warm and dry, no rash or erythema  Head: normocephalic and atraumatic  Pulmonary/Chest: on 1L NC, accessory muscle use but no respiratory distress, expiratory wheezing throughout all lung fields   Cardiovascular: normal rate, normal S1 and S2, no gallops and intact distal pulses  Abdomen: soft, non-tender, non-distended, normal bowel sounds, no masses or organomegaly  Extremities: no cyanosis, no clubbing and no edema  Musculoskeletal: normal range of motion, no joint swelling, deformity or tenderness  Neurologic: speech normal, cranial nerves grossly intact     Recent Labs      02/14/19   0530  02/15/19   0430  02/16/19   0332   NA  142  144  140   K  4.3  4.5  4.1   CL  100  102  100   CO2  34*  34*  34*   BUN  29*  34*  31*   CREATININE  0.5  0.5  0.6   GLUCOSE  162*  118*  146*   CALCIUM  8.1*  8.1*  8.1*       Recent Labs      02/14/19   0530  02/15/19   0430  02/16/19   0332   WBC  12.4*  12.2*  11.6*   RBC  3.81  3.70  3.75   HGB  11.6  11.2*  11.3*   HCT  36.7  35.6  36.2   MCV  96.3  96.2  96.5   MCH  30.4  30.3  30.1   MCHC  31.6*  31.5*  31.2*   RDW  16.2*  16.4*  16.5*   PLT  390  382  424   MPV  10.7  10.6  10.3       Radiology:   XR CHEST PORTABLE   Final Result   Stable bilateral pleural effusions. Stable nasogastric tube and central venous catheter. Mild perivascular congestion consistent with fluid overload. Cardiomegaly      The exam has been dictated and signed by Margaretmary Goltz, PA-C. Fer Hinds MD, Radiologist, have reviewed the images and   report and concur with these findings. XR CHEST PORTABLE   Final Result   Tortuous ectatic aorta   Cardiomegaly    Airspace disease compatible with atelectasis                     XR CHEST PORTABLE   Final Result   Findings compatible with congestive heart failure. Bilateral pleural effusions. Atherosclerotic disease of the aorta. The chest does not appear to be significantly changed in the interval      The exam has been dictated and signed by Antonio Gallagher PA-C. Fer Hinds MD, Radiologist, have reviewed the images and   report and concur with these findings. XR CHEST PORTABLE   Final Result   Findings compatible with congestive heart failure, this   is unchanged from previous. .   Findings compatible with atherosclerotic disease of the aorta.    Stable endotracheal tube, NG tube and right central venous catheter   placement   Stable bilateral pleural effusion      The exam has been dictated and signed by Andres Grimaldo PA-C. Luis Fernando Mcdonough MD, Radiologist, have reviewed the images and   report and concur with these findings. XR CHEST PORTABLE   Final Result   Stable endotracheal tube, NG tube and right internal jugular central   venous catheter placement. Stable bilateral pleural effusions. Tortuous and ectatic aorta. Pulmonary vascular congestion consistent with fluid overload         The exam has been dictated and signed by Andres Grimaldo PA-C. Luis Fernando Mcdonough MD, Radiologist, have reviewed the images and   report and concur with these findings. XR CHEST PORTABLE   Final Result      XR CHEST PORTABLE   Final Result   COPD with superimposed mild CHF. Right IJ central line without complications. XR CHEST PORTABLE   Final Result   Hazy opacities in the lower hemithoraces suggest developing bilateral basilar    pneumonia. This report has been electronically signed by Letitia Ellsworth MD.          Assessment:    Active Problems:    Acute hypercapnic respiratory failure ([address]    COPD exacerbation ([facility]    Palliative care by specialist    Dyspnea    Counseling regarding advanced care planning and goals of care  Resolved Problems:    * No resolved hospital problems. *      Plan:  1. Acute hypercapnic respiratory failure: pulmonology following. Patient required vent. Patient now on 1L NC, does not wear O2 at baseline. Continue to wean O2 as tolerated. Continue NIV qhs. Per patient, she has been approved for home NIV. 2. Hemophilus pneumonia: completed course of cetriaxone. 3. Acute COPD exacerbation: on IV steroids. Continue nebs. IV steroids tapered to PO. Continue to wean O2.   4. HTN: continue home metoprolol  5. Nutrition: patient passed swallow evaluation. On regular diet, tolerating well  6. Dispo: palliative care consulted regarding code status- patient remains full code and wishes to continue current treatment.  Order placed for home oxygen and home

## 2019-02-17 PROBLEM — E43 PROTEIN-CALORIE MALNUTRITION, SEVERE (HCC): Status: ACTIVE | Noted: 2019-01-01

## 2019-02-17 NOTE — PROGRESS NOTES
Date: 2/16/2019    Time: 8:34 PM    Patient Placed On BIPAP/CPAP/ Non-Invasive Ventilation? No    If no must comment. Facial area red/color change? No           If YES are Blister/Lesion present? No   If yes must notify nursing staff  BIPAP/CPAP skin barrier? Yes    Skin barrier type:Liquicel       Comments: Patient refusing BiPAP states she is so uncomfortable on it she cannot tolerate it anymore and feels her breathing is good. She is resting comfortably on 1L NC. Machine is at bedside if needed.         Gray Ashley

## 2019-02-17 NOTE — PROGRESS NOTES
Musculoskeletal: She exhibits edema. Neurological: She is alert and oriented to person, place, and time. Nursing note and vitals reviewed. Recent Labs      02/15/19   0430  02/16/19 0332 02/17/19 0314   NA  144  140  145   K  4.5  4.1  4.1   CL  102  100  105   CO2  34*  34*  31*   BUN  34*  31*  33*   CREATININE  0.5  0.6  0.5   GLUCOSE  118*  146*  94   CALCIUM  8.1*  8.1*  7.7*     Recent Labs      02/15/19   0430  02/16/19   0332  02/17/19   0314   WBC  12.2*  11.6*  12.7*   RBC  3.70  3.75  3.64   HGB  11.2*  11.3*  11.2*   HCT  35.6  36.2  35.3   MCV  96.2  96.5  97.0   MCH  30.3  30.1  30.8   MCHC  31.5*  31.2*  31.7*   RDW  16.4*  16.5*  16.8*   PLT  382  424  417   MPV  10.6  10.3  10.5           Active Problems:    Acute hypercapnic respiratory failure (HCC)    Acute COPD exacerbation (HCC)    H influenza infection lungs    Palliative care by specialist    Dyspnea    Counseling regarding advanced care planning and goals of care    Severe [employer] Malnutrition as evidenced by lymphocyte counts under 800 cells per CC., loss of subcutaneous fat, atrophy of muscles and 2+ edema. Resolved Problems:    * No resolved hospital problems. *      Plan:  DC planning: either home with [place] [id number] and DME including Vent versus SNF for Vent, Resp Tx  No changes to current plan which is supportive.   Check Ionized Calcium  Nutritional support with protein    Electronically signed by Torey Da Silva MD on 2/17/2019 at 9:26 AM

## 2019-02-18 NOTE — PROGRESS NOTES
Occupational Therapy  OT BEDSIDE TREATMENT NOTE      Date:2019  Patient Name: Devyn Kasper  MRN: [id number]  : 1950  Room: [address] [place] [address]     Evaluating OT: Ragini Yee OTR/L [id number]     AM-PAC Daily Activity Raw Score: 15     Recommended Adaptive Equipment:  Shower chair      Diagnosis: hypercapnic respiratory failure. Pt presents to ED in respiratory distress.      Pertinent Medical History: chronic back pain, COPD, HTN, OA, opiate/nicotine dependence   Precautions:  Falls, O2     Home Living: Pt lives with  in a 2 story home with 1st floor B/B and 1 step on entry. Bathroom setup: walk in shower with shower chair      Prior Level of Function: Independent with ADLs, Independent with IADLs; completed functional mobility no AD but has [employer] if needed  Driving: Yes     Pain Level: chronic back pain     Cognition:  alert and oriented. Pt self limiting during activity. Functional Assessment:    Initial Eval Status  Date: 19 Treatment session:  Short Term Goals  Treatment frequency: 2-5x/wk PRN x1-3 wks   Feeding Set up       Grooming Min A  sandoval seated Set up   UB Dressing Min A   Set up   LB Dressing Max A  Donning B socks  mod A Min A    Bathing Mod A   SBA   Toileting Mod A  refused  SBA   Bed Mobility  Supine to sit: Mod A  min A     Functional Transfers STS: Mod A  min A  SBA   Functional Mobility Min A with [employer]  Short in room distance  min A w/w  3-4 steps SBA during ADLs   Balance Sitting: fair     Standing: fair minus at [employer]  sit balance good   Stand balance fair     Activity Tolerance Poor. Pt becomes increasingly weak, fatigued and SOB with short in room mobility Poor   standing odalys x5-6 min with fair plus balance during self care tasks       Comments:  Pt requesting assistance with all activity. Self limiting during activity. States she can not tolerate any activity then reports dizziness. Much encouragement for pt to sit up in the chair.      Exercise: pt

## 2019-02-18 NOTE — CARE COORDINATION
Social Work/Discharge Planning:  Met with patient and her  Pino Scott and confirmed discharge plan of home. Provided patient and her  with home health care choice to review. Informed them of Trilogy approval.  Explained this worker will place order for a nebulizer, walker and oxygen.  faxed dme order to [address] [place] [address]. Will continue to follow.   Electronically signed by MARILU Owens on 2/18/2019 at 4:10 PM

## 2019-02-18 NOTE — PLAN OF CARE
Problem: Risk for Impaired Skin Integrity  Goal: Tissue integrity - skin and mucous membranes  Structural intactness and normal physiological function of skin and  mucous membranes.    Outcome: Met This Shift      Problem: Falls - Risk of:  Goal: Will remain free from falls  Will remain free from falls   Outcome: Met This Shift    Goal: Absence of physical injury  Absence of physical injury   Outcome: Met This Shift      Problem: Pain:  Goal: Pain level will decrease  Pain level will decrease   Outcome: Met This Shift    Goal: Control of acute pain  Control of acute pain   Outcome: Met This Shift    Goal: Control of chronic pain  Control of chronic pain   Outcome: Met This Shift

## 2019-02-18 NOTE — PROGRESS NOTES
Date: 2/17/2019    Time: 8:21 PM    Patient Placed On BIPAP/CPAP/ Non-Invasive Ventilation? No    If no must comment. Facial area red/color change? No           If YES are Blister/Lesion present? No   If yes must notify nursing staff  BIPAP/CPAP skin barrier? Yes    Skin barrier type:Liquicel       Comments: Patient refusing BIPAP. Machine in room if needed. Resting comfortably on 1L NC.         Josesito Cadena

## 2019-02-18 NOTE — CARE COORDINATION
DISCUSSED  D/C  PLANNING  WITH  PT  AT BEDSIDE;  PT  AWARE  SHE  IS  NEAR  D/C:  VERY  ADAMANT  SHE  IS  NOT  DISCHARGING TO A [facility];SHE  IS  GOING  HOME. REQUESTING  DME  EQUIPMENT ;  HOSPITAL BED;  BEDSIDE  TABLE;  WHEELED  WALKER. ALSO WE  WILL  CHECK  FOR HOME  02  NEEDS;AWAIT OUTCOME. D/W  SOCIAL  WORK;  WILL  FOLLOW. DR ACOSTA  INFORMED. Harpreet Mckenna,  RN,CM.

## 2019-02-18 NOTE — PROGRESS NOTES
[facility] Progress Note    Admitting Date and Time: 2/6/2019  3:25 AM  Admit Dx: Hypercapnic respiratory failure (HCC) [J96.92]  Hypercapnic respiratory failure (HCC) [J96.92]    Subjective:  Patient is being followed for Hypercapnic respiratory failure ([address] [J96.92]  Hypercapnic respiratory failure ([address] [J96.92]     Patient awake, alert, resting in bed in no acute distress  Reporting that she feels better and wants to go home  Denies sob at rest- on NC- 3 L  Denies dizziness/ light-headedness  Per PT note when patient was ambulating- documented that she was dizzy  Patient refusing to go to [facility]  Reporting she is ok with [address] at discharge    ROS: denies fever, chills, cp, sob, n/v, HA unless stated above.       [START ON 2/19/2019] predniSONE  30 mg Oral Daily    Followed by   Tommy Wolfe ON 2/22/2019] predniSONE  20 mg Oral Daily    Followed by   Tommy Wolfe ON 2/25/2019] predniSONE  10 mg Oral Daily    budesonide  250 mcg Nebulization BID    ipratropium-albuterol  1 ampule Inhalation 4x daily    formoterol  20 mcg Nebulization BID    sodium chloride flush  10 mL Intravenous BID    metoprolol tartrate  25 mg Oral BID    enoxaparin  40 mg Subcutaneous Daily       acetaminophen 650 mg Q4H PRN   LORazepam 1 mg Q4H PRN   medicated lip balm  PRN        Objective:    BP (!) 140/83   Pulse 103   Temp 98.9 °F (37.2 °C) (Oral)   Resp 22   Ht 5' (1.524 m)   Wt 132 lb 11.2 oz (60.2 kg)   SpO2 94%   BMI 25.92 kg/m²   General Appearance: alert and oriented to person, place and time and in no acute distress  Skin: warm and dry  Head: normocephalic and atraumatic  Neck: neck supple and non tender without mass   Pulmonary/Chest: Diminished throughout to auscultation  Cardiovascular: normal rate, normal S1 and S2 and no carotid bruits  Abdomen: soft, non-tender, non-distended, normal bowel sounds, no masses or organomegaly  Extremities: no cyanosis, no clubbing and no edema  Neurologic: speech normal PHYSICIAN NOTE 3/13/2019 1237AM:    Details of the evaluation - subjective assessment (including medication profile, past medical, family and social history when applicable), examination, review of lab and test data, diagnostic impressions and medical decision making - performed by DAPHNE Corley, were discussed with me on the date of service and I agree with clinical information herein unless otherwise noted. The patient has been evaluated by me personally on date of service. Pt reports no fevers, chills, n/v.     Exam: heart reg at rate of 100,lungs cta but diminished, abd pos bs soft nt, ext neg for le edema    I agree with the assessment and plan of DAPHNE Corley. Acute respiratory failure with hypercapnia   Bacterial pneumonia  Copd exacerbation  htn  deconditioning    Electronically signed by Sheldon Alegria D.O.   Hospitalist  [facility] Hospitalist Service at [place] [id number]

## 2019-02-18 NOTE — PROGRESS NOTES
Pulse ox was 94% on room air at rest.   Ambulated patient on room air. Oxygen saturation was 86% on room air while ambulating. Oxygen applied.    Recovery pulse ox was 94% on 3 liters of oxygen while ambulating

## 2019-02-18 NOTE — PROGRESS NOTES
acute respiratory failure. Initially intubated. Extubated 2/10/19 but required AVAPS post extubation    · Pneumonia  · COPD  · Acute on chronic hypercarbic and hypoxemic respiratory failure    Plan:  1. Completed  Rocephin. 2. Continue Nebs  3. Wean O2 as tolerated, down to 1L. Wean to keep spo2 90-95%, baseline is room air at home  4. Start PO steroid taper tomorrow had 40mg x 3 days, 30mg tomorrow  5. Continue  NIV-encourage patient to wear QHS and prn. Will see if qualify for HOME NIV (DME form already signed)  6. Incentive spirometry for pulmonary hygiene  7. OOB as able        Electronically signed by EDMUNDO Jesus on 2/18/2019 at 11:10 AM     Pulmonary attending:  Patient seen and examined with independent confirmation of findings. Reports she did not use NIV last night and wants to just go home with O2. She is in no acute distress but has very poor air entry and based on persistent hypercapnic respiratory failure is at very high risk of having recurrent hospitalizations due to COPD with worsening respiratory failure. She is agreeable to trying the Trilogy at home (NIV) but not clear if she will be compliant. Home O2 eval showed she needs 3L for ambulation although could be on 1 at rest.  Agree with steroid taper and home soon.      Jinny Robin MD, FACP, FCCP, FAASM

## 2019-02-18 NOTE — CARE COORDINATION
Social Work/Discharge Planning:  Catie Soni from [place] stated patient insurance approved Trilogy. Will continue to follow.   Electronically signed by MARILU Jimenez on 2/18/2019 at 11:32 AM

## 2019-02-18 NOTE — PROGRESS NOTES
Physical Therapy  Facility/Department: [address] INTERMEDIATE 1  Daily Treatment Note  NAME: Baron Davey  : 1950  MRN: [id number]    Date of Service: 2019    Patient Diagnosis(es): The primary encounter diagnosis was Acute hypercapnic respiratory failure ([facility] Diagnoses of [facility] exacerbation ([facility] Pneumonia due to organism, Lactic acidosis, Hypokalemia, and Sepsis, due to unspecified organism [facility]) were also pertinent to this visit. has a past medical history of Chronic back pain; COPD (chronic obstructive pulmonary disease) ([facility] Headache; Hypertension; and Osteoarthritis. has a past surgical history that includes Hysterectomy and  section. Evaluating Therapist: Dewitte Kayser PT     Room #:435  DIAGNOSIS: Hypercapnic respiratory failure  Additional Pertinent History:COPD, chronic back pain  PRECAUTIONS: falls, O2, alarm     Social:  Pt lives with  in a 2 floor plan, but bed and bath first floor 1 steps  to enter. Prior to admission independent without device                Initial Evaluation  Date:  Treatment      Short Term/ Long Term   Goals   Was pt agreeable to Eval/treatment? yes  with much encouragement     Does pt have pain?  Back pain  no c/o pain     Bed Mobility  Rolling: mod assist  Supine to sit: mod assist  Sit to supine: NT  Scooting: mod assist  supine to sit min assist  Scooting min assist Min assist   Transfers Sit to stand: mod assist  Stand to sit: mod assist  Stand pivot:NT   sit <> stand min assist with cues for hand placement CGA   Ambulation    15 feet with ww with min assist.  Pt fatigued with mobility  3 feet with ww min assist 100 feet with ww with CGA   Stair Negotiation  Ascended and descended  NT       AM-PAC Raw score               13/24  15/24         Patient education  Pt was educated on importance of mobillity    Patient response to education:   Pt verbalized understanding Pt demonstrated skill Pt requires further education in this area no   yes     Additional Comments: Pt self limiting with much encouragement to participate. States she was already up today but per nursing had not been out of bed yet. Pt took a few steps with ww then stated she could not go any farther, unable to state why. Pt did c/o dizziness when up. Pt was left in chair with call light left by patient. Chair/bed alarm: yes    Pt is making limited progress toward established Physical Therapy goals. Continue with physical therapy current plan of care.     Girma Holloway PT [id number]

## 2019-02-19 NOTE — PROGRESS NOTES
Occupational Therapy  OT BEDSIDE TREATMENT NOTE      Date:2019  Patient Name: Moraima Craig  MRN: [id number]  : 1950  Room: [address] [place] [address]     Evaluating OT: Kieran Huitron OTR/L [id number]     AM-PAC Daily Activity Raw Score: 15     Recommended Adaptive Equipment:  Shower chair      Diagnosis: hypercapnic respiratory failure. Pt presents to ED in respiratory distress.      Pertinent Medical History: chronic back pain, COPD, HTN, OA, opiate/nicotine dependence   Precautions:  Falls, O2     Home Living: Pt lives with  in a 2 story home with 1st floor B/B and 1 step on entry. Bathroom setup: walk in shower with shower chair      Prior Level of Function: Independent with ADLs, Independent with IADLs; completed functional mobility no AD but has [employer] if needed  Driving: Yes     Pain Level: chronic back pain     Cognition:  Alert and agreeable to therapy with max encouragement. Pt reports she ate breakfast sitting in the chair however therapist witnessed pt eating while laying in the bed. Unable to determine if pt is confused or fabricating truths. Functional Assessment:    Initial Eval Status  Date: 19 Treatment session:  Short Term Goals  Treatment frequency: 2-5x/wk PRN x1-3 wks   Feeding Set up       Grooming Min A  sandoval seated Set up   UB Dressing Min A   Set up   LB Dressing Max A  Donning B socks  mod A Min A    Bathing Mod A   SBA   Toileting Mod A  refused  SBA   Bed Mobility  Supine to sit: Mod A  SBA     Functional Transfers STS: Mod A  min A  SBA   Functional Mobility Min A with [employer]  Short in room distance  min A w/w SBA during ADLs   Balance Sitting: fair     Standing: fair minus at [employer]  sit balance good   Stand balance fair     Activity Tolerance Poor.  Pt becomes increasingly weak, fatigued and SOB with short in room mobility Marked fatigue with minimal activity  standing odalys x5-6 min with fair plus balance during self care tasks       Comments:   Self limiting during activity. Cues for safety. Quickly upset with therapist when encouraged to do more. Much encouragement for pt to sit up in the chair. Exercise: pt demonstrates functional use of UE's during activity    Education: Pt educated with need to increase activity. [place] Noun and transfer safety instructed. Other:  SOB with minimal activity. O2 saturation 93%. · Pt has made limited progress towards set goals.    · Continue with current plan of care      Total Tx Time: 435 [employer] Arjun YOSHI/L [id number]

## 2019-02-19 NOTE — PROGRESS NOTES
Physical Therapy  Facility/Department: [address] INTERMEDIATE 1  Daily Treatment Note  NAME: Yen Gunter  : 1950  MRN: [id number]    Date of Service: 2019    Patient Diagnosis(es): The primary encounter diagnosis was Acute hypercapnic respiratory failure ([facility] Diagnoses of COPD exacerbation ([facility] Pneumonia due to organism, Lactic acidosis, Hypokalemia, and Sepsis, due to unspecified organism [facility] were also pertinent to this visit. has a past medical history of Chronic back pain; COPD (chronic obstructive pulmonary disease) ([facility] Headache; Hypertension; and Osteoarthritis. has a past surgical history that includes Hysterectomy and  section.     Evaluating Therapist: Riki Moritz PT     Room #:435  DIAGNOSIS: Hypercapnic respiratory failure  Additional Pertinent History:COPD, chronic back pain  PRECAUTIONS: falls, O2, alarm     Social:  Pt lives with  in a 2 floor plan, but bed and bath first floor 1 steps  to enter. Prior to admission independent without device                       Initial Evaluation  Date:  Treatment      Short Term/ Long Term   Goals   Was pt agreeable to Eval/treatment? yes  with much encouragement     Does pt have pain?  Back pain  no c/o pain     Bed Mobility  Rolling: mod assist  Supine to sit: mod assist  Sit to supine: NT  Scooting: mod assist  supine to sit SBA Min assist   Transfers Sit to stand: mod assist  Stand to sit: mod assist  Stand pivot:NT   sit <> stand min assist with cues for hand placement CGA   Ambulation    15 feet with ww with min assist.  Pt fatigued with mobility  40 feet with ww min assist 100 feet with ww with CGA   Stair Negotiation  Ascended and descended  NT       AM-PAC Raw score                           Patient education  Pt was educated on importance of mobility    Patient response to education:   Pt verbalized understanding Pt demonstrated skill Pt requires further education in this area   no   yes Additional Comments: Pt with much encouragement to participate. Pt states she was already up in chair for breakfast, however had attempted to see pt earlier and pt was eating breakfast in bed. Pt self limiting. Pt was left in chair with call light left by patient. Pt is making  progress toward established Physical Therapy goals. Continue with physical therapy current plan of care.     Ninoska PT [id number]

## 2019-02-19 NOTE — CARE COORDINATION
Social Work/Discharge Planning:  Emma Cortez from [place] stated oxygen tank to be delivered today to patient room. Notified RN. Will continue to follow.   Electronically signed by MARILU Dennison on 2/19/2019 at 12:58 PM

## 2019-02-19 NOTE — PROGRESS NOTES
Pulmonary Progress Note    Admit Date: 2019                            PCP: Yumiko Le DO  78 yo female admitted 19 with COPd, CHF, and acute hypoxemic and hypercarbic respiratory failure likely precipitated by pneumonia + CHF, pleural effusions    Subjective:  Resting comfortably in bed on 1L NC. Denies cough or shortness of breath today. States she continues to get short of breath with ambulating. Refused NIV last night as it hurts her face. But plans to wear it at home and verbalized importance of wearing the NIV. Medications:       predniSONE  30 mg Oral Daily    Followed by   Carrie Singleton ON 2019] predniSONE  20 mg Oral Daily    Followed by   Carrie Singleton ON 2019] predniSONE  10 mg Oral Daily    budesonide  250 mcg Nebulization BID    ipratropium-albuterol  1 ampule Inhalation 4x daily    formoterol  20 mcg Nebulization BID    sodium chloride flush  10 mL Intravenous BID    metoprolol tartrate  25 mg Oral BID    enoxaparin  40 mg Subcutaneous Daily       Vitals:  VITALS:  /87   Pulse 80   Temp 98 °F (36.7 °C) (Axillary)   Resp 18   Ht 5' (1.524 m)   Wt 132 lb 8 oz (60.1 kg)   SpO2 94%   BMI 25.88 kg/m²   24HR INTAKE/OUTPUT:      Intake/Output Summary (Last 24 hours) at 19 1020  Last data filed at 19 1311   Gross per 24 hour   Intake              180 ml   Output                0 ml   Net              180 ml     CURRENT PULSE OXIMETRY:  SpO2: 94 %  24HR PULSE OXIMETRY RANGE:  SpO2  Av %  Min: 94 %  Max: 98 %      EXAM:  General: Alert, in NAd  ENT: Membranes moist, raspy voice  Neck: Supple, Trachea midline. Resp: No accessory muscle use. Non-labored, moderately diminished few fine crackles to bases, 1L   CV: Regular rate. Regular rhythm. No murmur  No edema. ABD: Non-tender. Non-distended. Soft round. Normal bowel sounds. Skin: Warm and dry. .  M/S: No cyanosis. No joint deformity. No clubbing. Neuro: Awake. Follows commands.  O x 3    I/O: I/O last 3 completed shifts: In: 480 [P.O.:480]  Out: -   No intake/output data recorded. Results:  CBC:   Recent Labs      19   0338   WBC  12.7*  15.1*   HGB  11.2*  11.0*   HCT  35.3  35.2   MCV  97.0  98.3   PLT  417  440     BMP:   Recent Labs      19   0338   NA  145  140   K  4.1  4.2   CL  105  102   CO2  31*  29   BUN  33*  35*   CREATININE  0.5  0.6     LFT: No results for input(s): ALKPHOS, ALT, AST, PROT, BILITOT, BILIDIR, LABALBU in the last 72 hours. PT/INR: No results for input(s): PROTIME, INR in the last 72 hours. Cultures:  Culture, Blood 2 2019  3:38 AM [facility]   Organism  (Abnormal) 2019  3:38 AM  - [facility]   Haemophilus influenzae     Culture, Blood 2 2019  3:38 AM  [facility]   Beta Lactamase POSITIVE      CULTURE, RESPIRATORY  (Abnormal) 2019  4:16 AM [facility]   Oral Pharyngeal Pamela reduced     Smear, Respiratory 2019  4:16 AM  - [facility] [address] [facility]   Group 5: >25 PMN's/LPF and <10 Epithelial cells/LPF   Abundant Polymorphonuclear leukocytes   Epithelial cells not seen   Rare Gram positive cocci   Abundant Gram negative pleomorphic rods     Organism  (Abnormal) 2019  4:16 AM [facility]   Haemophilus influenzae       Influenza A/B-Negative  Legionella-Negative    AB19  AVAPS  7.45, 43.6, 58.1, 31.4, 89%    Films:  Xr Chest Portable  19  Findings: Stable left lower lobe pleural effusion. Suggestion of mild   increased right pleural effusion from previous study. Redemonstration   of NG tube traversing below the left diaphragm. Redemonstration of right internal jugular central venous catheter with   tip projecting within the superior vena cava.       The heart is borderline enlarged. Bilateral lower lobe infiltrates. Represent fluid overload. Increased perivascular congestion somewhat similar from previous   study.    The aorta is tortuous and ectatic   2/13/19 2/11/19            Assessment:  78 yo with COPD, CHF, hypertension admitted with pneumonia and acute respiratory failure. Initially intubated. Extubated 2/10/19 but required AVAPS post extubation    · Pneumonia  · COPD  · Acute on chronic hypercarbic and hypoxemic respiratory failure    Plan:  1. Completed  Rocephin. 2. Continue Nebs  3. Wean O2 as tolerated, down to 1L. Wean to keep spo2 90-95%, baseline is room air at home  Completed home o2 eval yesterday, POX dropped to 86% on RA and was WNL 94% on 3L. Will need O2 at home for activity  4. On prednisone oral taper dose, will need to complete  5. Continue  NIV-encourage patient to wear QHS and prn. Trilogy approved and will need to be set up at home as well as O2 prior to dc. She is in no acute distress but has very poor air entry and based on persistent hypercapnic respiratory failure is at very high risk of having recurrent hospitalizations due to COPD with worsening respiratory failure  6. Incentive spirometry for pulmonary hygiene  7. OOB as able        Electronically signed by EDMUNDO Wilcox on 2/19/2019 at 10:20 AM     Patient seen and examined with independent confirmation of findings. She is short of breath on exertion (walking) but tolerating up with only min assist.  Only occ cough. Breath sounds are decreased and minimally coarse but without wheezing.   Lisha Martinez for home from pulmonary standpoint when you are otherwise ready on:  Alonso qid  Symbicort 2 puffs bid  O2 at 1 L nc  Trilogy at    Declining dose of prednisone  Follow up in our office in about 2 weeks

## 2019-02-19 NOTE — DISCHARGE SUMMARY
[facility] Physician Discharge Summary       Karol Stephen DO  [address] [facility] [address]  [phone]    Schedule an appointment as soon as possible for a visit in 1 week  Make an appointment in 1 week to go over hospitalization, medications, and follow up. Wilfredo Hatfield MD  [address] [place] [address] [phone]    Schedule an appointment as soon as possible for a visit in 2 weeks  Pulmonology     [address] [address] ( formerly [facility])  [address] , [address] [facility] [address] [address] [facility] [address] [place] [address], [address] [place] [address]. CALL APRIA PRIOR TO DISCHARGE FOR OXYGEN CONCENTRATOR TO BE DELIVERED TO HOME ADDRESS       Activity level: as tolerated     Diet: DIET GENERAL;  Dietary Nutrition Supplements: Frozen Oral Supplement        Dispo: home     Condition on discharge: stable      02 ordered at discharge 2- 3 L    Patient ID:  Gianni Menjivar  [id number]  77 y.o.  1950    Admit date: 2/6/2019    Discharge date and time:  2/19/2019  3:21 PM    Admission Diagnoses: Active Problems:    Acute hypercapnic respiratory failure (HCC)    Acute exacerbation of chronic obstructive pulmonary disease (COPD) ([address]    Palliative care by specialist    Dyspnea    Counseling regarding advanced care planning and goals of care    Protein-calorie malnutrition, severe ([address]  Resolved Problems:    * No resolved hospital problems. *      Discharge Diagnoses: Active Problems:    Acute hypercapnic respiratory failure (HCC)    Acute exacerbation of chronic obstructive pulmonary disease (COPD) ([address]    Palliative care by specialist    Dyspnea    Counseling regarding advanced care planning and goals of care    Protein-calorie malnutrition, severe ([address]  Resolved Problems:    * No resolved hospital problems. non-tender, non-distended, normal bowel sounds, no masses or organomegaly  Extremities: no cyanosis, no clubbing and no edema  Neurologic: speech normal        I/O last 3 completed shifts: In: 690 [P.O.:690]  Out: 250 [Urine:250]  I/O this shift:  In: -   Out: 150 [Urine:150]      LABS:  Recent Labs      19   0338   NA  145  140   K  4.1  4.2   CL  105  102   CO2  31*  29   BUN  33*  35*   CREATININE  0.5  0.6   GLUCOSE  94  112*   CALCIUM  7.7*  7.9*       Recent Labs      19   0338   WBC  12.7*  15.1*   RBC  3.64  3.58   HGB  11.2*  11.0*   HCT  35.3  35.2   MCV  97.0  98.3   MCH  30.8  30.7   MCHC  31.7*  31.3*   RDW  16.8*  16.9*   PLT  417  440   MPV  10.5  10.2       No results for input(s): POCGLU in the last 72 hours. Imaging:  Xr Chest Portable    Result Date: 2019  Patient MRN:  [id number] : 1950 Age: 77 years Gender: Female Order Date:  2019 7:00 AM EXAM: XR CHEST PORTABLE one image INDICATION:  check placement check placement COMPARISON: 2019 FINDINGS: Heart size is in upper limits of normal. Endotracheal tube is appropriately 4 cm above the zina. Nasogastric tube is unchanged. Right IJ central line is in place with the tip in the superior vena cava without pneumothorax. There is vascular congestion with perihilar and bibasilar infiltrates and pleural effusions. There is underlying COPD. COPD with superimposed mild CHF. Right IJ central line without complications. Xr Chest Portable    Result Date: 2019  Initial report created on 2019 6:16:03 AM EST EXAM:  XR Chest, 1 View EXAM DATE/TIME:  2019 3:45 AM CLINICAL HISTORY:  77years old, female; Device placement; Ett placement (vent status); Additional info: Chest pain. Ett and ng tube placement TECHNIQUE:  XR of the chest, 1 view.  COMPARISON:  DX CHEST 1 VIEW PORTABLE 2015 6:41 PM FINDINGS:  Tubes, catheters and devices:  Endotracheal tube is placed with its tip [address] [facility] [phone]  [address]    Phone:  [phone]   · budesonide-formoterol 160-4.5 MCG/ACT Aero  · ipratropium-albuterol 0.5-2.5 (3) MG/3ML Soln nebulizer solution  · metoprolol tartrate 25 MG tablet  · predniSONE 10 MG tablet  · predniSONE 10 MG tablet  · predniSONE 20 MG tablet     You can get these medications from any pharmacy    Bring a paper prescription for each of these medications  · FULL KIT NEBULIZER SET Misc           Note that more than 30 minutes was spent in preparing discharge papers, discussing discharge with patient, medication review, etc.    Signed:  Electronically signed by DAPHNE Avila on 2/19/2019 at 3:21 PM   HOSPITALIST ATTENDING PHYSICIAN NOTE 2/19/2019 1843PM:    Details of the evaluation - subjective assessment (including medication profile, past medical, family and social history when applicable), examination, review of lab and test data, diagnostic impressions and medical decision making - performed by DAPHNE Avila, were discussed with me on the date of service and I agree with clinical information herein unless otherwise noted. The patient has been evaluated by me personally prior to discharge. Pt reports no fevers, chills,n/v.     Exam: heart reg at rate of 84,lungs cta but diminished, abd pos bs soft nt, ext neg for le edema    I agree with the assessment and plan of DAPHNE Avila. Sepsis on admission  Acute respiratory failure with hypercapnia   Bacterial pneumonia  Copd exacerbation  htn  Deconditioning    Total time for discharge is 37 min      Electronically signed by Coco Rain D.O.   Hospitalist  [facility] at [facility]

## 2019-02-25 PROBLEM — J96.02 ACUTE HYPERCAPNIC RESPIRATORY FAILURE (HCC): Chronic | Status: ACTIVE | Noted: 2019-01-01

## 2019-04-11 NOTE — PROGRESS NOTES
Palliative medicine    Palliative medicine was consulted to discuss code status and establish goals of care. Patient remains a full code. Her goals for care to continue current treatment, improve her functioning overall and get home soon. No additional palliative needs are identified. Palliative medicine will sign off. Please reconsult us if any new needs arise.     Kaushal Goncalves PA-C 11-Apr-2019 15:30

## 2022-04-07 NOTE — PLAN OF CARE
Problem: Risk for Impaired Skin Integrity  Goal: Tissue integrity - skin and mucous membranes  Structural intactness and normal physiological function of skin and  mucous membranes.    Outcome: Met This Shift      Problem: Falls - Risk of:  Goal: Will remain free from falls  Will remain free from falls   Outcome: Met This Shift    Goal: Absence of physical injury  Absence of physical injury   Outcome: Met This Shift      Problem: Restraint Use - Nonviolent/Non-Self-Destructive Behavior:  Goal: Absence of restraint-related injury  Absence of restraint-related injury   Outcome: Met This Shift Pt called for in waiting room with no response     Perico Yadav RN  04/07/22 4915

## 2023-04-06 NOTE — PROGRESS NOTES
Date: 2/12/2019    Time: 1:01 AM    Patient Placed On BIPAP/CPAP/ Non-Invasive Ventilation? Yes    If no must comment. Facial area red/color change? No           If YES are Blister/Lesion present? No   If yes must notify nursing staff  BIPAP/CPAP skin barrier?   Yes    Skin barrier type:Liquicel       Comments:        Swati Hsieh Problem: ABCDS Injury Assessment  Goal: Absence of physical injury  4/6/2023 1143 by Mitchel Lockett RN  Outcome: Adequate for Discharge     Problem: Skin/Tissue Integrity  Goal: Absence of new skin breakdown  Description: 1. Monitor for areas of redness and/or skin breakdown  2. Assess vascular access sites hourly  3. Every 4-6 hours minimum:  Change oxygen saturation probe site  4. Every 4-6 hours:  If on nasal continuous positive airway pressure, respiratory therapy assess nares and determine need for appliance change or resting period.   4/6/2023 1143 by Mitchle Lockett RN  Outcome: Adequate for Discharge     Problem: Cardiovascular - Adult  Goal: Maintains optimal cardiac output and hemodynamic stability  4/6/2023 1143 by Mitchel Lockett RN  Outcome: Adequate for Discharge     Problem: Metabolic/Fluid and Electrolytes - Adult  Goal: Hemodynamic stability and optimal renal function maintained  4/6/2023 1143 by Mitchel Lockett RN  Outcome: Adequate for Discharge     Problem: Pain  Goal: Verbalizes/displays adequate comfort level or baseline comfort level  4/6/2023 1143 by Mitchel Lockett RN  Outcome: Adequate for Discharge

## 2024-12-13 NOTE — PLAN OF CARE
Bedside report and transfer of care given to VISHAL French. Pt currently resting in bed with the call light within reach. Pt denies any other care needs at this time. Pt stable at this time.     Problem: Risk for Impaired Skin Integrity  Goal: Tissue integrity - skin and mucous membranes  Structural intactness and normal physiological function of skin and  mucous membranes.    Outcome: Met This Shift      Problem: Falls - Risk of:  Goal: Will remain free from falls  Will remain free from falls   Outcome: Met This Shift    Goal: Absence of physical injury  Absence of physical injury   Outcome: Met This Shift      Problem: Nutrition  Goal: Optimal nutrition therapy  Outcome: Met This Shift      Problem: Pain:  Goal: Pain level will decrease  Pain level will decrease   Outcome: Met This Shift    Goal: Control of acute pain  Control of acute pain   Outcome: Met This Shift    Goal: Control of chronic pain  Control of chronic pain   Outcome: Met This Shift